# Patient Record
Sex: FEMALE | Race: WHITE | NOT HISPANIC OR LATINO | Employment: OTHER | URBAN - NONMETROPOLITAN AREA
[De-identification: names, ages, dates, MRNs, and addresses within clinical notes are randomized per-mention and may not be internally consistent; named-entity substitution may affect disease eponyms.]

---

## 2017-01-18 ENCOUNTER — HOSPITAL ENCOUNTER (OUTPATIENT)
Dept: GENERAL RADIOLOGY | Facility: HOSPITAL | Age: 57
Discharge: HOME | End: 2017-01-18
Attending: ORTHOPAEDIC SURGERY | Admitting: ORTHOPAEDIC SURGERY

## 2017-01-18 ENCOUNTER — OFFICE VISIT (OUTPATIENT)
Dept: ORTHOPEDIC SURGERY | Facility: CLINIC | Age: 57
End: 2017-01-18

## 2017-01-18 VITALS — HEIGHT: 67 IN | WEIGHT: 230 LBS | BODY MASS INDEX: 36.1 KG/M2

## 2017-01-18 DIAGNOSIS — M25.561 PAIN IN BOTH KNEES, UNSPECIFIED CHRONICITY: Primary | ICD-10-CM

## 2017-01-18 DIAGNOSIS — M25.562 PAIN IN BOTH KNEES, UNSPECIFIED CHRONICITY: Primary | ICD-10-CM

## 2017-01-18 PROCEDURE — 99213 OFFICE O/P EST LOW 20 MIN: CPT | Performed by: ORTHOPAEDIC SURGERY

## 2017-01-18 PROCEDURE — 20610 DRAIN/INJ JOINT/BURSA W/O US: CPT | Performed by: ORTHOPAEDIC SURGERY

## 2017-01-18 PROCEDURE — 73560 X-RAY EXAM OF KNEE 1 OR 2: CPT | Performed by: RADIOLOGY

## 2017-01-18 PROCEDURE — 73560 X-RAY EXAM OF KNEE 1 OR 2: CPT

## 2017-01-18 RX ORDER — LIDOCAINE HYDROCHLORIDE 20 MG/ML
5 INJECTION, SOLUTION INFILTRATION; PERINEURAL ONCE
Status: COMPLETED | OUTPATIENT
Start: 2017-01-18 | End: 2017-01-18

## 2017-01-18 RX ORDER — NORTRIPTYLINE HYDROCHLORIDE 25 MG/1
CAPSULE ORAL
Refills: 2 | COMMUNITY
Start: 2017-01-13 | End: 2017-02-01 | Stop reason: ALTCHOICE

## 2017-01-18 RX ORDER — BUTORPHANOL TARTRATE 10 MG/ML
1 SPRAY, METERED NASAL AS NEEDED
Refills: 2 | COMMUNITY
Start: 2017-01-13

## 2017-01-18 RX ORDER — BENZONATATE 200 MG/1
CAPSULE ORAL
Refills: 0 | COMMUNITY
Start: 2017-01-04 | End: 2017-02-09

## 2017-01-18 RX ORDER — METHYLPREDNISOLONE ACETATE 40 MG/ML
40 INJECTION, SUSPENSION INTRA-ARTICULAR; INTRALESIONAL; INTRAMUSCULAR; SOFT TISSUE ONCE
Status: COMPLETED | OUTPATIENT
Start: 2017-01-18 | End: 2017-01-18

## 2017-01-18 RX ORDER — VERAPAMIL HYDROCHLORIDE 180 MG/1
180 CAPSULE, EXTENDED RELEASE ORAL DAILY
Refills: 2 | COMMUNITY
Start: 2017-01-13

## 2017-01-18 RX ORDER — PROMETHAZINE HYDROCHLORIDE 25 MG/1
TABLET ORAL
Refills: 0 | COMMUNITY
Start: 2016-12-16 | End: 2017-02-09

## 2017-01-18 RX ORDER — DIHYDROERGOTAMINE MESYLATE 4 MG/ML
1 SPRAY NASAL AS NEEDED
Refills: 2 | COMMUNITY
Start: 2016-12-14 | End: 2022-08-31

## 2017-01-18 RX ADMIN — LIDOCAINE HYDROCHLORIDE 5 ML: 20 INJECTION, SOLUTION INFILTRATION; PERINEURAL at 10:47

## 2017-01-18 RX ADMIN — METHYLPREDNISOLONE ACETATE 40 MG: 40 INJECTION, SUSPENSION INTRA-ARTICULAR; INTRALESIONAL; INTRAMUSCULAR; SOFT TISSUE at 10:48

## 2017-01-18 NOTE — MR AVS SNAPSHOT
Roseline Steiner   1/18/2017 9:20 AM   Office Visit    Dept Phone:  [phone]   Encounter #:  [id number]    Provider:  Wilfred Randle MD   Department:  [facility]                Your Full Care Plan              Your Updated Medication List          This list is accurate as of: 1/18/17 10:32 AM.  Always use your most recent med list.                benzonatate 200 MG capsule   Commonly known as:  TESSALON       butorphanol 10 MG/ML nasal spray   Commonly known as:  STADOL       dihydroergotamine 4 MG/ML nasal spray   Commonly known as:  MIGRANAL       FLUoxetine 20 MG capsule   Commonly known as:  PROzac       gabapentin 300 MG capsule   Commonly known as:  NEURONTIN       levothyroxine 100 MCG tablet   Commonly known as:  SYNTHROID, LEVOTHROID       * nortriptyline 10 MG capsule   Commonly known as:  PAMELOR       * nortriptyline 25 MG capsule   Commonly known as:  PAMELOR       promethazine 25 MG tablet   Commonly known as:  PHENERGAN       SUMAtriptan 100 MG tablet   Commonly known as:  IMITREX       verapamil  MG 24 hr capsule   Commonly known as:  VERELAN PM       * Notice:  This list has 2 medication(s) that are the same as other medications prescribed for you. Read the directions carefully, and ask your doctor or other care provider to review them with you.            We Performed the Following     XR Knee 1 or 2 View Bilateral       You Were Diagnosed With        Codes Comments    Pain in both knees, unspecified chronicity    -  Primary ICD-10-CM: M25.561, M25.562  ICD-9-CM: 719.46       Instructions     None    Patient Instructions History      Upcoming Appointments     Visit Type Date Time Department    OFFICE VISIT 1/18/2017  9:20 AM [facility]    XR COR KNEE 1 OR 2 VW BILAT 1/18/2017  9:45 AM [facility]      MyChart Signup     [facility] allows you to send messages to your doctor, view your test results, renew  "your prescriptions, schedule appointments, and more. To sign up, go to [email or web address] and click on the Sign Up Now link in the New User? box. Enter your [employer] Activation Code exactly as it appears below along with the last four digits of your Social Security Number and your Date of Birth () to complete the sign-up process. If you do not sign up before the expiration date, you must request a new code.    [employer] Activation Code: [email or web address]  Expires: 2017 10:22 AM    If you have questions, you can email [email or web address] or call [phone] to talk to our [employer] staff. Remember, [employer] is NOT to be used for urgent needs. For medical emergencies, dial 911.               Other Info from Your Visit           Allergies     Codeine        Reason for Visit     Right Knee - Pain     Left Knee - Pain     Knee Pain patient presents today for bilateral knee pain, right worse than left      Vital Signs     Height Weight Body Mass Index Smoking Status          67\" (170.2 cm) 230 lb (104 kg) 36.02 kg/m2 Never Smoker        Problems and Diagnoses Noted     Bilateral knee pain    -  Primary      Results         "

## 2017-01-18 NOTE — PROGRESS NOTES
Roseline Steiner   :1960    Date of encounter:2017        HPI:  Roseline Steiner is a 56 y.o. female who presents here today with complaints of bilateral knee pain.  She is status post total knee arthroplasty of the left knee.  She states the left knee is doing reasonably well however she's had increased pain in the right knee.  She states that in October she fell and landed directly on the knee.  Since that fall her right knee has been giving out on her and causing her to fall.  She is complaining of significant right knee pain with instability.  She states it's worse with prolonged walking.  She denies paresthesias.    Exam:  Examination of the right knee reveals mild effusion.  She has mild medial joint line tenderness.  She is full range of motion.  No instability with varus or valgus stressing.  Neurovascular status is intact.    Radiographs:  AP and lateral views of the bilateral knees were reviewed.  The right knee does reveal osteoarthritic changes with slight worsening compared to x-rays obtained for years ago.  The left knee reveals total knee arthroplasty with prosthesis in good alignment and position, there is no evidence of loosening of the prosthesis.    Knee Aspiration/Injection Procedure Note    Right knee aspiration/injection was discussed with the patient in detail, including indication, risks, benefits, and alternatives. Verbal consent was given for the procedure.    Aspiration/injection site at the superior lateral capsule recess was identified by physical examination then cleaned with Betadine and alcohol swabs.  Sterile technique was used. Local anesthesia was obtained with approximately 5 mL of 2% lidocaine without epinephrine.      Approximately5 mL of clear yellow was aspirated.   The needle was left in place and syringe was changed for injection. Injectate was passed into the joint space without difficulty. The needle was removed and a sterile dressing was applied. The procedure was  tolerated well without difficulty.    Injection mixture:  2% lidocaine without epinephrine: 2 mL  40 mg/mL methylprednisolone acetate: 1 mL      Assessment    ICD-10-CM ICD-9-CM   1. Pain in both knees, unspecified chronicity M25.561 719.46    M25.562        Plan:  A 56-year-old female with complaints of right knee pain following a fall that occurred in October.  Radiographs do reveal moderate osteoarthritic changes with slight worsening compared to x-rays taken 3 years ago.  With her most recent fall she has likely aggravated her arthritis.  For diagnostic maneuver today we proceeded with a Depo-Medrol injection intra-articular into the knee.  She'll return back if no improvement or if there is any worsening of symptoms.    Written by, Camila CABELLO, acting as a scribe for Dr. Jer MCKEON, Wilfred Randle MD, personally performed the services described in this documentation as scribed by the above named individual in my presence, and it is both accurate and complete.  1/18/2017  11:38 AM    Cc Dr. Beavers

## 2017-02-01 ENCOUNTER — OFFICE VISIT (OUTPATIENT)
Dept: ORTHOPEDIC SURGERY | Facility: CLINIC | Age: 57
End: 2017-02-01

## 2017-02-01 VITALS
BODY MASS INDEX: 36.1 KG/M2 | SYSTOLIC BLOOD PRESSURE: 118 MMHG | HEART RATE: 98 BPM | HEIGHT: 67 IN | WEIGHT: 230 LBS | DIASTOLIC BLOOD PRESSURE: 73 MMHG

## 2017-02-01 DIAGNOSIS — M17.11 PRIMARY OSTEOARTHRITIS OF RIGHT KNEE: Primary | ICD-10-CM

## 2017-02-01 DIAGNOSIS — Z01.818 PREOP TESTING: ICD-10-CM

## 2017-02-01 PROCEDURE — 99214 OFFICE O/P EST MOD 30 MIN: CPT | Performed by: ORTHOPAEDIC SURGERY

## 2017-02-01 RX ORDER — SODIUM CHLORIDE 0.9 % (FLUSH) 0.9 %
1-10 SYRINGE (ML) INJECTION AS NEEDED
Status: CANCELLED | OUTPATIENT
Start: 2017-02-01

## 2017-02-01 RX ORDER — SODIUM CHLORIDE, SODIUM LACTATE, POTASSIUM CHLORIDE, CALCIUM CHLORIDE 600; 310; 30; 20 MG/100ML; MG/100ML; MG/100ML; MG/100ML
75 INJECTION, SOLUTION INTRAVENOUS CONTINUOUS
Status: CANCELLED | OUTPATIENT
Start: 2017-02-01

## 2017-02-01 RX ORDER — DICLOFENAC SODIUM 75 MG/1
75 TABLET, DELAYED RELEASE ORAL 2 TIMES DAILY
Refills: 1 | COMMUNITY
Start: 2017-01-20 | End: 2017-02-18 | Stop reason: HOSPADM

## 2017-02-01 RX ORDER — NORTRIPTYLINE HYDROCHLORIDE 50 MG/1
50 CAPSULE ORAL NIGHTLY
COMMUNITY
End: 2018-06-15 | Stop reason: ALTCHOICE

## 2017-02-01 RX ORDER — ACETAMINOPHEN, ASPIRIN AND CAFFEINE 250; 250; 65 MG/1; MG/1; MG/1
1 TABLET, FILM COATED ORAL EVERY 6 HOURS PRN
COMMUNITY
End: 2017-02-09

## 2017-02-01 NOTE — PROGRESS NOTES
History and Physical    Patient: Roseline Steiner  YOB: 1960  Date of encounter: 02/01/2017      History of Present Illness:   Roseline Steiner is a 56 y.o. female who presents here today with complaints of bilateral knee pain. She is status post total knee arthroplasty of the left knee. She states the left knee is doing reasonably well however she's had increased pain in the right knee. She states that in October she fell and landed directly on the knee. Since that fall her right knee has been giving out on her and causing her to fall. She is complaining of significant right knee pain with instability. She states it's worse with prolonged walking.  She's previously had Synvisc injections with no improvement.  She is most recently had a cortisone injection without any significant improvement in her knee pain.  She denies paresthesias.    PMH:   Patient Active Problem List   Diagnosis   • Migraine with status migrainosus   • Arthralgia of left hip   • Greater trochanteric bursitis   • Primary osteoarthritis of right knee     Past Medical History   Diagnosis Date   • Disease of thyroid gland    • Fibromyalgia    • Fibromyalgia    • GERD (gastroesophageal reflux disease)    • Insomnia    • Migraine    • Nephrolithiasis          PSH:  Past Surgical History   Procedure Laterality Date   • Hysterectomy     • Cholecystectomy     • Gastric banding     • Knee surgery     • Hand surgery     • Carpal tunnel release Bilateral    • Cranial neurostimulator insertion/replacement  01/09/2017     for migraines       Allergies:     Allergies   Allergen Reactions   • Codeine        Medications:     Current Outpatient Prescriptions:   •  aspirin-acetaminophen-caffeine (EXCEDRIN MIGRAINE) 250-250-65 MG per tablet, Take 1 tablet by mouth Every 6 (Six) Hours As Needed for headaches., Disp: , Rfl:   •  nortriptyline (PAMELOR) 50 MG capsule, Take 50 mg by mouth Every Night., Disp: , Rfl:   •  benzonatate (TESSALON) 200 MG capsule,  , Disp: , Rfl: 0  •  butorphanol (STADOL) 10 MG/ML nasal spray, , Disp: , Rfl: 2  •  diclofenac (VOLTAREN) 75 MG EC tablet, , Disp: , Rfl: 1  •  dihydroergotamine (MIGRANAL) 4 MG/ML nasal spray, , Disp: , Rfl: 2  •  FLUoxetine (PROzac) 20 MG capsule, Take 20 mg by mouth 3 (three) times a day., Disp: , Rfl:   •  gabapentin (NEURONTIN) 300 MG capsule, Take 300 mg by mouth 2 (two) times a day., Disp: , Rfl:   •  levothyroxine (SYNTHROID, LEVOTHROID) 100 MCG tablet, Take 100 mcg by mouth daily., Disp: , Rfl:   •  promethazine (PHENERGAN) 25 MG tablet, , Disp: , Rfl: 0  •  SUMAtriptan (IMITREX) 100 MG tablet, Take 100 mg by mouth every 2 (two) hours as needed for migraine., Disp: , Rfl:   •  verapamil PM (VERELAN PM) 180 MG 24 hr capsule, , Disp: , Rfl: 2    Social History:     Social History     Occupational History   • Not on file.     Social History Main Topics   • Smoking status: Never Smoker   • Smokeless tobacco: Not on file   • Alcohol use No   • Drug use: No   • Sexual activity: Not on file      Social History     Social History Narrative       Family History:     Family History   Problem Relation Age of Onset   • Cancer Father    • Hypertension Father    • Hyperlipidemia Mother        Review of Systems:   Pertinent positives are mentioned in the HPI. All other systems were reviewed and found to be negative.  Review of Systems   Constitutional: Negative.    HENT: Negative.    Eyes: Negative.    Respiratory: Negative.    Cardiovascular: Negative.    Gastrointestinal: Negative.    Endocrine: Negative.    Genitourinary: Negative.    Musculoskeletal:        Pertinent positives listed in HPI.     Skin: Negative.    Allergic/Immunologic: Negative.    Neurological: Negative.    Hematological: Negative.    Psychiatric/Behavioral: Negative.          Physical Exam:   General Appearance:    56 y.o. female  cooperative, in no acute distress.  Alert and oriented x 3,                   Vitals:    02/01/17 0943   BP: 118/73  "  BP Location: Right arm   Patient Position: Sitting   Pulse: 98   Weight: 230 lb (104 kg)   Height: 67\" (170.2 cm)          HEENT: Unremarkable      Neck: Supple without lymphadenopathy.      Chest: Clear to ascultation bilaterally. Normal respiratory effort.      Heart: Regular rate and rhythm. No murmurs noted.      Skin:  Warm and dry without any rash.      Musculoskeletal:  Upper Extremities: Unremarkable.   Lower Extremities: Examination of the right knee reveals mild effusion. She has mild medial joint line tenderness. She is full range of motion. No instability with varus or valgus stressing. Neurovascular status is intact.      Radiology:     AP and lateral views of the bilateral knees were reviewed. The right knee does reveal osteoarthritic changes with slight worsening compared to x-rays obtained for years ago. The left knee reveals total knee arthroplasty with prosthesis in good alignment and position, there is no evidence of loosening of the prosthesis    Assessment    ICD-10-CM ICD-9-CM   1. Primary osteoarthritis of right knee M17.11 715.16       Plan:  A 56-year-old female with primary osteoarthritis of the right knee.  She is no longer responding to conservative treatment including Synvisc injections, cortisone injections, and anti-inflammatories.  Her pain is getting progressively worse and now causing frequent falls.  Given this we have discussed proceeding with a total knee arthroplasty.  We discussed the risks, benefits, and future outcomes of surgery.  She accepts these risks and is agreeable to surgery.  She is a 28 been cleared through her primary care practitioner.  We will tentatively place her on the schedule for February 21, 2017 [facility].    Written by, Camila CABELLO, acting as a scribe for Dr. Jer Beavers I, Wilfred Randle MD, personally performed the services described in this documentation as scribed by the above named individual in my " presence, and it is both accurate and complete.  2/1/2017  7:28 PM    This document was signed by Wilfred Randle M.D.  February 1, 2017 7:28 PM

## 2017-02-02 NOTE — H&P
History and Physical     Patient: Roseline Steiner  YOB: 1960  Date of encounter: 02/01/2017        History of Present Illness:   Roseline Steiner is a 56 y.o. female who presents here today with complaints of bilateral knee pain. She is status post total knee arthroplasty of the left knee. She states the left knee is doing reasonably well however she's had increased pain in the right knee. She states that in October she fell and landed directly on the knee. Since that fall her right knee has been giving out on her and causing her to fall. She is complaining of significant right knee pain with instability. She states it's worse with prolonged walking.  She's previously had Synvisc injections with no improvement. She is most recently had a cortisone injection without any significant improvement in her knee pain. She denies paresthesias.     PMH:       Patient Active Problem List   Diagnosis   • Migraine with status migrainosus   • Arthralgia of left hip   • Greater trochanteric bursitis   • Primary osteoarthritis of right knee       Medical History         Past Medical History   Diagnosis Date   • Disease of thyroid gland     • Fibromyalgia     • Fibromyalgia     • GERD (gastroesophageal reflux disease)     • Insomnia     • Migraine     • Nephrolithiasis                 PSH:   Surgical History    Past Surgical History   Procedure Laterality Date   • Hysterectomy       • Cholecystectomy       • Gastric banding       • Knee surgery       • Hand surgery       • Carpal tunnel release Bilateral     • Cranial neurostimulator insertion/replacement   01/09/2017       for migraines            Allergies:           Allergies   Allergen Reactions   • Codeine           Medications:      Current Outpatient Prescriptions:   • aspirin-acetaminophen-caffeine (EXCEDRIN MIGRAINE) 250-250-65 MG per tablet, Take 1 tablet by mouth Every 6 (Six) Hours As Needed for headaches., Disp: , Rfl:   • nortriptyline (PAMELOR) 50 MG  capsule, Take 50 mg by mouth Every Night., Disp: , Rfl:   • benzonatate (TESSALON) 200 MG capsule, , Disp: , Rfl: 0  • butorphanol (STADOL) 10 MG/ML nasal spray, , Disp: , Rfl: 2  • diclofenac (VOLTAREN) 75 MG EC tablet, , Disp: , Rfl: 1  • dihydroergotamine (MIGRANAL) 4 MG/ML nasal spray, , Disp: , Rfl: 2  • FLUoxetine (PROzac) 20 MG capsule, Take 20 mg by mouth 3 (three) times a day., Disp: , Rfl:   • gabapentin (NEURONTIN) 300 MG capsule, Take 300 mg by mouth 2 (two) times a day., Disp: , Rfl:   • levothyroxine (SYNTHROID, LEVOTHROID) 100 MCG tablet, Take 100 mcg by mouth daily., Disp: , Rfl:   • promethazine (PHENERGAN) 25 MG tablet, , Disp: , Rfl: 0  • SUMAtriptan (IMITREX) 100 MG tablet, Take 100 mg by mouth every 2 (two) hours as needed for migraine., Disp: , Rfl:   • verapamil PM (VERELAN PM) 180 MG 24 hr capsule, , Disp: , Rfl: 2     Social History:      Social History          Occupational History   • Not on file.           Social History Main Topics   • Smoking status: Never Smoker   • Smokeless tobacco: Not on file   • Alcohol use No   • Drug use: No   • Sexual activity: Not on file      Social History      Social History Narrative         Family History:            Family History   Problem Relation Age of Onset   • Cancer Father     • Hypertension Father     • Hyperlipidemia Mother           Review of Systems:   Pertinent positives are mentioned in the HPI. All other systems were reviewed and found to be negative.  Review of Systems   Constitutional: Negative.   HENT: Negative.   Eyes: Negative.   Respiratory: Negative.   Cardiovascular: Negative.   Gastrointestinal: Negative.   Endocrine: Negative.   Genitourinary: Negative.   Musculoskeletal:   Pertinent positives listed in HPI.  Skin: Negative.   Allergic/Immunologic: Negative.   Neurological: Negative.   Hematological: Negative.   Psychiatric/Behavioral: Negative.            Physical Exam:   General Appearance:  56 y.o. female cooperative, in no  "acute distress. Alert and oriented x 3,        Vitals        Vitals:     02/01/17 0943   BP: 118/73   BP Location: Right arm   Patient Position: Sitting   Pulse: 98   Weight: 230 lb (104 kg)   Height: 67\" (170.2 cm)          HEENT: Unremarkable  Neck: Supple without lymphadenopathy.  Chest: Clear to ascultation bilaterally. Normal respiratory effort.  Heart: Regular rate and rhythm. No murmurs noted.  Skin:  Warm and dry without any rash.  Musculoskeletal:  Upper Extremities: Unremarkable.   Lower Extremities: Examination of the right knee reveals mild effusion. She has mild medial joint line tenderness. She is full range of motion. No instability with varus or valgus stressing. Neurovascular status is intact.        Radiology:    AP and lateral views of the bilateral knees were reviewed. The right knee does reveal osteoarthritic changes with slight worsening compared to x-rays obtained for years ago. The left knee reveals total knee arthroplasty with prosthesis in good alignment and position, there is no evidence of loosening of the prosthesis     Assessment      ICD-10-CM ICD-9-CM   1. Primary osteoarthritis of right knee M17.11 715.16         Plan:  A 56-year-old female with primary osteoarthritis of the right knee. She is no longer responding to conservative treatment including Synvisc injections, cortisone injections, and anti-inflammatories. Her pain is getting progressively worse and now causing frequent falls. Given this we have discussed proceeding with a total knee arthroplasty. We discussed the risks, benefits, and future outcomes of surgery. She accepts these risks and is agreeable to surgery. She is a 28 been cleared through her primary care practitioner. We will tentatively place her on the schedule for February 21, 2017 at [facility].     Written by, Camila CABELLO, acting as a scribe for Dr. Jer Beavers I, Wilfred Randle MD, personally performed the services " described in this documentation as scribed by the above named individual in my presence, and it is both accurate and complete. 2/1/2017 7:28 PM     This document was signed by Wilfred Randle M.D.  February 1, 2017 7:28 PM

## 2017-02-07 ENCOUNTER — TELEPHONE (OUTPATIENT)
Dept: ORTHOPEDIC SURGERY | Facility: CLINIC | Age: 57
End: 2017-02-07

## 2017-02-09 ENCOUNTER — APPOINTMENT (OUTPATIENT)
Dept: PREADMISSION TESTING | Facility: HOSPITAL | Age: 57
End: 2017-02-09

## 2017-02-09 LAB
ANION GAP SERPL CALCULATED.3IONS-SCNC: 3.9 MMOL/L (ref 3.6–11.2)
BASOPHILS # BLD AUTO: 0.04 10*3/MM3 (ref 0–0.3)
BASOPHILS NFR BLD AUTO: 0.7 % (ref 0–2)
BUN BLD-MCNC: 14 MG/DL (ref 7–21)
BUN/CREAT SERPL: 16.1 (ref 7–25)
CALCIUM SPEC-SCNC: 9.4 MG/DL (ref 7.7–10)
CHLORIDE SERPL-SCNC: 106 MMOL/L (ref 99–112)
CO2 SERPL-SCNC: 32.1 MMOL/L (ref 24.3–31.9)
CREAT BLD-MCNC: 0.87 MG/DL (ref 0.43–1.29)
DEPRECATED RDW RBC AUTO: 49 FL (ref 37–54)
EOSINOPHIL # BLD AUTO: 0.17 10*3/MM3 (ref 0–0.7)
EOSINOPHIL NFR BLD AUTO: 3 % (ref 0–5)
ERYTHROCYTE [DISTWIDTH] IN BLOOD BY AUTOMATED COUNT: 14.4 % (ref 11.5–14.5)
GFR SERPL CREATININE-BSD FRML MDRD: 67 ML/MIN/1.73
GLUCOSE BLD-MCNC: 81 MG/DL (ref 70–110)
HCT VFR BLD AUTO: 39.8 % (ref 37–47)
HGB BLD-MCNC: 12.2 G/DL (ref 12–16)
IMM GRANULOCYTES # BLD: 0.02 10*3/MM3 (ref 0–0.03)
IMM GRANULOCYTES NFR BLD: 0.4 % (ref 0–0.5)
LYMPHOCYTES # BLD AUTO: 1.98 10*3/MM3 (ref 1–3)
LYMPHOCYTES NFR BLD AUTO: 35 % (ref 21–51)
MCH RBC QN AUTO: 29.7 PG (ref 27–33)
MCHC RBC AUTO-ENTMCNC: 30.7 G/DL (ref 33–37)
MCV RBC AUTO: 96.8 FL (ref 80–94)
MONOCYTES # BLD AUTO: 0.47 10*3/MM3 (ref 0.1–0.9)
MONOCYTES NFR BLD AUTO: 8.3 % (ref 0–10)
MRSA DNA SPEC QL NAA+PROBE: NEGATIVE
NEUTROPHILS # BLD AUTO: 2.98 10*3/MM3 (ref 1.4–6.5)
NEUTROPHILS NFR BLD AUTO: 52.6 % (ref 30–70)
OSMOLALITY SERPL CALC.SUM OF ELEC: 282.6 MOSM/KG (ref 273–305)
PLATELET # BLD AUTO: 239 10*3/MM3 (ref 130–400)
PMV BLD AUTO: 9.9 FL (ref 6–10)
POTASSIUM BLD-SCNC: 3.8 MMOL/L (ref 3.5–5.3)
RBC # BLD AUTO: 4.11 10*6/MM3 (ref 4.2–5.4)
S AUREUS DNA SPEC QL NAA+PROBE: POSITIVE
SODIUM BLD-SCNC: 142 MMOL/L (ref 135–153)
WBC NRBC COR # BLD: 5.66 10*3/MM3 (ref 4.5–12.5)

## 2017-02-09 PROCEDURE — 36415 COLL VENOUS BLD VENIPUNCTURE: CPT

## 2017-02-09 PROCEDURE — 80048 BASIC METABOLIC PNL TOTAL CA: CPT | Performed by: ORTHOPAEDIC SURGERY

## 2017-02-09 PROCEDURE — 85025 COMPLETE CBC W/AUTO DIFF WBC: CPT | Performed by: ORTHOPAEDIC SURGERY

## 2017-02-09 PROCEDURE — 87640 STAPH A DNA AMP PROBE: CPT | Performed by: ORTHOPAEDIC SURGERY

## 2017-02-09 PROCEDURE — 87641 MR-STAPH DNA AMP PROBE: CPT | Performed by: ORTHOPAEDIC SURGERY

## 2017-02-09 NOTE — DISCHARGE INSTRUCTIONS
TAKE the following medications the morning of surgery:  All heart or blood pressure medications    HOLD all diabetic medications the morning of surgery as order by physician.    Arrival time for surgery on 2/14/2017 is 0600 am, please follow instructions on use of prep cloths given by nurse.  General Instructions:  • Do NOT eat or drink after midnight 2/13/2017 which includes water, mints, or gum.  • You may brush your teeth.  • Do NOT smoke, chew tobacco, or drink alcohol within 24 hours prior to surgery.  • Bring medications in original bottles, any inhalers and if applicable your C-PAP/BI-PAP machine  • Bring any papers given to you in the doctor’s office  • Wear clean, comfortable clothes and socks  • Do NOT wear contact lenses or make-up or dark nail polish.  Bring a case for your glasses if applicable.  • Bring crutches or walker if applicable  • Leave all other valuables and jewelry at home  • If you were given a blood bank armband, continue to wear it until discharged.    Preventing a Surgical Site Infection:  • Shower on the morning of surgery using a fresh bar of anti-bacterial soap (such as Dial) and clean washcloth.  Dry with a clean towel and dress in clean clothing.  • For 2 to 3 days before surgery, avoid shaving with a razor near where you will have surgery because the razor can irritate skin and make it easier to develop an infection.  Ask your surgeon if you will be receiving antibiotics prior to surgery.  • Make sure you, your family, and all healthcare providers clean their hands with soap and water or an alcohol-based hand  before caring for you or your wound.  • If at all possible, quit smoking as many days before surgery as you can.    Day of Surgery:  Upon arrival, a pre-op nurse and anesthesiologist will review your health history, obtain vital signs, and answer questions you may have.  The only belongings needed at this time will be your home medications and if applicable you  C-PAP/BI-PAP machine.  If you are staying overnight, your family can leave the rest of your belongings in the car and bring them to your room later.  A pre-op nurse will start an IV and you may receive medication in preparation for surgery.  Due to patient privacy and limited space, only one member of your family will be able to accompany you in the pre-op area.  While you are in surgery your family should notify the waiting room  if they leave the waiting room area and provide a contact number.  Please be aware that surgery does come with discomfort.  We want to make every effort to control your discomfort so please discuss any uncontrolled symptoms with your nurse.  Your doctor will most likely have prescribed pain medications.  If you are going home after surgery you will receive individualized written care instructions before being discharged.  A responsible adult must drive you to and from the hospital on the day of surgery and stay with you for 24 hours.  If you are staying overnight following surgery, you will be transported to your hospital room following the recovery period.

## 2017-02-09 NOTE — PAT
Unable to do 14 day type and screen due to protocol. Patient instructed to return on 2/13/2017 to out patient lab for required blood work. Spoke with Emely in blood bank for these instructions

## 2017-02-13 ENCOUNTER — APPOINTMENT (OUTPATIENT)
Dept: LAB | Facility: HOSPITAL | Age: 57
End: 2017-02-13
Attending: ORTHOPAEDIC SURGERY

## 2017-02-13 ENCOUNTER — TELEPHONE (OUTPATIENT)
Dept: ORTHOPEDIC SURGERY | Facility: CLINIC | Age: 57
End: 2017-02-13

## 2017-02-13 LAB
ABO GROUP BLD: NORMAL
BLD GP AB SCN SERPL QL: NEGATIVE
RH BLD: POSITIVE

## 2017-02-13 PROCEDURE — 86850 RBC ANTIBODY SCREEN: CPT

## 2017-02-13 PROCEDURE — 36415 COLL VENOUS BLD VENIPUNCTURE: CPT

## 2017-02-13 PROCEDURE — 86901 BLOOD TYPING SEROLOGIC RH(D): CPT

## 2017-02-13 PROCEDURE — 86900 BLOOD TYPING SEROLOGIC ABO: CPT

## 2017-02-14 ENCOUNTER — APPOINTMENT (OUTPATIENT)
Dept: GENERAL RADIOLOGY | Facility: HOSPITAL | Age: 57
End: 2017-02-14
Attending: ORTHOPAEDIC SURGERY

## 2017-02-14 ENCOUNTER — ANESTHESIA EVENT (OUTPATIENT)
Dept: PERIOP | Facility: HOSPITAL | Age: 57
End: 2017-02-14

## 2017-02-14 ENCOUNTER — HOSPITAL ENCOUNTER (INPATIENT)
Facility: HOSPITAL | Age: 57
LOS: 4 days | Discharge: HOME HEALTH | End: 2017-02-18
Attending: ORTHOPAEDIC SURGERY | Admitting: ORTHOPAEDIC SURGERY

## 2017-02-14 ENCOUNTER — ANESTHESIA (OUTPATIENT)
Dept: PERIOP | Facility: HOSPITAL | Age: 57
End: 2017-02-14

## 2017-02-14 DIAGNOSIS — Z96.651 STATUS POST TOTAL RIGHT KNEE REPLACEMENT: Primary | ICD-10-CM

## 2017-02-14 DIAGNOSIS — M17.11 PRIMARY OSTEOARTHRITIS OF RIGHT KNEE: ICD-10-CM

## 2017-02-14 DIAGNOSIS — Z01.818 PREOP TESTING: ICD-10-CM

## 2017-02-14 PROCEDURE — 97530 THERAPEUTIC ACTIVITIES: CPT

## 2017-02-14 PROCEDURE — 25010000003 CEFAZOLIN PER 500 MG: Performed by: ORTHOPAEDIC SURGERY

## 2017-02-14 PROCEDURE — C1776 JOINT DEVICE (IMPLANTABLE): HCPCS | Performed by: ORTHOPAEDIC SURGERY

## 2017-02-14 PROCEDURE — 25010000002 FENTANYL CITRATE (PF) 100 MCG/2ML SOLUTION: Performed by: ANESTHESIOLOGY

## 2017-02-14 PROCEDURE — L1830 KO IMMOB CANVAS LONG PRE OTS: HCPCS | Performed by: ORTHOPAEDIC SURGERY

## 2017-02-14 PROCEDURE — 94799 UNLISTED PULMONARY SVC/PX: CPT

## 2017-02-14 PROCEDURE — G8978 MOBILITY CURRENT STATUS: HCPCS

## 2017-02-14 PROCEDURE — 0SRC0J9 REPLACEMENT OF RIGHT KNEE JOINT WITH SYNTHETIC SUBSTITUTE, CEMENTED, OPEN APPROACH: ICD-10-PCS | Performed by: ORTHOPAEDIC SURGERY

## 2017-02-14 PROCEDURE — 25010000002 FENTANYL CITRATE (PF) 100 MCG/2ML SOLUTION: Performed by: NURSE ANESTHETIST, CERTIFIED REGISTERED

## 2017-02-14 PROCEDURE — 25010000002 MIDAZOLAM PER 1 MG: Performed by: ANESTHESIOLOGY

## 2017-02-14 PROCEDURE — 27447 TOTAL KNEE ARTHROPLASTY: CPT | Performed by: ORTHOPAEDIC SURGERY

## 2017-02-14 PROCEDURE — 25010000002 HYDROMORPHONE PER 4 MG: Performed by: ORTHOPAEDIC SURGERY

## 2017-02-14 PROCEDURE — 25010000002 PHENYLEPHRINE PER 1 ML: Performed by: NURSE ANESTHETIST, CERTIFIED REGISTERED

## 2017-02-14 PROCEDURE — 25010000002 HYDROMORPHONE PER 4 MG: Performed by: NURSE ANESTHETIST, CERTIFIED REGISTERED

## 2017-02-14 PROCEDURE — 25010000002 PROPOFOL 10 MG/ML EMULSION: Performed by: NURSE ANESTHETIST, CERTIFIED REGISTERED

## 2017-02-14 PROCEDURE — G8979 MOBILITY GOAL STATUS: HCPCS

## 2017-02-14 PROCEDURE — C1713 ANCHOR/SCREW BN/BN,TIS/BN: HCPCS | Performed by: ORTHOPAEDIC SURGERY

## 2017-02-14 PROCEDURE — 73560 X-RAY EXAM OF KNEE 1 OR 2: CPT

## 2017-02-14 PROCEDURE — 97116 GAIT TRAINING THERAPY: CPT

## 2017-02-14 PROCEDURE — 97162 PT EVAL MOD COMPLEX 30 MIN: CPT

## 2017-02-14 PROCEDURE — 25010000002 ONDANSETRON PER 1 MG: Performed by: NURSE ANESTHETIST, CERTIFIED REGISTERED

## 2017-02-14 PROCEDURE — 25010000002 DEXAMETHASONE PER 1 MG: Performed by: NURSE ANESTHETIST, CERTIFIED REGISTERED

## 2017-02-14 PROCEDURE — 73560 X-RAY EXAM OF KNEE 1 OR 2: CPT | Performed by: RADIOLOGY

## 2017-02-14 DEVICE — IMPLANTABLE DEVICE: Type: IMPLANTABLE DEVICE | Status: FUNCTIONAL

## 2017-02-14 DEVICE — ATTUNE KNEE SYSTEM TIBIAL INSERT ROTATING PLATFORM POSTERIOR STABILIZED SIZE 3 8MM AOX
Type: IMPLANTABLE DEVICE | Site: KNEE | Status: FUNCTIONAL
Brand: ATTUNE

## 2017-02-14 DEVICE — SMARTSET HIGH PERFORMANCE MV MEDIUM VISCOSITY BONE CEMENT 40G
Type: IMPLANTABLE DEVICE | Site: KNEE | Status: FUNCTIONAL
Brand: SMARTSET

## 2017-02-14 DEVICE — ATTUNE KNEE SYSTEM FEMORAL POSTERIOR STABILIZED SIZE 3 RIGHT CEMENTED
Type: IMPLANTABLE DEVICE | Site: KNEE | Status: FUNCTIONAL
Brand: ATTUNE

## 2017-02-14 DEVICE — ATTUNE KNEE SYSTEM TIBIAL BASE ROTATING PLATFORM SIZE 4 CEMENTED
Type: IMPLANTABLE DEVICE | Site: KNEE | Status: FUNCTIONAL
Brand: ATTUNE

## 2017-02-14 DEVICE — ATTUNE PATELLA MEDIALIZED DOME 35MM CEMENTED AOX
Type: IMPLANTABLE DEVICE | Site: PATELLA | Status: FUNCTIONAL
Brand: ATTUNE

## 2017-02-14 RX ORDER — FENTANYL CITRATE 50 UG/ML
50 INJECTION, SOLUTION INTRAMUSCULAR; INTRAVENOUS
Status: DISCONTINUED | OUTPATIENT
Start: 2017-02-14 | End: 2017-02-14 | Stop reason: HOSPADM

## 2017-02-14 RX ORDER — HYDROMORPHONE HYDROCHLORIDE 1 MG/ML
0.5 INJECTION, SOLUTION INTRAMUSCULAR; INTRAVENOUS; SUBCUTANEOUS
Status: DISCONTINUED | OUTPATIENT
Start: 2017-02-14 | End: 2017-02-14

## 2017-02-14 RX ORDER — MEPERIDINE HYDROCHLORIDE 25 MG/ML
12.5 INJECTION INTRAMUSCULAR; INTRAVENOUS; SUBCUTANEOUS
Status: DISCONTINUED | OUTPATIENT
Start: 2017-02-14 | End: 2017-02-14 | Stop reason: HOSPADM

## 2017-02-14 RX ORDER — SODIUM CHLORIDE 0.9 % (FLUSH) 0.9 %
1-10 SYRINGE (ML) INJECTION AS NEEDED
Status: DISCONTINUED | OUTPATIENT
Start: 2017-02-14 | End: 2017-02-14 | Stop reason: HOSPADM

## 2017-02-14 RX ORDER — NAPROXEN 250 MG/1
500 TABLET ORAL 2 TIMES DAILY WITH MEALS
Status: DISCONTINUED | OUTPATIENT
Start: 2017-02-14 | End: 2017-02-18 | Stop reason: HOSPADM

## 2017-02-14 RX ORDER — GABAPENTIN 300 MG/1
300 CAPSULE ORAL EVERY 12 HOURS SCHEDULED
Status: CANCELLED | OUTPATIENT
Start: 2017-02-14

## 2017-02-14 RX ORDER — NAPROXEN 250 MG/1
500 TABLET ORAL 2 TIMES DAILY WITH MEALS
Status: CANCELLED | OUTPATIENT
Start: 2017-02-14

## 2017-02-14 RX ORDER — ONDANSETRON 2 MG/ML
4 INJECTION INTRAMUSCULAR; INTRAVENOUS ONCE AS NEEDED
Status: DISCONTINUED | OUTPATIENT
Start: 2017-02-14 | End: 2017-02-14 | Stop reason: HOSPADM

## 2017-02-14 RX ORDER — FLUOXETINE HYDROCHLORIDE 20 MG/1
20 CAPSULE ORAL EVERY 8 HOURS SCHEDULED
Status: CANCELLED | OUTPATIENT
Start: 2017-02-14

## 2017-02-14 RX ORDER — BUTORPHANOL TARTRATE 10 MG/ML
1 SPRAY, METERED NASAL AS NEEDED
Status: CANCELLED | OUTPATIENT
Start: 2017-02-14

## 2017-02-14 RX ORDER — DOCUSATE SODIUM 100 MG/1
100 CAPSULE, LIQUID FILLED ORAL 2 TIMES DAILY PRN
Status: DISCONTINUED | OUTPATIENT
Start: 2017-02-14 | End: 2017-02-18 | Stop reason: HOSPADM

## 2017-02-14 RX ORDER — PROPOFOL 10 MG/ML
VIAL (ML) INTRAVENOUS AS NEEDED
Status: DISCONTINUED | OUTPATIENT
Start: 2017-02-14 | End: 2017-02-14 | Stop reason: SURG

## 2017-02-14 RX ORDER — OXYCODONE HYDROCHLORIDE AND ACETAMINOPHEN 5; 325 MG/1; MG/1
1 TABLET ORAL EVERY 4 HOURS PRN
Status: DISCONTINUED | OUTPATIENT
Start: 2017-02-14 | End: 2017-02-15

## 2017-02-14 RX ORDER — SODIUM CHLORIDE, SODIUM LACTATE, POTASSIUM CHLORIDE, CALCIUM CHLORIDE 600; 310; 30; 20 MG/100ML; MG/100ML; MG/100ML; MG/100ML
75 INJECTION, SOLUTION INTRAVENOUS CONTINUOUS
Status: DISCONTINUED | OUTPATIENT
Start: 2017-02-14 | End: 2017-02-16

## 2017-02-14 RX ORDER — NORTRIPTYLINE HYDROCHLORIDE 25 MG/1
50 CAPSULE ORAL NIGHTLY
Status: CANCELLED | OUTPATIENT
Start: 2017-02-14

## 2017-02-14 RX ORDER — MIDAZOLAM HYDROCHLORIDE 1 MG/ML
INJECTION INTRAMUSCULAR; INTRAVENOUS AS NEEDED
Status: DISCONTINUED | OUTPATIENT
Start: 2017-02-14 | End: 2017-02-14 | Stop reason: SURG

## 2017-02-14 RX ORDER — FLUOXETINE HYDROCHLORIDE 20 MG/1
20 CAPSULE ORAL EVERY 8 HOURS SCHEDULED
Status: DISCONTINUED | OUTPATIENT
Start: 2017-02-14 | End: 2017-02-18 | Stop reason: HOSPADM

## 2017-02-14 RX ORDER — NORTRIPTYLINE HYDROCHLORIDE 25 MG/1
50 CAPSULE ORAL NIGHTLY
Status: DISCONTINUED | OUTPATIENT
Start: 2017-02-14 | End: 2017-02-18 | Stop reason: HOSPADM

## 2017-02-14 RX ORDER — DIHYDROERGOTAMINE MESYLATE 4 MG/ML
1 SPRAY NASAL AS NEEDED
Status: DISCONTINUED | OUTPATIENT
Start: 2017-02-14 | End: 2017-02-18 | Stop reason: HOSPADM

## 2017-02-14 RX ORDER — DEXAMETHASONE SODIUM PHOSPHATE 4 MG/ML
INJECTION, SOLUTION INTRA-ARTICULAR; INTRALESIONAL; INTRAMUSCULAR; INTRAVENOUS; SOFT TISSUE AS NEEDED
Status: DISCONTINUED | OUTPATIENT
Start: 2017-02-14 | End: 2017-02-14 | Stop reason: SURG

## 2017-02-14 RX ORDER — NALOXONE HCL 0.4 MG/ML
0.1 VIAL (ML) INJECTION
Status: DISCONTINUED | OUTPATIENT
Start: 2017-02-14 | End: 2017-02-15

## 2017-02-14 RX ORDER — DIHYDROERGOTAMINE MESYLATE 4 MG/ML
1 SPRAY NASAL AS NEEDED
Status: CANCELLED | OUTPATIENT
Start: 2017-02-14

## 2017-02-14 RX ORDER — NALOXONE HCL 0.4 MG/ML
0.1 VIAL (ML) INJECTION
Status: DISCONTINUED | OUTPATIENT
Start: 2017-02-14 | End: 2017-02-14

## 2017-02-14 RX ORDER — SODIUM CHLORIDE, SODIUM LACTATE, POTASSIUM CHLORIDE, CALCIUM CHLORIDE 600; 310; 30; 20 MG/100ML; MG/100ML; MG/100ML; MG/100ML
100 INJECTION, SOLUTION INTRAVENOUS CONTINUOUS
Status: DISCONTINUED | OUTPATIENT
Start: 2017-02-14 | End: 2017-02-16

## 2017-02-14 RX ORDER — LEVOTHYROXINE SODIUM 0.1 MG/1
100 TABLET ORAL DAILY
Status: CANCELLED | OUTPATIENT
Start: 2017-02-15

## 2017-02-14 RX ORDER — LIDOCAINE HYDROCHLORIDE 20 MG/ML
INJECTION, SOLUTION INFILTRATION; PERINEURAL AS NEEDED
Status: DISCONTINUED | OUTPATIENT
Start: 2017-02-14 | End: 2017-02-14 | Stop reason: SURG

## 2017-02-14 RX ORDER — IPRATROPIUM BROMIDE AND ALBUTEROL SULFATE 2.5; .5 MG/3ML; MG/3ML
3 SOLUTION RESPIRATORY (INHALATION) ONCE AS NEEDED
Status: DISCONTINUED | OUTPATIENT
Start: 2017-02-14 | End: 2017-02-14 | Stop reason: HOSPADM

## 2017-02-14 RX ORDER — GABAPENTIN 300 MG/1
300 CAPSULE ORAL EVERY 12 HOURS SCHEDULED
Status: DISCONTINUED | OUTPATIENT
Start: 2017-02-14 | End: 2017-02-18 | Stop reason: HOSPADM

## 2017-02-14 RX ORDER — MAGNESIUM HYDROXIDE 1200 MG/15ML
LIQUID ORAL AS NEEDED
Status: DISCONTINUED | OUTPATIENT
Start: 2017-02-14 | End: 2017-02-14 | Stop reason: HOSPADM

## 2017-02-14 RX ORDER — HYDROMORPHONE HCL 110MG/55ML
PATIENT CONTROLLED ANALGESIA SYRINGE INTRAVENOUS AS NEEDED
Status: DISCONTINUED | OUTPATIENT
Start: 2017-02-14 | End: 2017-02-14 | Stop reason: SURG

## 2017-02-14 RX ORDER — LEVOTHYROXINE SODIUM 0.1 MG/1
100 TABLET ORAL
Status: DISCONTINUED | OUTPATIENT
Start: 2017-02-15 | End: 2017-02-18 | Stop reason: HOSPADM

## 2017-02-14 RX ORDER — ONDANSETRON 2 MG/ML
INJECTION INTRAMUSCULAR; INTRAVENOUS AS NEEDED
Status: DISCONTINUED | OUTPATIENT
Start: 2017-02-14 | End: 2017-02-14 | Stop reason: SURG

## 2017-02-14 RX ORDER — FENTANYL CITRATE 50 UG/ML
INJECTION, SOLUTION INTRAMUSCULAR; INTRAVENOUS AS NEEDED
Status: DISCONTINUED | OUTPATIENT
Start: 2017-02-14 | End: 2017-02-14 | Stop reason: SURG

## 2017-02-14 RX ORDER — ONDANSETRON 4 MG/1
4 TABLET, FILM COATED ORAL EVERY 6 HOURS PRN
Status: DISCONTINUED | OUTPATIENT
Start: 2017-02-14 | End: 2017-02-18 | Stop reason: HOSPADM

## 2017-02-14 RX ORDER — BUTORPHANOL TARTRATE 10 MG/ML
1 SPRAY, METERED NASAL AS NEEDED
Status: DISCONTINUED | OUTPATIENT
Start: 2017-02-14 | End: 2017-02-18 | Stop reason: HOSPADM

## 2017-02-14 RX ORDER — SODIUM CHLORIDE, SODIUM LACTATE, POTASSIUM CHLORIDE, CALCIUM CHLORIDE 600; 310; 30; 20 MG/100ML; MG/100ML; MG/100ML; MG/100ML
125 INJECTION, SOLUTION INTRAVENOUS CONTINUOUS
Status: DISCONTINUED | OUTPATIENT
Start: 2017-02-14 | End: 2017-02-16

## 2017-02-14 RX ADMIN — ONDANSETRON HYDROCHLORIDE 4 MG: 4 TABLET, FILM COATED ORAL at 20:38

## 2017-02-14 RX ADMIN — ONDANSETRON HYDROCHLORIDE 4 MG: 4 TABLET, FILM COATED ORAL at 14:09

## 2017-02-14 RX ADMIN — FENTANYL CITRATE 25 MCG: 50 INJECTION INTRAMUSCULAR; INTRAVENOUS at 09:10

## 2017-02-14 RX ADMIN — FENTANYL CITRATE 25 MCG: 50 INJECTION INTRAMUSCULAR; INTRAVENOUS at 08:20

## 2017-02-14 RX ADMIN — HYDROMORPHONE HYDROCHLORIDE 1 MG: 1 INJECTION, SOLUTION INTRAMUSCULAR; INTRAVENOUS; SUBCUTANEOUS at 23:01

## 2017-02-14 RX ADMIN — GABAPENTIN 300 MG: 300 CAPSULE ORAL at 20:38

## 2017-02-14 RX ADMIN — DEXAMETHASONE SODIUM PHOSPHATE 4 MG: 4 INJECTION, SOLUTION INTRAMUSCULAR; INTRAVENOUS at 09:50

## 2017-02-14 RX ADMIN — FLUOXETINE 20 MG: 20 CAPSULE ORAL at 14:09

## 2017-02-14 RX ADMIN — HYDROMORPHONE HYDROCHLORIDE 0.5 MG: 2 INJECTION, SOLUTION INTRAMUSCULAR; INTRAVENOUS; SUBCUTANEOUS at 09:40

## 2017-02-14 RX ADMIN — HYDROMORPHONE HYDROCHLORIDE 0.5 MG: 2 INJECTION, SOLUTION INTRAMUSCULAR; INTRAVENOUS; SUBCUTANEOUS at 09:30

## 2017-02-14 RX ADMIN — FENTANYL CITRATE 25 MCG: 50 INJECTION INTRAMUSCULAR; INTRAVENOUS at 08:30

## 2017-02-14 RX ADMIN — FENTANYL CITRATE 25 MCG: 50 INJECTION INTRAMUSCULAR; INTRAVENOUS at 08:55

## 2017-02-14 RX ADMIN — PHENYLEPHRINE HYDROCHLORIDE 100 MCG: 10 INJECTION, SOLUTION INTRAMUSCULAR; INTRAVENOUS; SUBCUTANEOUS at 09:40

## 2017-02-14 RX ADMIN — FENTANYL CITRATE 25 MCG: 50 INJECTION INTRAMUSCULAR; INTRAVENOUS at 08:00

## 2017-02-14 RX ADMIN — EPHEDRINE SULFATE 10 MG: 50 INJECTION INTRAMUSCULAR; INTRAVENOUS; SUBCUTANEOUS at 08:10

## 2017-02-14 RX ADMIN — LIDOCAINE HYDROCHLORIDE 100 MG: 20 INJECTION, SOLUTION INFILTRATION; PERINEURAL at 07:38

## 2017-02-14 RX ADMIN — OXYCODONE HYDROCHLORIDE AND ACETAMINOPHEN 1 TABLET: 5; 325 TABLET ORAL at 22:08

## 2017-02-14 RX ADMIN — HYDROMORPHONE HYDROCHLORIDE 0.5 MG: 2 INJECTION, SOLUTION INTRAMUSCULAR; INTRAVENOUS; SUBCUTANEOUS at 09:10

## 2017-02-14 RX ADMIN — SODIUM CHLORIDE, POTASSIUM CHLORIDE, SODIUM LACTATE AND CALCIUM CHLORIDE 125 ML/HR: 600; 310; 30; 20 INJECTION, SOLUTION INTRAVENOUS at 07:04

## 2017-02-14 RX ADMIN — SODIUM CHLORIDE, POTASSIUM CHLORIDE, SODIUM LACTATE AND CALCIUM CHLORIDE 100 ML/HR: 600; 310; 30; 20 INJECTION, SOLUTION INTRAVENOUS at 20:39

## 2017-02-14 RX ADMIN — FENTANYL CITRATE 25 MCG: 50 INJECTION INTRAMUSCULAR; INTRAVENOUS at 07:21

## 2017-02-14 RX ADMIN — PHENYLEPHRINE HYDROCHLORIDE 100 MCG: 10 INJECTION, SOLUTION INTRAMUSCULAR; INTRAVENOUS; SUBCUTANEOUS at 09:30

## 2017-02-14 RX ADMIN — MIDAZOLAM HYDROCHLORIDE 2 MG: 1 INJECTION, SOLUTION INTRAMUSCULAR; INTRAVENOUS at 07:38

## 2017-02-14 RX ADMIN — ONDANSETRON 4 MG: 2 INJECTION, SOLUTION INTRAMUSCULAR; INTRAVENOUS at 09:50

## 2017-02-14 RX ADMIN — SODIUM CHLORIDE, POTASSIUM CHLORIDE, SODIUM LACTATE AND CALCIUM CHLORIDE: 600; 310; 30; 20 INJECTION, SOLUTION INTRAVENOUS at 07:35

## 2017-02-14 RX ADMIN — CEFAZOLIN SODIUM 2 G: 2 SOLUTION INTRAVENOUS at 07:40

## 2017-02-14 RX ADMIN — SODIUM CHLORIDE, POTASSIUM CHLORIDE, SODIUM LACTATE AND CALCIUM CHLORIDE 125 ML/HR: 600; 310; 30; 20 INJECTION, SOLUTION INTRAVENOUS at 11:34

## 2017-02-14 RX ADMIN — FENTANYL CITRATE 50 MCG: 50 INJECTION, SOLUTION INTRAMUSCULAR; INTRAVENOUS at 10:35

## 2017-02-14 RX ADMIN — MIDAZOLAM HYDROCHLORIDE 2 MG: 1 INJECTION, SOLUTION INTRAMUSCULAR; INTRAVENOUS at 07:21

## 2017-02-14 RX ADMIN — HYDROMORPHONE HYDROCHLORIDE 1 MG: 1 INJECTION, SOLUTION INTRAMUSCULAR; INTRAVENOUS; SUBCUTANEOUS at 16:38

## 2017-02-14 RX ADMIN — PROPOFOL 200 MG: 10 INJECTION, EMULSION INTRAVENOUS at 07:38

## 2017-02-14 RX ADMIN — HYDROMORPHONE HYDROCHLORIDE 1 MG: 1 INJECTION, SOLUTION INTRAMUSCULAR; INTRAVENOUS; SUBCUTANEOUS at 14:09

## 2017-02-14 RX ADMIN — OXYCODONE HYDROCHLORIDE AND ACETAMINOPHEN 1 TABLET: 5; 325 TABLET ORAL at 12:47

## 2017-02-14 RX ADMIN — HYDROMORPHONE HYDROCHLORIDE 1 MG: 1 INJECTION, SOLUTION INTRAMUSCULAR; INTRAVENOUS; SUBCUTANEOUS at 20:38

## 2017-02-14 RX ADMIN — NORTRIPTYLINE HYDROCHLORIDE 50 MG: 25 CAPSULE ORAL at 20:38

## 2017-02-14 RX ADMIN — FLUOXETINE 20 MG: 20 CAPSULE ORAL at 22:08

## 2017-02-14 RX ADMIN — NAPROXEN 500 MG: 250 TABLET ORAL at 17:48

## 2017-02-14 RX ADMIN — OXYCODONE HYDROCHLORIDE AND ACETAMINOPHEN 1 TABLET: 5; 325 TABLET ORAL at 17:48

## 2017-02-14 RX ADMIN — FENTANYL CITRATE 25 MCG: 50 INJECTION INTRAMUSCULAR; INTRAVENOUS at 08:35

## 2017-02-14 RX ADMIN — FENTANYL CITRATE 50 MCG: 50 INJECTION, SOLUTION INTRAMUSCULAR; INTRAVENOUS at 10:24

## 2017-02-14 RX ADMIN — PHENYLEPHRINE HYDROCHLORIDE 100 MCG: 10 INJECTION, SOLUTION INTRAMUSCULAR; INTRAVENOUS; SUBCUTANEOUS at 08:14

## 2017-02-14 RX ADMIN — HYDROMORPHONE HYDROCHLORIDE 0.5 MG: 1 INJECTION, SOLUTION INTRAMUSCULAR; INTRAVENOUS; SUBCUTANEOUS at 11:28

## 2017-02-14 RX ADMIN — EPHEDRINE SULFATE 10 MG: 50 INJECTION INTRAMUSCULAR; INTRAVENOUS; SUBCUTANEOUS at 08:05

## 2017-02-14 RX ADMIN — SODIUM CHLORIDE, POTASSIUM CHLORIDE, SODIUM LACTATE AND CALCIUM CHLORIDE: 600; 310; 30; 20 INJECTION, SOLUTION INTRAVENOUS at 08:20

## 2017-02-14 RX ADMIN — CEFAZOLIN 2 G: 1 INJECTION, POWDER, FOR SOLUTION INTRAMUSCULAR; INTRAVENOUS; PARENTERAL at 21:31

## 2017-02-14 RX ADMIN — PHENYLEPHRINE HYDROCHLORIDE 100 MCG: 10 INJECTION, SOLUTION INTRAMUSCULAR; INTRAVENOUS; SUBCUTANEOUS at 09:50

## 2017-02-14 RX ADMIN — PHENYLEPHRINE HYDROCHLORIDE 100 MCG: 10 INJECTION, SOLUTION INTRAMUSCULAR; INTRAVENOUS; SUBCUTANEOUS at 08:28

## 2017-02-14 RX ADMIN — CEFAZOLIN 2 G: 1 INJECTION, POWDER, FOR SOLUTION INTRAMUSCULAR; INTRAVENOUS; PARENTERAL at 14:08

## 2017-02-14 RX ADMIN — FENTANYL CITRATE 25 MCG: 50 INJECTION INTRAMUSCULAR; INTRAVENOUS at 08:50

## 2017-02-14 RX ADMIN — HYDROMORPHONE HYDROCHLORIDE 0.5 MG: 2 INJECTION, SOLUTION INTRAMUSCULAR; INTRAVENOUS; SUBCUTANEOUS at 10:09

## 2017-02-14 NOTE — ANESTHESIA PREPROCEDURE EVALUATION
Anesthesia Evaluation     Patient summary reviewed and Nursing notes reviewed   no history of anesthetic complications:     Airway   Mallampati: I  TM distance: <3 FB  Neck ROM: full  no difficulty expected  Dental - normal exam     Pulmonary - negative pulmonary ROS and normal exam   Cardiovascular - negative cardio ROS and normal exam  Exercise tolerance: good (4-7 METS)    NYHA Classification: II        Neuro/Psych  (+) headaches,    GI/Hepatic/Renal/Endo    (+)  GERD, chronic renal disease,     Musculoskeletal     (+) myalgias,   Abdominal  - normal exam    Bowel sounds: normal.   Substance History - negative use     OB/GYN negative ob/gyn ROS         Other   (+) arthritis                                 Anesthesia Plan    ASA 3     general     intravenous induction   Anesthetic plan and risks discussed with patient.  Use of blood products discussed with patient .   Plan discussed with CRNA.

## 2017-02-14 NOTE — H&P (VIEW-ONLY)
History and Physical    Patient: Roseline Steiner  YOB: 1960  Date of encounter: 02/01/2017      History of Present Illness:   Roseline Steiner is a 56 y.o. female who presents here today with complaints of bilateral knee pain. She is status post total knee arthroplasty of the left knee. She states the left knee is doing reasonably well however she's had increased pain in the right knee. She states that in October she fell and landed directly on the knee. Since that fall her right knee has been giving out on her and causing her to fall. She is complaining of significant right knee pain with instability. She states it's worse with prolonged walking.  She's previously had Synvisc injections with no improvement.  She is most recently had a cortisone injection without any significant improvement in her knee pain.  She denies paresthesias.    PMH:   Patient Active Problem List   Diagnosis   • Migraine with status migrainosus   • Arthralgia of left hip   • Greater trochanteric bursitis   • Primary osteoarthritis of right knee     Past Medical History   Diagnosis Date   • Disease of thyroid gland    • Fibromyalgia    • Fibromyalgia    • GERD (gastroesophageal reflux disease)    • Insomnia    • Migraine    • Nephrolithiasis          PSH:  Past Surgical History   Procedure Laterality Date   • Hysterectomy     • Cholecystectomy     • Gastric banding     • Knee surgery     • Hand surgery     • Carpal tunnel release Bilateral    • Cranial neurostimulator insertion/replacement  01/09/2017     for migraines       Allergies:     Allergies   Allergen Reactions   • Codeine        Medications:     Current Outpatient Prescriptions:   •  aspirin-acetaminophen-caffeine (EXCEDRIN MIGRAINE) 250-250-65 MG per tablet, Take 1 tablet by mouth Every 6 (Six) Hours As Needed for headaches., Disp: , Rfl:   •  nortriptyline (PAMELOR) 50 MG capsule, Take 50 mg by mouth Every Night., Disp: , Rfl:   •  benzonatate (TESSALON) 200 MG capsule,  , Disp: , Rfl: 0  •  butorphanol (STADOL) 10 MG/ML nasal spray, , Disp: , Rfl: 2  •  diclofenac (VOLTAREN) 75 MG EC tablet, , Disp: , Rfl: 1  •  dihydroergotamine (MIGRANAL) 4 MG/ML nasal spray, , Disp: , Rfl: 2  •  FLUoxetine (PROzac) 20 MG capsule, Take 20 mg by mouth 3 (three) times a day., Disp: , Rfl:   •  gabapentin (NEURONTIN) 300 MG capsule, Take 300 mg by mouth 2 (two) times a day., Disp: , Rfl:   •  levothyroxine (SYNTHROID, LEVOTHROID) 100 MCG tablet, Take 100 mcg by mouth daily., Disp: , Rfl:   •  promethazine (PHENERGAN) 25 MG tablet, , Disp: , Rfl: 0  •  SUMAtriptan (IMITREX) 100 MG tablet, Take 100 mg by mouth every 2 (two) hours as needed for migraine., Disp: , Rfl:   •  verapamil PM (VERELAN PM) 180 MG 24 hr capsule, , Disp: , Rfl: 2    Social History:     Social History     Occupational History   • Not on file.     Social History Main Topics   • Smoking status: Never Smoker   • Smokeless tobacco: Not on file   • Alcohol use No   • Drug use: No   • Sexual activity: Not on file      Social History     Social History Narrative       Family History:     Family History   Problem Relation Age of Onset   • Cancer Father    • Hypertension Father    • Hyperlipidemia Mother        Review of Systems:   Pertinent positives are mentioned in the HPI. All other systems were reviewed and found to be negative.  Review of Systems   Constitutional: Negative.    HENT: Negative.    Eyes: Negative.    Respiratory: Negative.    Cardiovascular: Negative.    Gastrointestinal: Negative.    Endocrine: Negative.    Genitourinary: Negative.    Musculoskeletal:        Pertinent positives listed in HPI.     Skin: Negative.    Allergic/Immunologic: Negative.    Neurological: Negative.    Hematological: Negative.    Psychiatric/Behavioral: Negative.          Physical Exam:   General Appearance:    56 y.o. female  cooperative, in no acute distress.  Alert and oriented x 3,                   Vitals:    02/01/17 0943   BP: 118/73  "  BP Location: Right arm   Patient Position: Sitting   Pulse: 98   Weight: 230 lb (104 kg)   Height: 67\" (170.2 cm)          HEENT: Unremarkable      Neck: Supple without lymphadenopathy.      Chest: Clear to ascultation bilaterally. Normal respiratory effort.      Heart: Regular rate and rhythm. No murmurs noted.      Skin:  Warm and dry without any rash.      Musculoskeletal:  Upper Extremities: Unremarkable.   Lower Extremities: Examination of the right knee reveals mild effusion. She has mild medial joint line tenderness. She is full range of motion. No instability with varus or valgus stressing. Neurovascular status is intact.      Radiology:     AP and lateral views of the bilateral knees were reviewed. The right knee does reveal osteoarthritic changes with slight worsening compared to x-rays obtained for years ago. The left knee reveals total knee arthroplasty with prosthesis in good alignment and position, there is no evidence of loosening of the prosthesis    Assessment    ICD-10-CM ICD-9-CM   1. Primary osteoarthritis of right knee M17.11 715.16       Plan:  A 56-year-old female with primary osteoarthritis of the right knee.  She is no longer responding to conservative treatment including Synvisc injections, cortisone injections, and anti-inflammatories.  Her pain is getting progressively worse and now causing frequent falls.  Given this we have discussed proceeding with a total knee arthroplasty.  We discussed the risks, benefits, and future outcomes of surgery.  She accepts these risks and is agreeable to surgery.  She is a 28 been cleared through her primary care practitioner.  We will tentatively place her on the schedule for February 21, 2017 at [facility].    Written by, Camila CABELLO, acting as a scribe for Dr. Jer Beavers I, Wilfred Randle MD, personally performed the services described in this documentation as scribed by the above named individual in my " presence, and it is both accurate and complete.  2/1/2017  7:28 PM    This document was signed by Wilfred Randle M.D.  February 1, 2017 7:28 PM

## 2017-02-14 NOTE — OP NOTE
DATE OF SURGERY: 02/14/2017    PREOPERATIVE DIAGNOSIS: Osteoarthritis, right knee.     POSTOPERATIVE DIAGNOSIS: Osteoarthritis, right knee.     PROCEDURE: Right total knee arthroplasty, cemented.     ANESTHESIA: General plus femoral nerve block.     ASSISTANT:  Anibal Matthews CSA    SURGEON:  Wilfred Randle MD    ANTIBIOTICS: Then 2 g of Ancef.      BLOOD LOSS: 200 mL.      IMPLANT COMPONENTS: Attune, attune posterior stabilized size 3, rotating platform tibial insert posterior stabilized size 3 x 8.  Attune patella medialized 35 mm, and attune tibial base rotating platform size 4.     DESCRIPTION OF PROCEDURE:  With the patient in the operating theater, general anesthetic administered, and with a femoral nerve block administered in the preoperative area, the right leg was placed in a tourniquet. The extremity was sterilely prepped and draped in the usual manner. Tourniquet left deflated. A midline incision was made. A medial parapatellar arthrotomy was created.  Patella everted and the knee flexed, osteophytes were removed from the femur tibia and patella. The distal femur was cannulated and with the intramedullary guide positioned, the distal femoral cut was made at 9 mm.  Anterior, posterior, and chamfer cuts created. Intercondylar notch osteotomized and a femoral component trial positioned and found to be appropriate. The lugs drilled it was removed. External tibial alignment device then positioned for the tibia with both menisci excised and the anterior cruciate ligament excised. The proximal tibial osteotomy was created 6 mm off the low side. With the trial tibial tray positioned, keel and broach were created. A variety of inserts were positioned after repositioning of the femoral component until the appropriate thickness was determined. A slight medial release with Reagn elevator was completed. Posterior aspect of the patella was osteomized drilled for a 3 peg patellar button. The extremity was  exsanguinated. The joint was copiously irrigated. Cement was prepared. All 3 components were precoated and then cemented into place. Once cement had hardened, excess cement was removed. Again variety of tibial inserts positioned until appropriate thickness was determined. The tourniquet was deflated. Hemostasis was obtained. The tibial insert positioned and the knee again reduced taken through motion and confirmed to have good medial lateral stability, full extension and good posterior rollback. The wound was copiously irrigated, closed in layers over a Hemovac drain, staples for final closure. A sterile dressing applied with a knee immobilizer. She was lightened from anesthetic and taken to the recovery room in stable condition.       D: MILDRED 02/14/2017 10:43:41 ET  T: johnson / 02/14/2017 11:05:41 ET  Revision Count: 0  Job ID: [id number]  [id number]  cc:        Dictator Signature:___________________________     Wilfred Randle M.D.

## 2017-02-14 NOTE — PROGRESS NOTES
Acute Care - Physical Therapy Initial Evaluation  [facility]     Patient Name: Roseline Steiner  : 1960  MRN: [id number]  Today's Date: 2017   Onset of Illness/Injury or Date of Surgery Date: 17  Date of Referral to PT: 17  Referring Physician: Dr. Farah      Admit Date: 2017     Visit Dx:    ICD-10-CM ICD-9-CM   1. Primary osteoarthritis of right knee M17.11 715.16   2. Preop testing Z01.818 V72.84     Patient Active Problem List   Diagnosis   • Migraine with status migrainosus   • Arthralgia of left hip   • Greater trochanteric bursitis   • Primary osteoarthritis of right knee     Past Medical History   Diagnosis Date   • Arthritis    • Disease of thyroid gland    • Fibromyalgia    • Fibromyalgia    • GERD (gastroesophageal reflux disease)    • Insomnia    • Migraine    • Nephrolithiasis    • PONV (postoperative nausea and vomiting)    • Spinal headache      Past Surgical History   Procedure Laterality Date   • Hysterectomy     • Cholecystectomy     • Gastric banding     • Knee surgery     • Hand surgery     • Carpal tunnel release Bilateral    • Cranial neurostimulator insertion/replacement  2017     for migraines   • Abdominal surgery     • Eye surgery Bilateral      Cataract   • Joint replacement  2012   • Joint replacement Right 2017     right total knee  [facility]  dr farah          PT ASSESSMENT (last 72 hours)      PT Evaluation       17 1807 17 1355    Rehab Evaluation    Document Type evaluation  -BC     Subjective Information agree to therapy;complains of;weakness   pressure  -BC     General Information    Patient Profile Review yes  -BC     Onset of Illness/Injury or Date of Surgery Date 17  -BC     Referring Physician Dr. Farah  -BC     General Observations Pt. supine in bed, awake and alert. Has rincon,O2 per nc, KI in place  -BC     Equipment Currently Used at Home walker, rolling;commode  -BC     Plans/Goals Discussed With patient and  family;agreed upon  -BC     Risks Reviewed patient and family:;LOB;nausea/vomiting;dizziness;increased discomfort;change in vital signs;increased drainage;lines disloged  -BC     Benefits Reviewed patient and family:;improve function;increase independence;increase strength;increase balance;decrease pain  -BC     Living Environment    Lives With spouse  -BC     Living Arrangements house  -BC     Clinical Impression    Date of Referral to PT 02/14/17  -BC     PT Diagnosis right TKR  -BC     Functional Level At Time Of Evaluation mod a x 2  -BC     Patient/Family Goals Statement walk  -BC     Criteria for Skilled Therapeutic Interventions Met yes;treatment indicated  -BC     Rehab Potential good, to achieve stated therapy goals  -BC     Predicted Duration of Therapy Intervention (days/wks) 2-3 days  -BC     Pain Assessment    Pain Assessment Hernandez-Baker FACES  -BC     Hernandez-Baker FACES Pain Rating 6  -BC     Pain Location Knee  -BC     Pain Orientation Right  -BC     Cognitive Assessment/Intervention    Current Cognitive/Communication Assessment impaired  -BC     Orientation Status oriented to;person;place;situation  -BC     Follows Commands/Answers Questions able to follow single-step instructions;75% of the time  -BC     Personal Safety mild impairment;decreased awareness, need for assist;decreased awareness, need for safety;decreased insight to deficits  -BC     Personal Safety Interventions gait belt;nonskid shoes/slippers when out of bed;fall prevention program maintained;muscle strengthening facilitated  -BC     ROM (Range of Motion)    General ROM lower extremity range of motion deficits identified  -BC     General ROM Detail R LE limited by 50 %  -BC     MMT (Manual Muscle Testing)    General MMT Assessment lower extremity strength deficits identified  -BC     General MMT Assessment Detail RLE not tested  -BC     Muscle Tone Assessment    Muscle Tone Assessment  Bilateral Upper Extremities;Bilateral Lower  Extremities  -EA    Bilateral Upper Extremities Muscle Tone Assessment  mildly decreased tone  -EA    Bilateral Lower Extremities Muscle Tone Assessment  mildly decreased tone  -EA    Mobility Assessment/Training    Extremity Weight-Bearing Status right lower extremity  -BC     Right Lower Extremity Weight-Bearing weight-bearing as tolerated  -BC     Bed Mobility, Assessment/Treatment    Bed Mobility, Assistive Device bed rails  -BC     Bed Mob, Supine to Sit, [place] moderate assist (50% patient effort);2 person assist required  -BC     Bed Mob, Sit to Supine, [place] moderate assist (50% patient effort);2 person assist required  -BC     Transfer Assessment/Treatment    Transfers, Sit-Stand [place] moderate assist (50% patient effort);2 person assist required  -BC     Transfers, Stand-Sit [place] moderate assist (50% patient effort);2 person assist required  -BC     Transfers, Sit-Stand-Sit, Assist Device rolling walker  -BC     Gait Assessment/Treatment    Gait, [place] Level moderate assist (50% patient effort);2 person assist required  -BC     Gait, Assistive Device rolling walker  -BC     Gait, Distance (Feet) 3  -BC     Gait, Safety Issues weight-shifting ability decreased  -BC     Positioning and Restraints    Pre-Treatment Position in bed  -BC     Post Treatment Position bed  -BC     In Bed notified nsg;supine;sitting;encouraged to call for assist;call light within reach;with family/caregiver;side rails up x3  -BC       02/14/17 1239 02/14/17 1113    General Information    Equipment Currently Used at Home commode;walker, rolling  -RD none  -CB    Living Environment    Lives With spouse   Pt lives with her spouse, Gregg.  -RD     Transportation Available car;family or friend will provide  -RD car  -CB      02/14/17 1108 02/14/17 1106    General Information    Equipment Currently Used at Home  none  -CB    Living Environment    Lives With spouse  -CB     Living Arrangements house  -      Home Accessibility stairs within home  -CB     Number of Stairs Within Home 15  -CB     Stair Railings at Home inside, present on right side  -CB     Type of Financial/Environmental Concern none  -CB     Transportation Available car  -CB       02/14/17 0700       General Information    Equipment Currently Used at Home none  -LM     Living Environment    Transportation Available car  -LM       User Key  (r) = Recorded By, (t) = Taken By, (c) = Cosigned By    Initials Name Provider Type    CB Tammie Delgado, RN Registered Nurse    MARLEN Gamez, RN Registered Nurse    BC Mackenzie Foster, PT Physical Therapist    LM Adam Coyle RN Registered Nurse    LUIS FELIPE Davenport           Physical Therapy Education     Title: PT OT SLP Therapies (Active)     Topic: Physical Therapy (Active)     Point: Mobility training (Active)    Learning Progress Summary    Learner Readiness Method Response Comment Documented by Status   Patient Acceptance E NR  BC 02/14/17 1816 Active               Point: Home exercise program (Active)    Learning Progress Summary    Learner Readiness Method Response Comment Documented by Status   Patient Acceptance E NR  BC 02/14/17 1816 Active               Point: Body mechanics (Active)    Learning Progress Summary    Learner Readiness Method Response Comment Documented by Status   Patient Acceptance E NR  BC 02/14/17 1816 Active               Point: Precautions (Active)    Learning Progress Summary    Learner Readiness Method Response Comment Documented by Status   Patient Acceptance E NR  BC 02/14/17 1816 Active                      User Key     Initials Effective Dates Name Provider Type Discipline    BC 03/14/16 -  Mackenzie Foster, PT Physical Therapist PT                PT Recommendation and Plan  Planned Therapy Interventions: balance training, bed mobility training, gait training, home exercise program, patient/family education, strengthening, transfer  training  PT Frequency: 2 times/day, 5 times/wk, per priority policy             IP PT Goals       02/14/17 1816          Bed Mobility PT LTG    Bed Mobility PT LTG, Date Established 02/14/17  -BC      Bed Mobility PT LTG, Time to Achieve 2 - 3 days  -BC      Bed Mobility PT LTG, Activity Type all bed mobility  -BC      Bed Mobility PT LTG, [place] Level contact guard assist  -BC      Bed Mobility PT Goal  LTG, Assist Device bed rails  -BC      Transfer Training PT LTG    Transfer Training PT LTG, Time to Achieve 2 - 3 days  -BC      Transfer Training PT LTG, Activity Type all transfers  -BC      Transfer Training PT LTG, [place] Level contact guard assist  -BC      Transfer Training PT LTG, Assist Device walker, rolling  -BC      Gait Training PT LTG    Gait Training Goal PT LTG, Date Established 02/14/17  -BC      Gait Training Goal PT LTG, Time to Achieve 2 - 3 days  -BC      Gait Training Goal PT LTG, [place] Level contact guard assist  -BC      Gait Training Goal PT LTG, Assist Device walker, rolling  -BC      Gait Training Goal PT LTG, Distance to Achieve 80  -BC        User Key  (r) = Recorded By, (t) = Taken By, (c) = Cosigned By    Initials Name Provider Type    BC Mackenzie Foster, PT Physical Therapist                Outcome Measures       02/14/17 1800          How much help from another person do you currently need...    Turning from your back to your side while in flat bed without using bedrails? 2  -BC      Moving from lying on back to sitting on the side of a flat bed without bedrails? 2  -BC      Moving to and from a bed to a chair (including a wheelchair)? 2  -BC      Standing up from a chair using your arms (e.g., wheelchair, bedside chair)? 2  -BC      Climbing 3-5 steps with a railing? 1  -BC      To walk in hospital room? 2  -BC      AM-PAC 6 Clicks Score 11  -BC      Functional Assessment    Outcome Measure Options AM-PAC 6 Clicks Basic Mobility (PT)  -BC        User Key  (r) =  Recorded By, (t) = Taken By, (c) = Cosigned By    Initials Name Provider Type    BC Mackenzie Foster, PT Physical Therapist           Time Calculation:         PT Charges       02/14/17 1821          Time Calculation    Start Time --   60  -BC      PT Received On 02/14/17  -BC      PT Goal Re-Cert Due Date 02/28/17  -BC        User Key  (r) = Recorded By, (t) = Taken By, (c) = Cosigned By    Initials Name Provider Type    BC Mackenzie Foster PT Physical Therapist          Therapy Charges for Today     Code Description Service Date Service Provider Modifiers Qty    21328584941 HC PT MOBILITY CURRENT 2/14/2017 Mackenzie Foster, PT GP, CL 1    01681501167 HC PT MOBILITY PROJECTED 2/14/2017 Mackenzie Foster, PT GP, CK 1    54842421933 HC GAIT TRAINING EA 15 MIN 2/14/2017 Mackenzie Foster, PT GP 1    19794926198 HC PT EVAL MOD COMPLEXITY 2 2/14/2017 Mackenzie Foster, PT GP 1    20519523192 HC PT THERAPEUTIC ACT EA 15 MIN 2/14/2017 Mackenzie Foster, PT GP 1    18967257064 HC PT THER SUPP EA 15 MIN 2/14/2017 Mackenzie Foster, PT GP 4          PT G-Codes  Outcome Measure Options: AM-PAC 6 Clicks Basic Mobility (PT)  Score: 11  Functional Limitation: Mobility: Walking and moving around  Mobility: Walking and Moving Around Current Status (): At least 60 percent but less than 80 percent impaired, limited or restricted  Mobility: Walking and Moving Around Goal Status (): At least 40 percent but less than 60 percent impaired, limited or restricted      Mackenzie Foster PT  2/14/2017

## 2017-02-14 NOTE — ANESTHESIA PROCEDURE NOTES
Airway  Urgency: elective    Airway not difficult    General Information and Staff    Patient location during procedure: OR  CRNA: JERI DUVAL    Indications and Patient Condition    Preoxygenated: yes  MILS maintained throughout  Mask difficulty assessment: 1 - vent by mask    Final Airway Details  Final airway type: supraglottic airway      Successful airway: unique  Size 4    Number of attempts at approach: 1    Additional Comments  LMA inserted without difficulty.  Head and neck maintained midline.  Teeth and lips as per preop.

## 2017-02-14 NOTE — PLAN OF CARE
Problem: Patient Care Overview (Adult)  Goal: Plan of Care Review  Outcome: Ongoing (interventions implemented as appropriate)  Goal: Adult Individualization and Mutuality  Outcome: Ongoing (interventions implemented as appropriate)  Goal: Discharge Needs Assessment  Outcome: Ongoing (interventions implemented as appropriate)    Problem: Knee Replacement, Total (Adult)  Goal: Signs and Symptoms of Listed Potential Problems Will be Absent or Manageable (Knee Replacement, Total)  Outcome: Ongoing (interventions implemented as appropriate)    Problem: Fall Risk (Adult)  Goal: Identify Related Risk Factors and Signs and Symptoms  Outcome: Ongoing (interventions implemented as appropriate)  Goal: Absence of Falls  Outcome: Ongoing (interventions implemented as appropriate)    Problem: Pain, Acute (Adult)  Goal: Identify Related Risk Factors and Signs and Symptoms  Outcome: Ongoing (interventions implemented as appropriate)  Goal: Acceptable Pain Control/Comfort Level  Outcome: Ongoing (interventions implemented as appropriate)

## 2017-02-14 NOTE — PLAN OF CARE
Problem: Perioperative Period (Adult)  Goal: Signs and Symptoms of Listed Potential Problems Will be Absent or Manageable (Perioperative Period)  Outcome: Ongoing (interventions implemented as appropriate)    02/14/17 0659   Perioperative Period   Problems Assessed (Perioperative Period) pain   Problems Present (Perioperative Period) pain

## 2017-02-14 NOTE — H&P (VIEW-ONLY)
History and Physical     Patient: Roseline Steiner  YOB: 1960  Date of encounter: 02/01/2017        History of Present Illness:   Roseline Steiner is a 56 y.o. female who presents here today with complaints of bilateral knee pain. She is status post total knee arthroplasty of the left knee. She states the left knee is doing reasonably well however she's had increased pain in the right knee. She states that in October she fell and landed directly on the knee. Since that fall her right knee has been giving out on her and causing her to fall. She is complaining of significant right knee pain with instability. She states it's worse with prolonged walking.  She's previously had Synvisc injections with no improvement. She is most recently had a cortisone injection without any significant improvement in her knee pain. She denies paresthesias.     PMH:       Patient Active Problem List   Diagnosis   • Migraine with status migrainosus   • Arthralgia of left hip   • Greater trochanteric bursitis   • Primary osteoarthritis of right knee       Medical History         Past Medical History   Diagnosis Date   • Disease of thyroid gland     • Fibromyalgia     • Fibromyalgia     • GERD (gastroesophageal reflux disease)     • Insomnia     • Migraine     • Nephrolithiasis                 PSH:   Surgical History    Past Surgical History   Procedure Laterality Date   • Hysterectomy       • Cholecystectomy       • Gastric banding       • Knee surgery       • Hand surgery       • Carpal tunnel release Bilateral     • Cranial neurostimulator insertion/replacement   01/09/2017       for migraines            Allergies:           Allergies   Allergen Reactions   • Codeine           Medications:      Current Outpatient Prescriptions:   • aspirin-acetaminophen-caffeine (EXCEDRIN MIGRAINE) 250-250-65 MG per tablet, Take 1 tablet by mouth Every 6 (Six) Hours As Needed for headaches., Disp: , Rfl:   • nortriptyline (PAMELOR) 50 MG  capsule, Take 50 mg by mouth Every Night., Disp: , Rfl:   • benzonatate (TESSALON) 200 MG capsule, , Disp: , Rfl: 0  • butorphanol (STADOL) 10 MG/ML nasal spray, , Disp: , Rfl: 2  • diclofenac (VOLTAREN) 75 MG EC tablet, , Disp: , Rfl: 1  • dihydroergotamine (MIGRANAL) 4 MG/ML nasal spray, , Disp: , Rfl: 2  • FLUoxetine (PROzac) 20 MG capsule, Take 20 mg by mouth 3 (three) times a day., Disp: , Rfl:   • gabapentin (NEURONTIN) 300 MG capsule, Take 300 mg by mouth 2 (two) times a day., Disp: , Rfl:   • levothyroxine (SYNTHROID, LEVOTHROID) 100 MCG tablet, Take 100 mcg by mouth daily., Disp: , Rfl:   • promethazine (PHENERGAN) 25 MG tablet, , Disp: , Rfl: 0  • SUMAtriptan (IMITREX) 100 MG tablet, Take 100 mg by mouth every 2 (two) hours as needed for migraine., Disp: , Rfl:   • verapamil PM (VERELAN PM) 180 MG 24 hr capsule, , Disp: , Rfl: 2     Social History:      Social History          Occupational History   • Not on file.           Social History Main Topics   • Smoking status: Never Smoker   • Smokeless tobacco: Not on file   • Alcohol use No   • Drug use: No   • Sexual activity: Not on file      Social History      Social History Narrative         Family History:            Family History   Problem Relation Age of Onset   • Cancer Father     • Hypertension Father     • Hyperlipidemia Mother           Review of Systems:   Pertinent positives are mentioned in the HPI. All other systems were reviewed and found to be negative.  Review of Systems   Constitutional: Negative.   HENT: Negative.   Eyes: Negative.   Respiratory: Negative.   Cardiovascular: Negative.   Gastrointestinal: Negative.   Endocrine: Negative.   Genitourinary: Negative.   Musculoskeletal:   Pertinent positives listed in HPI.  Skin: Negative.   Allergic/Immunologic: Negative.   Neurological: Negative.   Hematological: Negative.   Psychiatric/Behavioral: Negative.            Physical Exam:   General Appearance:  56 y.o. female cooperative, in no  "acute distress. Alert and oriented x 3,        Vitals        Vitals:     02/01/17 0943   BP: 118/73   BP Location: Right arm   Patient Position: Sitting   Pulse: 98   Weight: 230 lb (104 kg)   Height: 67\" (170.2 cm)          HEENT: Unremarkable  Neck: Supple without lymphadenopathy.  Chest: Clear to ascultation bilaterally. Normal respiratory effort.  Heart: Regular rate and rhythm. No murmurs noted.  Skin:  Warm and dry without any rash.  Musculoskeletal:  Upper Extremities: Unremarkable.   Lower Extremities: Examination of the right knee reveals mild effusion. She has mild medial joint line tenderness. She is full range of motion. No instability with varus or valgus stressing. Neurovascular status is intact.        Radiology:    AP and lateral views of the bilateral knees were reviewed. The right knee does reveal osteoarthritic changes with slight worsening compared to x-rays obtained for years ago. The left knee reveals total knee arthroplasty with prosthesis in good alignment and position, there is no evidence of loosening of the prosthesis     Assessment      ICD-10-CM ICD-9-CM   1. Primary osteoarthritis of right knee M17.11 715.16         Plan:  A 56-year-old female with primary osteoarthritis of the right knee. She is no longer responding to conservative treatment including Synvisc injections, cortisone injections, and anti-inflammatories. Her pain is getting progressively worse and now causing frequent falls. Given this we have discussed proceeding with a total knee arthroplasty. We discussed the risks, benefits, and future outcomes of surgery. She accepts these risks and is agreeable to surgery. She is a 28 been cleared through her primary care practitioner. We will tentatively place her on the schedule for February 21, 2017 at [facility].     Written by, Camila CABELLO, acting as a scribe for Dr. Jer Beavers I, Wilfred Randle MD, personally performed the services " described in this documentation as scribed by the above named individual in my presence, and it is both accurate and complete. 2/1/2017 7:28 PM     This document was signed by Wilfred Randle M.D.  February 1, 2017 7:28 PM

## 2017-02-14 NOTE — BRIEF OP NOTE
TOTAL KNEE ARTHROPLASTY  Procedure Note    Roseline Steiner  2/14/2017    Pre-op Diagnosis:   Preop testing [Z01.818]  Primary osteoarthritis of right knee [M17.11]    Post-op Diagnosis:     Post-Op Diagnosis Codes:     * Preop testing [Z01.818]     * Primary osteoarthritis of right knee [M17.11]    Procedure/CPT® Codes:      Procedure(s):  TOTAL KNEE ARTHROPLASTY right    Surgeon(s):  Wilfred Randle MD    Anesthesia: general/fnb    Staff:   Circulator: Marcelino Waller RN  Scrub Person: Rebecca Fuller  Vendor Representative: Casper Causey  Assistant: Anibal Matthews CSA  Other: Dominick Sexton    Estimated Blood Loss: 200 mL    Specimens:                * No specimens in log *      Drains:   Y Collapsible Closed Device 1 and 2 02/14/17 0935 (Active)       Urethral Catheter 02/14/17 0743 100% silicone 16 10 10 (Active)           Findings:     Complications:       Wilfred Randle MD     Date: 2/14/2017  Time: 9:56 AM

## 2017-02-14 NOTE — PLAN OF CARE
Problem: Patient Care Overview (Adult)  Goal: Discharge Needs Assessment  Outcome: Ongoing (interventions implemented as appropriate)    02/14/17 0700   Discharge Needs Assessment   Concerns To Be Addressed no discharge needs identified   Readmission Within The Last 30 Days no previous admission in last 30 days   Equipment Needed After Discharge walker, standard   Discharge Disposition home or self-care   Current Health   Anticipated Changes Related to Illness none   Self-Care   Equipment Currently Used at Home none   Living Environment   Transportation Available car

## 2017-02-14 NOTE — PLAN OF CARE
Problem: Patient Care Overview (Adult)  Goal: Plan of Care Review  Outcome: Ongoing (interventions implemented as appropriate)    02/14/17 0701   Coping/Psychosocial Response Interventions   Plan Of Care Reviewed With patient   Patient Care Overview   Progress progress toward functional goals as expected

## 2017-02-14 NOTE — ANESTHESIA PROCEDURE NOTES
Peripheral Block    Patient location during procedure: pre-op  Start time: 2/14/2017 7:08 AM  Stop time: 2/14/2017 7:22 AM  Reason for block: at surgeon's request and post-op pain management  Performed by  Anesthesiologist: MARTHA KU  Preanesthetic Checklist  Completed: patient identified, site marked, surgical consent, pre-op evaluation, timeout performed, IV checked, risks and benefits discussed and monitors and equipment checked  Peripheral Block Prep:  Sterile barriers:gloves and mask  Prep: ChloraPrep and alcohol swabs  Patient monitoring: blood pressure monitoring, continuous pulse oximetry and EKG  Peripheral Procedure  Sedation:yes  Guidance:nerve stimulator and landmark technique  Images:still images not obtained  Block Type:femoral  Injection Technique:single-shotNeedle Type:short-bevel  Needle Gauge:21 G    Medications  Local Injected:ropivacaine 0.5% without epinephrine Local Amount Injected:40mL  Post Assessment  Patient Tolerance:comfortable throughout block  Complications:no

## 2017-02-14 NOTE — PROGRESS NOTES
Discharge Planning Assessment   Deep     Patient Name: Roseline Steiner  MRN: [id number]  Today's Date: 2/14/2017    Admit Date: 2/14/2017    Discharge Needs Assessment       02/14/17 1239    Living Environment    Lives With spouse   Pt lives with her spouse, Gregg.    Quality Of Family Relationships involved;helpful;supportive    Able to Return to Prior Living Arrangements yes    Discharge Needs Assessment    Outpatient/Agency/Support Group Needs --   PCP is Dr. Beavers. Pt utilizes [facility] for prescriptions.     Community Agency Name(S) --   Pt does not currently utilize home health services.     Equipment Currently Used at Home commode;walker, rolling    Equipment Needed After Discharge none    Transportation Available car;family or friend will provide    Discharge Disposition home or self-care         Discharge Plan       02/14/17 1240    Case Management/Social Work Plan    Plan Pt lives at home with her spouse, Gregg and plans to return home at discharge. Pt does not currently utilize home health services. Pt states he has a rolling walker and a bed side commode at home and has no needs at discharge. SS will follow and assist as needed with discharge planning.    Patient/Family In Agreement With Plan yes     Demographic Summary       02/14/17 1239    Referral Information    Admission Type inpatient    Referral Source nursing    Reason For Consult discharge planning      Legal       02/14/17 1239    Legal    Legal Comments Pt does not have a POA or advance directive.     Marisela Davenport

## 2017-02-14 NOTE — CONSULTS
Chief complaint arthritis of the right knee    Subjective     Patient is a 56 y.o. female presents with as an admit to [facility] secondary to right knee end-stage osteoarthritis requiring total knee arthroplasty.  The patient had a left total knee arthroplasty about 5 years ago.  She's done very well with that.  She has progressively worsening right knee pain and weakness with multiple falls with her knee giving out beneath her with impaired mobility.  She had tried corticosteroid-induced injection without any success.  She states that the pain continued to get progressively worse so ultimately she decided have a totally arthroplasty.  She was having a little bit of swelling in the knee.  She is now postop day #0 status post total knee arthroplasty.  It's been rather painful but states overall she thinks is going to do well.  She denies any shortness of breath, cough, chest pains or palpitations.  She has been using oral medications.  She states that overall her pain is been pretty well managed postoperatively.  She has a long history of migraine headaches and had a neurostimulator placed last year.  It is significantly decreased the frequency and intensity of her migraine headaches.  She uses Maxalt when necessary for migraines.  She continues on verapamil and on when necessary Maxalt and gabapentin for migraine headache prevention.  No longer taking topiramate secondary to memory loss caused by that medication.  She does occasionally use Stadol.    Review of Systems   On review of systems the patient denies the following unless noted above:     Constitutional:  Fevers, chills, weight change, fatigue     Eyes: Vision changes, headache, double vision, loss of vision     ENT: Runny nose, nose bleeds, ringing in ears, pain with swallowing, sore throat     Cardiovascular: Chest pains, palpitations, PND, orthopnea     Respiratory: Cough, wheezing, SOA, hemoptysis     GI:  Abdominal pain, diarrhea,  constipation, change in stool caliber,    Rectal bleeding, vomiting or nausea     : Difficulty voiding, dysuria, hematuria     Musculoskeletal: Changes of any chronic joint pain, swelling     Skin: Rash, itching, easy bruisability     Neurological: Unilateral weakness, new onset numbness, speech difficulties     Psychiatric: Sadness, tearfulness, feelings of hopelessness, racing thoughts     Endocrine:  Heat or cold intolerance, mood swings, polydipsia, polyphagia,    recent hypoglycemia      History  Past Medical History   Diagnosis Date   • Arthritis    • Disease of thyroid gland    • Fibromyalgia    • Fibromyalgia    • GERD (gastroesophageal reflux disease)    • Insomnia    • Migraine    • Nephrolithiasis    • PONV (postoperative nausea and vomiting)    • Spinal headache      Past Surgical History   Procedure Laterality Date   • Hysterectomy     • Cholecystectomy     • Gastric banding     • Knee surgery     • Hand surgery     • Carpal tunnel release Bilateral    • Cranial neurostimulator insertion/replacement  01/09/2017     for migraines   • Abdominal surgery     • Eye surgery Bilateral      Cataract   • Joint replacement  2012   • Joint replacement Right 02/14/2017     right total knee  [facility]  dr farah     Family History   Problem Relation Age of Onset   • Cancer Father    • Hypertension Father    • Hyperlipidemia Mother      Social History   Substance Use Topics   • Smoking status: Never Smoker   • Smokeless tobacco: Never Used   • Alcohol use No     Prescriptions Prior to Admission   Medication Sig Dispense Refill Last Dose   • butorphanol (STADOL) 10 MG/ML nasal spray 1 spray into each nostril As Needed for mild pain (1-3) or moderate pain (4-6) (migraine, take if migranol doesn't work). May repeat 1 spray in 60 min if first spray doesn't achieve good migraine control.  2 2/13/2017 at 2200   • dihydroergotamine (MIGRANAL) 4 MG/ML nasal spray 1 spray into each nostril As Needed for migraine.  2 2/13/2017  "at 2200   • FLUoxetine (PROzac) 20 MG capsule Take 20 mg by mouth 3 (three) times a day.   2/13/2017 at 2200   • gabapentin (NEURONTIN) 300 MG capsule Take 300 mg by mouth 2 (two) times a day.   2/14/2017 at 0500   • levothyroxine (SYNTHROID, LEVOTHROID) 100 MCG tablet Take 100 mcg by mouth daily.   2/14/2017 at 0500   • nortriptyline (PAMELOR) 50 MG capsule Take 50 mg by mouth Every Night.   2/13/2017 at 2200   • verapamil PM (VERELAN PM) 180 MG 24 hr capsule Take 180 mg by mouth Daily.  2 2/14/2017 at 0500   • diclofenac (VOLTAREN) 75 MG EC tablet Take 75 mg by mouth 2 (Two) Times a Day. Was told to hold for upcoming knee surgery  1 01/31/2017 at unknown     Allergies:  Codeine    Scheduled Meds:  [START ON 2/15/2017] apixaban 2.5 mg Oral Q12H   ceFAZolin 2 g Intravenous Q8H   FLUoxetine 20 mg Oral Q8H   gabapentin 300 mg Oral Q12H   [START ON 2/15/2017] levothyroxine 100 mcg Oral Q AM   naproxen 500 mg Oral BID With Meals   nortriptyline 50 mg Oral Nightly   [START ON 2/15/2017] verapamil  mg Oral Q24H     Continuous Infusions:  lactated ringers 75 mL/hr Last Rate: Stopped (02/14/17 0950)   lactated ringers 125 mL/hr Last Rate: 125 mL/hr (02/14/17 1134)   lactated ringers 100 mL/hr      PRN Meds:.butorphanol  •  dihydroergotamine  •  docusate sodium  •  HYDROmorphone **AND** naloxone  •  ondansetron  •  oxyCODONE-acetaminophen          Objective     Vital Signs    Visit Vitals   • /69 (BP Location: Right arm, Patient Position: Lying)   • Pulse 106   • Temp 97.8 °F (36.6 °C) (Oral)   • Resp 18   • Ht 67\" (170.2 cm)   • SpO2 96%   • Breastfeeding No           Physical Exam:   General Appearance: alert, pleasant, appears stated age, interactive and   cooperative   Head: normocephalic, without obvious abnormality and atraumatic   Eyes: lids and lashes normal, conjunctivae and sclerae normal, no icterus, no   pallor, corneas clear and PERRLA   Ears: ears appear intact with no abnormalities noted   Nose: " nares normal, septum midline, mucosa normal and no drainage   Throat: no oral lesions, no thrush, oral mucosa moist and oopharynx normal   Neck: no adenopathy, supple, trachea midline, no thyromegaly, no carotid bruit   and no JVD   Back: no kyphosis present, no scoliosis present, no skin lesions, erythema, or   scars, no tenderness to percussion or palpation and range of motion normal   Lungs: clear to auscultation, respirations regular, respirations even and    respirations unlabored. No accessory muscle use.    Heart:: regular rhythm & normal rate, normal S1, S2, no murmur, no gallop, no   rub and no click.  Chest wall with no abnormalities observed. PMI nondisplaced   Abdomen: normal bowel sounds in all quadrants, no masses, no hepatomegaly,   no splenomegaly, soft non-tender, no guarding and no rebound tenderness   Extremities: no edema, no cyanosis, no redness, no tenderness, no clubbing   Musculoskeletal: Left knee with normal range of motion.  No instability.  Right knee is immobilized.  There is a drain in place.     Neurologic: Mental Status orientated to person, place, time and situation.    Speech is intelligible, Nonlabored.  Alertness alert and awake and mood/affect   normal, Cranial Nerves 2 - 12 grossly intact as examined   Sensory intact in BLE and BUE.   Motor strength  LUE is 5/5 proximally, 5/5 distally      RUE is 5/5 proximally, 5/5 distally      LLE is 5/5 @ hip flexors, quads as well as       dorsiflexion / plantar flexion      RLE is hip flexors and quadriceps cannot be assessed      dosriflexion / plantar flexion 5/5   Reflexes: Right:  2+ biceps, 2+ brachioradialis      2+ patella, 2+ achilles     Left: 2+ biceps, 2+ brachioradialis      2+ patella, 2+ achilles    Results Review:   Lab Results (last 24 hours)     ** No results found for the last 24 hours. **        Imaging Results (last 24 hours)     Procedure Component Value Units Date/Time    XR Knee 1 or 2 View Right [[id number]]  Collected:  02/14/17 1050     Updated:  02/14/17 1052    Narrative:       EXAMINATION: XR KNEE 1 OR 2 VW RIGHT-      XR KNEE 1 OR 2 VW RIGHT-      INDICATION: POST SURG subsequent     COMPARISON: 1/18/2017     FINDINGS: Total right knee arthroplasty. Alignment maintained. Soft  tissue postsurgical change.          Impression:       As above.              This report was finalized on 2/14/2017 10:50 AM by Dr. Chris Ring MD.             Assessment/Plan     Active Problems:    Primary osteoarthritis of right knee    Migraine headaches without aura without status migrainosus       Continue postoperative care per orthopedic surgery.  Physical therapy pending.  Hydromorphone when necessary for pain.      Eliquis for DVT prophylaxis     Continue gabapentin, nortriptyline and gabapentin for migraine prophylaxis.  When necessary Rasheldol        Samuel Duane Kreis, MD  02/14/17  4:45 PM

## 2017-02-14 NOTE — PLAN OF CARE
Problem: Inpatient Physical Therapy  Goal: Bed Mobility Goal LTG- PT  Outcome: Ongoing (interventions implemented as appropriate)    02/14/17 1816   Bed Mobility PT LTG   Bed Mobility PT LTG, Date Established 02/14/17   Bed Mobility PT LTG, Time to Achieve 2 - 3 days   Bed Mobility PT LTG, Activity Type all bed mobility   Bed Mobility PT LTG, Parker Level contact guard assist   Bed Mobility PT Goal LTG, Assist Device bed rails       Goal: Transfer Training Goal 1 LTG- PT    02/14/17 1816   Transfer Training PT LTG   Transfer Training PT LTG, Time to Achieve 2 - 3 days   Transfer Training PT LTG, Activity Type all transfers   Transfer Training PT LTG, Parker Level contact guard assist   Transfer Training PT LTG, Assist Device walker, rolling       Goal: Gait Training Goal LTG- PT  Outcome: Ongoing (interventions implemented as appropriate)    02/14/17 1816   Gait Training PT LTG   Gait Training Goal PT LTG, Date Established 02/14/17   Gait Training Goal PT LTG, Time to Achieve 2 - 3 days   Gait Training Goal PT LTG, Parker Level contact guard assist   Gait Training Goal PT LTG, Assist Device walker, rolling   Gait Training Goal PT LTG, Distance to Achieve 80

## 2017-02-14 NOTE — ANESTHESIA POSTPROCEDURE EVALUATION
Patient: Roseline Steiner    Procedure Summary     Date Anesthesia Start Anesthesia Stop Room / Location    02/14/17 0735 1009  [facility] / [facility]       Procedure Diagnosis Surgeon Provider    TOTAL KNEE ARTHROPLASTY (Right Knee) Preop testing; Primary osteoarthritis of right knee  (Preop testing [Z01.818]; Primary osteoarthritis of right knee [M17.11]) MD Brant Mcknight MD          Anesthesia Type: general  Last vitals  /65 (02/14/17 1049)    Temp 97.4 °F (36.3 °C) (02/14/17 1049)    Pulse 82 (02/14/17 1049)   Resp 16 (02/14/17 1049)    SpO2 96 % (02/14/17 1049)      Post Anesthesia Care and Evaluation    Patient location during evaluation: PHASE II  Patient participation: complete - patient participated  Level of consciousness: awake and alert  Pain score: 1  Pain management: adequate  Airway patency: patent  Anesthetic complications: No anesthetic complications  PONV Status: controlled  Cardiovascular status: acceptable  Respiratory status: acceptable  Hydration status: acceptable

## 2017-02-15 LAB
DEPRECATED RDW RBC AUTO: 48.7 FL (ref 37–54)
ERYTHROCYTE [DISTWIDTH] IN BLOOD BY AUTOMATED COUNT: 14.1 % (ref 11.5–14.5)
HCT VFR BLD AUTO: 25.9 % (ref 37–47)
HCT VFR BLD AUTO: 26.3 % (ref 37–47)
HGB BLD-MCNC: 7.9 G/DL (ref 12–16)
HGB BLD-MCNC: 8.3 G/DL (ref 12–16)
MCH RBC QN AUTO: 30 PG (ref 27–33)
MCHC RBC AUTO-ENTMCNC: 30 G/DL (ref 33–37)
MCV RBC AUTO: 100 FL (ref 80–94)
PLATELET # BLD AUTO: 205 10*3/MM3 (ref 130–400)
PMV BLD AUTO: 10.1 FL (ref 6–10)
RBC # BLD AUTO: 2.63 10*6/MM3 (ref 4.2–5.4)
RETICS #: 0.07 10*6/MM3 (ref 0.02–0.13)
RETICS/RBC NFR AUTO: 2.76 % (ref 0.5–2)
WBC NRBC COR # BLD: 8.31 10*3/MM3 (ref 4.5–12.5)

## 2017-02-15 PROCEDURE — 94799 UNLISTED PULMONARY SVC/PX: CPT

## 2017-02-15 PROCEDURE — 25010000002 HYDROMORPHONE PER 4 MG: Performed by: ORTHOPAEDIC SURGERY

## 2017-02-15 PROCEDURE — 99024 POSTOP FOLLOW-UP VISIT: CPT | Performed by: PHYSICIAN ASSISTANT

## 2017-02-15 PROCEDURE — 25010000002 KETOROLAC TROMETHAMINE PER 15 MG: Performed by: ORTHOPAEDIC SURGERY

## 2017-02-15 PROCEDURE — 97116 GAIT TRAINING THERAPY: CPT

## 2017-02-15 PROCEDURE — 97530 THERAPEUTIC ACTIVITIES: CPT

## 2017-02-15 PROCEDURE — 25010000002 KETOROLAC TROMETHAMINE PER 15 MG: Performed by: FAMILY MEDICINE

## 2017-02-15 PROCEDURE — 85027 COMPLETE CBC AUTOMATED: CPT | Performed by: ORTHOPAEDIC SURGERY

## 2017-02-15 PROCEDURE — 85045 AUTOMATED RETICULOCYTE COUNT: CPT | Performed by: PHYSICIAN ASSISTANT

## 2017-02-15 PROCEDURE — 85014 HEMATOCRIT: CPT | Performed by: PHYSICIAN ASSISTANT

## 2017-02-15 PROCEDURE — 25010000002 MORPHINE PER 10 MG: Performed by: ORTHOPAEDIC SURGERY

## 2017-02-15 PROCEDURE — 85018 HEMOGLOBIN: CPT | Performed by: PHYSICIAN ASSISTANT

## 2017-02-15 RX ORDER — OXYCODONE AND ACETAMINOPHEN 10; 325 MG/1; MG/1
1 TABLET ORAL EVERY 4 HOURS PRN
Status: DISCONTINUED | OUTPATIENT
Start: 2017-02-15 | End: 2017-02-18 | Stop reason: HOSPADM

## 2017-02-15 RX ORDER — MORPHINE SULFATE 10 MG/ML
8 INJECTION INTRAMUSCULAR; INTRAVENOUS; SUBCUTANEOUS
Status: DISCONTINUED | OUTPATIENT
Start: 2017-02-15 | End: 2017-02-18 | Stop reason: HOSPADM

## 2017-02-15 RX ORDER — KETOROLAC TROMETHAMINE 30 MG/ML
30 INJECTION, SOLUTION INTRAMUSCULAR; INTRAVENOUS EVERY 6 HOURS PRN
Status: DISCONTINUED | OUTPATIENT
Start: 2017-02-15 | End: 2017-02-18 | Stop reason: HOSPADM

## 2017-02-15 RX ORDER — KETOROLAC TROMETHAMINE 30 MG/ML
15 INJECTION, SOLUTION INTRAMUSCULAR; INTRAVENOUS ONCE
Status: COMPLETED | OUTPATIENT
Start: 2017-02-15 | End: 2017-02-15

## 2017-02-15 RX ADMIN — APIXABAN 2.5 MG: 2.5 TABLET, FILM COATED ORAL at 22:01

## 2017-02-15 RX ADMIN — KETOROLAC TROMETHAMINE 15 MG: 30 INJECTION, SOLUTION INTRAMUSCULAR; INTRAVENOUS at 08:29

## 2017-02-15 RX ADMIN — LEVOTHYROXINE SODIUM 100 MCG: 100 TABLET ORAL at 05:23

## 2017-02-15 RX ADMIN — HYDROMORPHONE HYDROCHLORIDE 1 MG: 1 INJECTION, SOLUTION INTRAMUSCULAR; INTRAVENOUS; SUBCUTANEOUS at 01:15

## 2017-02-15 RX ADMIN — GABAPENTIN 300 MG: 300 CAPSULE ORAL at 22:00

## 2017-02-15 RX ADMIN — NAPROXEN 500 MG: 250 TABLET ORAL at 08:30

## 2017-02-15 RX ADMIN — GABAPENTIN 300 MG: 300 CAPSULE ORAL at 08:30

## 2017-02-15 RX ADMIN — MORPHINE SULFATE 4 MG: 4 INJECTION, SOLUTION INTRAMUSCULAR; INTRAVENOUS at 13:02

## 2017-02-15 RX ADMIN — SODIUM CHLORIDE, POTASSIUM CHLORIDE, SODIUM LACTATE AND CALCIUM CHLORIDE 75 ML/HR: 600; 310; 30; 20 INJECTION, SOLUTION INTRAVENOUS at 13:35

## 2017-02-15 RX ADMIN — FLUOXETINE 20 MG: 20 CAPSULE ORAL at 05:23

## 2017-02-15 RX ADMIN — HYDROMORPHONE HYDROCHLORIDE 1 MG: 1 INJECTION, SOLUTION INTRAMUSCULAR; INTRAVENOUS; SUBCUTANEOUS at 11:40

## 2017-02-15 RX ADMIN — VERAPAMIL HYDROCHLORIDE 180 MG: 180 TABLET, FILM COATED, EXTENDED RELEASE ORAL at 08:30

## 2017-02-15 RX ADMIN — FLUOXETINE 20 MG: 20 CAPSULE ORAL at 13:02

## 2017-02-15 RX ADMIN — MORPHINE SULFATE 4 MG: 4 INJECTION, SOLUTION INTRAMUSCULAR; INTRAVENOUS at 19:34

## 2017-02-15 RX ADMIN — HYDROMORPHONE HYDROCHLORIDE 1 MG: 1 INJECTION, SOLUTION INTRAMUSCULAR; INTRAVENOUS; SUBCUTANEOUS at 03:23

## 2017-02-15 RX ADMIN — NAPROXEN 500 MG: 250 TABLET ORAL at 18:30

## 2017-02-15 RX ADMIN — SODIUM CHLORIDE, POTASSIUM CHLORIDE, SODIUM LACTATE AND CALCIUM CHLORIDE 100 ML/HR: 600; 310; 30; 20 INJECTION, SOLUTION INTRAVENOUS at 05:20

## 2017-02-15 RX ADMIN — OXYCODONE HYDROCHLORIDE AND ACETAMINOPHEN 1 TABLET: 5; 325 TABLET ORAL at 04:32

## 2017-02-15 RX ADMIN — ONDANSETRON HYDROCHLORIDE 4 MG: 4 TABLET, FILM COATED ORAL at 03:23

## 2017-02-15 RX ADMIN — OXYCODONE HYDROCHLORIDE AND ACETAMINOPHEN 1 TABLET: 10; 325 TABLET ORAL at 14:31

## 2017-02-15 RX ADMIN — KETOROLAC TROMETHAMINE 30 MG: 30 INJECTION, SOLUTION INTRAMUSCULAR; INTRAVENOUS at 23:23

## 2017-02-15 RX ADMIN — OXYCODONE HYDROCHLORIDE AND ACETAMINOPHEN 1 TABLET: 10; 325 TABLET ORAL at 18:31

## 2017-02-15 RX ADMIN — NORTRIPTYLINE HYDROCHLORIDE 50 MG: 25 CAPSULE ORAL at 22:01

## 2017-02-15 RX ADMIN — SODIUM CHLORIDE, POTASSIUM CHLORIDE, SODIUM LACTATE AND CALCIUM CHLORIDE 100 ML/HR: 600; 310; 30; 20 INJECTION, SOLUTION INTRAVENOUS at 21:47

## 2017-02-15 RX ADMIN — MORPHINE SULFATE 4 MG: 4 INJECTION, SOLUTION INTRAMUSCULAR; INTRAVENOUS at 16:15

## 2017-02-15 RX ADMIN — APIXABAN 2.5 MG: 2.5 TABLET, FILM COATED ORAL at 13:02

## 2017-02-15 RX ADMIN — OXYCODONE HYDROCHLORIDE AND ACETAMINOPHEN 1 TABLET: 10; 325 TABLET ORAL at 09:24

## 2017-02-15 RX ADMIN — FLUOXETINE 20 MG: 20 CAPSULE ORAL at 22:01

## 2017-02-15 NOTE — PAYOR COMM NOTE
"[facility]  npi [id number]    Utilization Review Nurse  Contact: Anjali Santana RN, BSN  Phone: [phone]  Fax: [phone]    anthem/Attn: nurse review  Inpatient Auth Req  REF:   Roseline Guerrero (56 y.o. Female)     Date of Birth Social Security Number Address Home Phone MRN    1960  [address]   [facility] [address] [phone] [id number]    Protestant Marital Status          Congregational        Admission Date Admission Type Admitting Provider Attending Provider Department, Room/Bed    2/14/17 Elective Wilfred Randle MD Belhasen, Ronald Keith, MD [facility] 2 SOUTH, 210/1S    Discharge Date Discharge Disposition Discharge Destination                      Attending Provider: Wilfred Randle MD     Allergies:  Codeine    Isolation:  None   Infection:  None   Code Status:  FULL    Ht:  67\" (170.2 cm)   Wt:  253 lb (115 kg)    Admission Cmt:  None   Principal Problem:  None                Active Insurance as of 2/14/2017     Primary Coverage     Payor Plan Insurance Group Employer/Plan Group    [employer] EMPLOYEE [id number]     Payor Plan Address Payor Plan Phone Number Effective From Effective To    [address] [phone] 1/1/2015     [place] [address]       Subscriber Name Subscriber Birth Date Member ID       ROSELINE GUERRERO 1960 [id number]                 Emergency Contacts      (Rel.) Home Phone Work Phone Mobile Phone    KoGely lewis (Sister) [phone] -- --    Wilfred Guerrero (Spouse) [phone] -- --               History & Physical      Wilfred Randle MD at 2/1/2017  9:20 AM          History and Physical     Patient: Roseline Guerrero  YOB: 1960  Date of encounter: 02/01/2017        History of Present Illness:   Roseline Guerrero is a 56 y.o. female who presents here today with complaints of bilateral knee pain. She is status post total knee arthroplasty of the left knee. She " states the left knee is doing reasonably well however she's had increased pain in the right knee. She states that in October she fell and landed directly on the knee. Since that fall her right knee has been giving out on her and causing her to fall. She is complaining of significant right knee pain with instability. She states it's worse with prolonged walking.  She's previously had Synvisc injections with no improvement. She is most recently had a cortisone injection without any significant improvement in her knee pain. She denies paresthesias.     PMH:       Patient Active Problem List   Diagnosis   • Migraine with status migrainosus   • Arthralgia of left hip   • Greater trochanteric bursitis   • Primary osteoarthritis of right knee       Medical History         Past Medical History   Diagnosis Date   • Disease of thyroid gland     • Fibromyalgia     • Fibromyalgia     • GERD (gastroesophageal reflux disease)     • Insomnia     • Migraine     • Nephrolithiasis                 PSH:   Surgical History           Past Surgical History   Procedure Laterality Date   • Hysterectomy       • Cholecystectomy       • Gastric banding       • Knee surgery       • Hand surgery       • Carpal tunnel release Bilateral     • Cranial neurostimulator insertion/replacement   01/09/2017       for migraines            Allergies:           Allergies   Allergen Reactions   • Codeine           Medications:      Current Outpatient Prescriptions:   • aspirin-acetaminophen-caffeine (EXCEDRIN MIGRAINE) 250-250-65 MG per tablet, Take 1 tablet by mouth Every 6 (Six) Hours As Needed for headaches., Disp: , Rfl:   • nortriptyline (PAMELOR) 50 MG capsule, Take 50 mg by mouth Every Night., Disp: , Rfl:   • benzonatate (TESSALON) 200 MG capsule, , Disp: , Rfl: 0  • butorphanol (STADOL) 10 MG/ML nasal spray, , Disp: , Rfl: 2  • diclofenac (VOLTAREN) 75 MG EC tablet, , Disp: , Rfl: 1  • dihydroergotamine (MIGRANAL) 4 MG/ML nasal spray, , Disp: , Rfl:  "2  • FLUoxetine (PROzac) 20 MG capsule, Take 20 mg by mouth 3 (three) times a day., Disp: , Rfl:   • gabapentin (NEURONTIN) 300 MG capsule, Take 300 mg by mouth 2 (two) times a day., Disp: , Rfl:   • levothyroxine (SYNTHROID, LEVOTHROID) 100 MCG tablet, Take 100 mcg by mouth daily., Disp: , Rfl:   • promethazine (PHENERGAN) 25 MG tablet, , Disp: , Rfl: 0  • SUMAtriptan (IMITREX) 100 MG tablet, Take 100 mg by mouth every 2 (two) hours as needed for migraine., Disp: , Rfl:   • verapamil PM (VERELAN PM) 180 MG 24 hr capsule, , Disp: , Rfl: 2     Social History:      Social History          Occupational History   • Not on file.           Social History Main Topics   • Smoking status: Never Smoker   • Smokeless tobacco: Not on file   • Alcohol use No   • Drug use: No   • Sexual activity: Not on file      Social History      Social History Narrative         Family History:            Family History   Problem Relation Age of Onset   • Cancer Father     • Hypertension Father     • Hyperlipidemia Mother           Review of Systems:   Pertinent positives are mentioned in the HPI. All other systems were reviewed and found to be negative.  Review of Systems   Constitutional: Negative.   HENT: Negative.   Eyes: Negative.   Respiratory: Negative.   Cardiovascular: Negative.   Gastrointestinal: Negative.   Endocrine: Negative.   Genitourinary: Negative.   Musculoskeletal:   Pertinent positives listed in HPI.  Skin: Negative.   Allergic/Immunologic: Negative.   Neurological: Negative.   Hematological: Negative.   Psychiatric/Behavioral: Negative.            Physical Exam:   General Appearance:  56 y.o. female cooperative, in no acute distress. Alert and oriented x 3,        Vitals        Vitals:     02/01/17 0943   BP: 118/73   BP Location: Right arm   Patient Position: Sitting   Pulse: 98   Weight: 230 lb (104 kg)   Height: 67\" (170.2 cm)          HEENT: Unremarkable  Neck: Supple without lymphadenopathy.  Chest: Clear to " ascultation bilaterally. Normal respiratory effort.  Heart: Regular rate and rhythm. No murmurs noted.  Skin:  Warm and dry without any rash.  Musculoskeletal:  Upper Extremities: Unremarkable.   Lower Extremities: Examination of the right knee reveals mild effusion. She has mild medial joint line tenderness. She is full range of motion. No instability with varus or valgus stressing. Neurovascular status is intact.        Radiology:    AP and lateral views of the bilateral knees were reviewed. The right knee does reveal osteoarthritic changes with slight worsening compared to x-rays obtained for years ago. The left knee reveals total knee arthroplasty with prosthesis in good alignment and position, there is no evidence of loosening of the prosthesis     Assessment      ICD-10-CM ICD-9-CM   1. Primary osteoarthritis of right knee M17.11 715.16         Plan:  A 56-year-old female with primary osteoarthritis of the right knee. She is no longer responding to conservative treatment including Synvisc injections, cortisone injections, and anti-inflammatories. Her pain is getting progressively worse and now causing frequent falls. Given this we have discussed proceeding with a total knee arthroplasty. We discussed the risks, benefits, and future outcomes of surgery. She accepts these risks and is agreeable to surgery. She is a 28 been cleared through her primary care practitioner. We will tentatively place her on the schedule for February 21, 2017 at [facility].     Written by, Camila CABELLO, acting as a scribe for Dr. Jer Beavers I, Wilfred Randle MD, personally performed the services described in this documentation as scribed by the above named individual in my presence, and it is both accurate and complete. 2/1/2017 7:28 PM     This document was signed by Wilfred Randle M.D.  February 1, 2017 7:28 PM          Electronically signed by Wilfred Randle MD at 2/1/2017  7:29  PM      Wilfred Randle MD at 2/1/2017  9:20 AM          History and Physical    Patient: Roseline Steiner  YOB: 1960  Date of encounter: 02/01/2017      History of Present Illness:   Roseline Steiner is a 56 y.o. female who presents here today with complaints of bilateral knee pain. She is status post total knee arthroplasty of the left knee. She states the left knee is doing reasonably well however she's had increased pain in the right knee. She states that in October she fell and landed directly on the knee. Since that fall her right knee has been giving out on her and causing her to fall. She is complaining of significant right knee pain with instability. She states it's worse with prolonged walking.  She's previously had Synvisc injections with no improvement.  She is most recently had a cortisone injection without any significant improvement in her knee pain.  She denies paresthesias.    PMH:   Patient Active Problem List   Diagnosis   • Migraine with status migrainosus   • Arthralgia of left hip   • Greater trochanteric bursitis   • Primary osteoarthritis of right knee     Past Medical History   Diagnosis Date   • Disease of thyroid gland    • Fibromyalgia    • Fibromyalgia    • GERD (gastroesophageal reflux disease)    • Insomnia    • Migraine    • Nephrolithiasis          PSH:  Past Surgical History   Procedure Laterality Date   • Hysterectomy     • Cholecystectomy     • Gastric banding     • Knee surgery     • Hand surgery     • Carpal tunnel release Bilateral    • Cranial neurostimulator insertion/replacement  01/09/2017     for migraines       Allergies:     Allergies   Allergen Reactions   • Codeine        Medications:     Current Outpatient Prescriptions:   •  aspirin-acetaminophen-caffeine (EXCEDRIN MIGRAINE) 250-250-65 MG per tablet, Take 1 tablet by mouth Every 6 (Six) Hours As Needed for headaches., Disp: , Rfl:   •  nortriptyline (PAMELOR) 50 MG capsule, Take 50 mg by mouth Every  Night., Disp: , Rfl:   •  benzonatate (TESSALON) 200 MG capsule, , Disp: , Rfl: 0  •  butorphanol (STADOL) 10 MG/ML nasal spray, , Disp: , Rfl: 2  •  diclofenac (VOLTAREN) 75 MG EC tablet, , Disp: , Rfl: 1  •  dihydroergotamine (MIGRANAL) 4 MG/ML nasal spray, , Disp: , Rfl: 2  •  FLUoxetine (PROzac) 20 MG capsule, Take 20 mg by mouth 3 (three) times a day., Disp: , Rfl:   •  gabapentin (NEURONTIN) 300 MG capsule, Take 300 mg by mouth 2 (two) times a day., Disp: , Rfl:   •  levothyroxine (SYNTHROID, LEVOTHROID) 100 MCG tablet, Take 100 mcg by mouth daily., Disp: , Rfl:   •  promethazine (PHENERGAN) 25 MG tablet, , Disp: , Rfl: 0  •  SUMAtriptan (IMITREX) 100 MG tablet, Take 100 mg by mouth every 2 (two) hours as needed for migraine., Disp: , Rfl:   •  verapamil PM (VERELAN PM) 180 MG 24 hr capsule, , Disp: , Rfl: 2    Social History:     Social History     Occupational History   • Not on file.     Social History Main Topics   • Smoking status: Never Smoker   • Smokeless tobacco: Not on file   • Alcohol use No   • Drug use: No   • Sexual activity: Not on file      Social History     Social History Narrative       Family History:     Family History   Problem Relation Age of Onset   • Cancer Father    • Hypertension Father    • Hyperlipidemia Mother        Review of Systems:   Pertinent positives are mentioned in the HPI. All other systems were reviewed and found to be negative.  Review of Systems   Constitutional: Negative.    HENT: Negative.    Eyes: Negative.    Respiratory: Negative.    Cardiovascular: Negative.    Gastrointestinal: Negative.    Endocrine: Negative.    Genitourinary: Negative.    Musculoskeletal:        Pertinent positives listed in HPI.     Skin: Negative.    Allergic/Immunologic: Negative.    Neurological: Negative.    Hematological: Negative.    Psychiatric/Behavioral: Negative.          Physical Exam:   General Appearance:    56 y.o. female  cooperative, in no acute distress.  Alert and oriented  "x 3,                   Vitals:    02/01/17 0943   BP: 118/73   BP Location: Right arm   Patient Position: Sitting   Pulse: 98   Weight: 230 lb (104 kg)   Height: 67\" (170.2 cm)          HEENT: Unremarkable      Neck: Supple without lymphadenopathy.      Chest: Clear to ascultation bilaterally. Normal respiratory effort.      Heart: Regular rate and rhythm. No murmurs noted.      Skin:  Warm and dry without any rash.      Musculoskeletal:  Upper Extremities: Unremarkable.   Lower Extremities: Examination of the right knee reveals mild effusion. She has mild medial joint line tenderness. She is full range of motion. No instability with varus or valgus stressing. Neurovascular status is intact.      Radiology:     AP and lateral views of the bilateral knees were reviewed. The right knee does reveal osteoarthritic changes with slight worsening compared to x-rays obtained for years ago. The left knee reveals total knee arthroplasty with prosthesis in good alignment and position, there is no evidence of loosening of the prosthesis    Assessment    ICD-10-CM ICD-9-CM   1. Primary osteoarthritis of right knee M17.11 715.16       Plan:  A 56-year-old female with primary osteoarthritis of the right knee.  She is no longer responding to conservative treatment including Synvisc injections, cortisone injections, and anti-inflammatories.  Her pain is getting progressively worse and now causing frequent falls.  Given this we have discussed proceeding with a total knee arthroplasty.  We discussed the risks, benefits, and future outcomes of surgery.  She accepts these risks and is agreeable to surgery.  She is a 28 been cleared through her primary care practitioner.  We will tentatively place her on the schedule for February 21, 2017 at [facility].    Written by, Camila CABELLO, acting as a scribe for Dr. Jer Beavers I, Wilfred Randle MD, personally performed the services described in this " documentation as scribed by the above named individual in my presence, and it is both accurate and complete.  2/1/2017  7:28 PM    This document was signed by Wilfred Randle M.D.  February 1, 2017 7:28 PM         Electronically signed by Wilfred Randle MD at 2/1/2017  7:37 PM      Wilfred Randle MD at 2/14/2017  7:23 AM          H&P reviewed. The patient was examined and there are no changes to the H&P.     Electronically signed by Wilfred Randle MD at 2/14/2017  7:23 AM      Wilfrde Randle MD at 2/14/2017  7:25 AM          H&P reviewed. The patient was examined and there are no changes to the H&P.     Electronically signed by Wilfred Randle MD at 2/14/2017  7:25 AM           Operative/Procedure Notes (last 72 hours) (Notes from 2/12/2017 10:37 AM through 2/15/2017 10:37 AM)      Op Note signed by Wilfred Randle MD at 2/14/2017  9:22 PM   Version 1 of 1          DATE OF SURGERY: 02/14/2017    PREOPERATIVE DIAGNOSIS: Osteoarthritis, right knee.     POSTOPERATIVE DIAGNOSIS: Osteoarthritis, right knee.     PROCEDURE: Right total knee arthroplasty, cemented.     ANESTHESIA: General plus femoral nerve block.     ASSISTANT:  Anibal Matthews CSA    SURGEON:  Wilfred Randle MD    ANTIBIOTICS: Then 2 g of Ancef.      BLOOD LOSS: 200 mL.      IMPLANT COMPONENTS: Attune, attune posterior stabilized size 3, rotating platform tibial insert posterior stabilized size 3 x 8.  Attune patella medialized 35 mm, and attune tibial base rotating platform size 4.     DESCRIPTION OF PROCEDURE:  With the patient in the operating theater, general anesthetic administered, and with a femoral nerve block administered in the preoperative area, the right leg was placed in a tourniquet. The extremity was sterilely prepped and draped in the usual manner. Tourniquet left deflated. A midline incision was made. A medial parapatellar arthrotomy was created.  Patella everted and the knee flexed,  osteophytes were removed from the femur tibia and patella. The distal femur was cannulated and with the intramedullary guide positioned, the distal femoral cut was made at 9 mm.  Anterior, posterior, and chamfer cuts created. Intercondylar notch osteotomized and a femoral component trial positioned and found to be appropriate. The lugs drilled it was removed. External tibial alignment device then positioned for the tibia with both menisci excised and the anterior cruciate ligament excised. The proximal tibial osteotomy was created 6 mm off the low side. With the trial tibial tray positioned, keel and broach were created. A variety of inserts were positioned after repositioning of the femoral component until the appropriate thickness was determined. A slight medial release with Regan elevator was completed. Posterior aspect of the patella was osteomized drilled for a 3 peg patellar button. The extremity was exsanguinated. The joint was copiously irrigated. Cement was prepared. All 3 components were precoated and then cemented into place. Once cement had hardened, excess cement was removed. Again variety of tibial inserts positioned until appropriate thickness was determined. The tourniquet was deflated. Hemostasis was obtained. The tibial insert positioned and the knee again reduced taken through motion and confirmed to have good medial lateral stability, full extension and good posterior rollback. The wound was copiously irrigated, closed in layers over a Hemovac drain, staples for final closure. A sterile dressing applied with a knee immobilizer. She was lightened from anesthetic and taken to the recovery room in stable condition.       D: MILDRED 02/14/2017 10:43:41 ET  T: tls / 02/14/2017 11:05:41 ET  Revision Count: 0  Job ID: [id number]  [id number]  cc:        Dictator Signature:___________________________     Wilfred Randle M.D.     Electronically signed by Wilfred Randle MD at 2/14/2017  9:22 PM       Wilfred Randle MD at 2/14/2017  9:56 AM  Version 1 of 1         TOTAL KNEE ARTHROPLASTY  Procedure Note    Roseline Steiner  2/14/2017    Pre-op Diagnosis:   Preop testing [Z01.818]  Primary osteoarthritis of right knee [M17.11]    Post-op Diagnosis:     Post-Op Diagnosis Codes:     * Preop testing [Z01.818]     * Primary osteoarthritis of right knee [M17.11]    Procedure/CPT® Codes:      Procedure(s):  TOTAL KNEE ARTHROPLASTY right    Surgeon(s):  Wilfred Randle MD    Anesthesia: general/fnb    Staff:   Circulator: Marcelino Waller RN  Scrub Person: Rebecca Fuller  Vendor Representative: Casper Causey  Assistant: Anibal Matthews CSA  Other: Dominick Sexton    Estimated Blood Loss: 200 mL    Specimens:                * No specimens in log *      Drains:   Y Collapsible Closed Device 1 and 2 02/14/17 0935 (Active)       Urethral Catheter 02/14/17 0743 100% silicone 16 10 10 (Active)           Findings:     Complications:       Wilfred Randle MD     Date: 2/14/2017  Time: 9:56 AM         Electronically signed by Wilfred Randle MD at 2/14/2017  9:57 AM           Physician Progress Notes (last 72 hours) (Notes from 2/12/2017 10:37 AM through 2/15/2017 10:37 AM)      Samuel Duane Kreis, MD at 2/15/2017  6:40 AM  Version 2 of 2              LOS: 1 day     Chief Complaint:  Right Knee OA S/P TKA    Subjective     Interval History:   is postop day #1 from total knee arthroplasty.  Patient states that she did not sleep much overnight secondary to ongoing throbbing/aching pain in the right knee despite Dilaudid plus Percocet. She has also been having severe dry mouth. She denies any other complaints at this point.  Blood counts have dropped overnight from 12.2-7.9. She denies any black or tarry stools  She denies any chest pain shortness of breath or nausea or vomiting.  Headaches are well controlled.  Blood pressure stable.  Patient has been evaluated by physical therapy.      Objective     Vital  "Signs  Visit Vitals   • /56 (BP Location: Right arm, Patient Position: Lying)   • Pulse 83   • Temp 98.5 °F (36.9 °C) (Oral)   • Resp 18   • Ht 67\" (170.2 cm)   • Wt 253 lb (115 kg)  Comment: bed   • SpO2 97%   • Breastfeeding No   • BMI 39.63 kg/m2     Intake & Output (last day)       02/14 0701 - 02/15 0700    P.O. 1050    I.V. (mL/kg) 3590 (31.3)    Total Intake(mL/kg) 4640 (40.4)    Urine (mL/kg/hr) 4075 (1.5)    Other 725 (0.3)    Stool 0 (0)    Blood 200 (0.1)    Total Output 5000    Net -360         Unmeasured Stool Occurrence 0 x            Physical Exam:     General Appearance:    Alert, cooperative, in no acute distress   Head:    Normocephalic, without obvious abnormality, atraumatic   Eyes:            Lids and lashes normal, conjunctivae and sclerae normal, no   icterus, no pallor, corneas clear, PERRLA   Ears:    Ears appear intact with no abnormalities noted   Throat:   No oral lesions, no thrush, oral mucosa moist   Neck:   No adenopathy, supple, trachea midline, no thyromegaly, no   carotid bruit, no JVD   Lungs:     Clear to auscultation,respirations regular, even and                  unlabored    Heart:    Regular rhythm and normal rate, normal S1 and S2, no            murmur, no gallop, no rub, no click   Chest Wall:    No abnormalities observed   Abdomen:     Normal bowel sounds, no masses, no organomegaly, soft        non-tender, non-distended, no guarding, no rebound                tenderness   Extremities:   RLE in immobilizer, noted RLE edema, no cyanosis, no             redness   Pulses:   Pulses palpable and equal bilaterally   Skin:   No bleeding, bruising or rash   Lymph nodes:   No palpable adenopathy   Neurologic:   Cranial nerves 2 - 12 grossly intact, sensation intact, DTR       present and equal bilaterally        Results Review:    Lab Results   Component Value Date    WBC 8.31 02/15/2017    HGB 7.9 (L) 02/15/2017    HCT 26.3 (L) 02/15/2017    .0 (H) 02/15/2017    PLT " 205 02/15/2017       Lab Results   Component Value Date    GLUCOSE 81 02/09/2017    BUN 14 02/09/2017    CREATININE 0.87 02/09/2017    EGFRIFNONA 67 02/09/2017    BCR 16.1 02/09/2017     02/09/2017    K 3.8 02/09/2017     02/09/2017    CO2 32.1 (H) 02/09/2017    CALCIUM 9.4 02/09/2017    ALBUMIN 4.10 06/28/2016    LABIL2 1.5 06/28/2016    AST 15 06/28/2016    ALT 17 06/28/2016     Lab Results   Component Value Date    INR 1.89 10/10/2014    INR 1.54 10/09/2014    INR 1.10 10/08/2014       No results found for: POCGLU       Medication Review:     Current Facility-Administered Medications:   •  apixaban (ELIQUIS) tablet 2.5 mg, 2.5 mg, Oral, Q12H, Wilfred Randle MD  •  butorphanol (STADOL) nasal spray 1 spray, 1 spray, Nasal, PRN, Wilfred Randle MD  •  dihydroergotamine (MIGRANAL) nasal spray 1 spray, 1 spray, Nasal, PRN, Wilfred Randle MD  •  docusate sodium (COLACE) capsule 100 mg, 100 mg, Oral, BID PRN, Wilfred Randle MD  •  FLUoxetine (PROzac) capsule 20 mg, 20 mg, Oral, Q8H, Wilfred Randle MD, 20 mg at 02/15/17 0523  •  gabapentin (NEURONTIN) capsule 300 mg, 300 mg, Oral, Q12H, Wilfred Randle MD, 300 mg at 02/14/17 2038  •  HYDROmorphone (DILAUDID) injection 1 mg, 1 mg, Intravenous, Q2H PRN, 1 mg at 02/15/17 0323 **AND** naloxone (NARCAN) injection 0.1 mg, 0.1 mg, Intravenous, Q5 Min PRN, Wilfred Randle MD  •  lactated ringers infusion, 75 mL/hr, Intravenous, Continuous, Wilfred Randle MD, Stopped at 02/14/17 0950  •  lactated ringers infusion, 125 mL/hr, Intravenous, Continuous, Brant Flores MD, Last Rate: 125 mL/hr at 02/14/17 1134, 125 mL/hr at 02/14/17 1134  •  lactated ringers infusion, 100 mL/hr, Intravenous, Continuous, Wilfred Randle MD, Last Rate: 100 mL/hr at 02/15/17 0520, 100 mL/hr at 02/15/17 0520  •  levothyroxine (SYNTHROID, LEVOTHROID) tablet 100 mcg, 100 mcg, Oral, Q AM, Wilfred Randle MD, 100 mcg  at 02/15/17 0523  •  naproxen (NAPROSYN) tablet 500 mg, 500 mg, Oral, BID With Meals, Wilfred Randle MD, 500 mg at 02/14/17 1748  •  nortriptyline (PAMELOR) capsule 50 mg, 50 mg, Oral, Nightly, Wilfred Randle MD, 50 mg at 02/14/17 2038  •  ondansetron (ZOFRAN) tablet 4 mg, 4 mg, Oral, Q6H PRN, Wilfred Randle MD, 4 mg at 02/15/17 0323  •  oxyCODONE-acetaminophen (PERCOCET) 5-325 MG per tablet 1 tablet, 1 tablet, Oral, Q4H PRN, Wilfred Randle MD, 1 tablet at 02/15/17 0432  •  verapamil SR (CALAN-SR) CR tablet 180 mg, 180 mg, Oral, Q24H, Wilfred Randle MD      Assessment&#47;Plan osteoarthritis of right knee S/P TKA  Migraine Headaches  Insomnia  Acute Blood Loss Anemia    Dilaudid + percocet prn pain    IVF @125 ml/hr    Add Oral care swabs prn  gabapentin, nortriptyline and gabapentin for migraine prophylaxis.    Eliquis for DVT prophylaxis   with PT    Monitor blood counts, transfuse <7.0    Repeat H&H this AM with Serum iron, ferritin, TIBC, B12, and folate, Retic count, stools x 3    DESTINEY Pablo  02/15/17  6:40 AM   patient was seen this morning by me.  I agree with the above note.  She has a drop in her hemoglobin is 7.9.  She was not anemic preoperatively.  She's having significant pain in the right knee.  Hydromorphone and Percocet are not helping much.  Lungs are clear to auscultation bilaterally.  Cardiac exam reveals regular rate and rhythm.  Increase Percocet to Percocet 10 every 4 hours when necessary for pain.  Toradol 15 mg IV x 1 dose.    Samuel Duane Kreis, MD  02/15/17  7:16 AM         Electronically signed by Samuel Duane Kreis, MD at 2/15/2017  7:17 AM      DESTINEY Pablo at 2/15/2017  6:40 AM  Version 1 of 2              LOS: 1 day     Chief Complaint:  Right Knee OA S/P TKA    Subjective     Interval History:   is postop day #1 from total knee arthroplasty.  Patient states that she did not sleep much overnight secondary to ongoing throbbing/aching  "pain in the right knee despite Dilaudid plus Percocet. She has also been having severe dry mouth. She denies any other complaints at this point.  Blood counts have dropped overnight from 12.2-7.9. She denies any black or tarry stools  She denies any chest pain shortness of breath or nausea or vomiting.  Headaches are well controlled.  Blood pressure stable.  Patient has been evaluated by physical therapy.      Objective     Vital Signs  Visit Vitals   • /56 (BP Location: Right arm, Patient Position: Lying)   • Pulse 83   • Temp 98.5 °F (36.9 °C) (Oral)   • Resp 18   • Ht 67\" (170.2 cm)   • Wt 253 lb (115 kg)  Comment: bed   • SpO2 97%   • Breastfeeding No   • BMI 39.63 kg/m2     Intake & Output (last day)       02/14 0701 - 02/15 0700    P.O. 1050    I.V. (mL/kg) 3590 (31.3)    Total Intake(mL/kg) 4640 (40.4)    Urine (mL/kg/hr) 4075 (1.5)    Other 725 (0.3)    Stool 0 (0)    Blood 200 (0.1)    Total Output 5000    Net -360         Unmeasured Stool Occurrence 0 x            Physical Exam:     General Appearance:    Alert, cooperative, in no acute distress   Head:    Normocephalic, without obvious abnormality, atraumatic   Eyes:            Lids and lashes normal, conjunctivae and sclerae normal, no   icterus, no pallor, corneas clear, PERRLA   Ears:    Ears appear intact with no abnormalities noted   Throat:   No oral lesions, no thrush, oral mucosa moist   Neck:   No adenopathy, supple, trachea midline, no thyromegaly, no   carotid bruit, no JVD   Lungs:     Clear to auscultation,respirations regular, even and                  unlabored    Heart:    Regular rhythm and normal rate, normal S1 and S2, no            murmur, no gallop, no rub, no click   Chest Wall:    No abnormalities observed   Abdomen:     Normal bowel sounds, no masses, no organomegaly, soft        non-tender, non-distended, no guarding, no rebound                tenderness   Extremities:   RLE in immobilizer, noted RLE edema, no cyanosis, no   "           redness   Pulses:   Pulses palpable and equal bilaterally   Skin:   No bleeding, bruising or rash   Lymph nodes:   No palpable adenopathy   Neurologic:   Cranial nerves 2 - 12 grossly intact, sensation intact, DTR       present and equal bilaterally        Results Review:    Lab Results   Component Value Date    WBC 8.31 02/15/2017    HGB 7.9 (L) 02/15/2017    HCT 26.3 (L) 02/15/2017    .0 (H) 02/15/2017     02/15/2017       Lab Results   Component Value Date    GLUCOSE 81 02/09/2017    BUN 14 02/09/2017    CREATININE 0.87 02/09/2017    EGFRIFNONA 67 02/09/2017    BCR 16.1 02/09/2017     02/09/2017    K 3.8 02/09/2017     02/09/2017    CO2 32.1 (H) 02/09/2017    CALCIUM 9.4 02/09/2017    ALBUMIN 4.10 06/28/2016    LABIL2 1.5 06/28/2016    AST 15 06/28/2016    ALT 17 06/28/2016     Lab Results   Component Value Date    INR 1.89 10/10/2014    INR 1.54 10/09/2014    INR 1.10 10/08/2014       No results found for: POCGLU       Medication Review:     Current Facility-Administered Medications:   •  apixaban (ELIQUIS) tablet 2.5 mg, 2.5 mg, Oral, Q12H, Wilfred Randle MD  •  butorphanol (STADOL) nasal spray 1 spray, 1 spray, Nasal, PRN, Wilfred Randle MD  •  dihydroergotamine (MIGRANAL) nasal spray 1 spray, 1 spray, Nasal, PRN, Wilfred Randle MD  •  docusate sodium (COLACE) capsule 100 mg, 100 mg, Oral, BID PRN, Wilfred Randle MD  •  FLUoxetine (PROzac) capsule 20 mg, 20 mg, Oral, Q8H, Wilfred Randle MD, 20 mg at 02/15/17 0523  •  gabapentin (NEURONTIN) capsule 300 mg, 300 mg, Oral, Q12H, Wilfred Randle MD, 300 mg at 02/14/17 2038  •  HYDROmorphone (DILAUDID) injection 1 mg, 1 mg, Intravenous, Q2H PRN, 1 mg at 02/15/17 0323 **AND** naloxone (NARCAN) injection 0.1 mg, 0.1 mg, Intravenous, Q5 Min PRN, Wilfred Randle MD  •  lactated ringers infusion, 75 mL/hr, Intravenous, Continuous, Wilfred Randle MD, Stopped at 02/14/17  0950  •  lactated ringers infusion, 125 mL/hr, Intravenous, Continuous, Brant Flores MD, Last Rate: 125 mL/hr at 02/14/17 1134, 125 mL/hr at 02/14/17 1134  •  lactated ringers infusion, 100 mL/hr, Intravenous, Continuous, Wilfred Randle MD, Last Rate: 100 mL/hr at 02/15/17 0520, 100 mL/hr at 02/15/17 0520  •  levothyroxine (SYNTHROID, LEVOTHROID) tablet 100 mcg, 100 mcg, Oral, Q AM, Wilfred Randle MD, 100 mcg at 02/15/17 0523  •  naproxen (NAPROSYN) tablet 500 mg, 500 mg, Oral, BID With Meals, Wilfred Randle MD, 500 mg at 02/14/17 1748  •  nortriptyline (PAMELOR) capsule 50 mg, 50 mg, Oral, Nightly, Wilfred Randle MD, 50 mg at 02/14/17 2038  •  ondansetron (ZOFRAN) tablet 4 mg, 4 mg, Oral, Q6H PRN, Wilfred Randle MD, 4 mg at 02/15/17 0323  •  oxyCODONE-acetaminophen (PERCOCET) 5-325 MG per tablet 1 tablet, 1 tablet, Oral, Q4H PRN, Wilfred Randle MD, 1 tablet at 02/15/17 0432  •  verapamil SR (CALAN-SR) CR tablet 180 mg, 180 mg, Oral, Q24H, Wilfred Randle MD      Assessment&#47;Plan osteoarthritis of right knee S/P TKA  Migraine Headaches  Insomnia  Acute Blood Loss Anemia    Dilaudid + percocet prn pain    IVF @125 ml/hr    Add Oral care swabs prn  gabapentin, nortriptyline and gabapentin for migraine prophylaxis.    Eliquis for DVT prophylaxis   with PT    Monitor blood counts, transfuse <7.0    Repeat H&H this AM with Serum iron, ferritin, TIBC, B12, and folate, Retic count, stools x 3    DESTINEY Pablo  02/15/17  6:40 AM     Electronically signed by DESTINEY Pablo at 2/15/2017  6:51 AM      DESTINEY Uribe at 2/15/2017  8:09 AM  Version 1 of 1         Inpatient Progress Note  Roseline Steiner  Date: 02/15/17  MRN: [id number]      Subjective:  Roseline Steiner is a 56 y.o. POD#1 right TKA. She is complaining of moderate pain especially with movement and when transferring.  She denies paresthesias. She has no new complaints.       Objective:    Vitals:    02/14/17 1837 02/14/17 2330 02/15/17 0411 02/15/17 0630   BP: 110/59 122/55 116/63 100/56   BP Location: Right arm Right arm Right arm Right arm   Patient Position: Lying Lying Lying Lying   Pulse: 101 89 94 83   Resp: 18 18 18 18   Temp: 98.3 °F (36.8 °C) 97.9 °F (36.6 °C) 98.3 °F (36.8 °C) 98.5 °F (36.9 °C)   TempSrc: Oral Oral Oral Oral   SpO2: 97% 98% 97% 97%   Weight:       Height:         Examination of the right knee reveals aquacel dressing intact with mild post op drainage. She has mild swelling right knee. No calf tenderness. Active dorsiflexion and plantarflexion. Neurovascular status is intact.     Labs:      Results from last 7 days  Lab Units 02/15/17  0729 02/15/17  0057 02/09/17  1037   WBC 10*3/mm3  --  8.31 5.66   HEMOGLOBIN g/dL 8.3* 7.9* 12.2   HEMATOCRIT % 25.9* 26.3* 39.8   MCV fL  --  100.0* 96.8*   MCHC g/dL  --  30.0* 30.7*   PLATELETS 10*3/mm3  --  205 239           Results from last 7 days  Lab Units 02/09/17  1037   SODIUM mmol/L 142   POTASSIUM mmol/L 3.8   CHLORIDE mmol/L 106   TOTAL CO2 mmol/L 32.1*   BUN mg/dL 14   CREATININE mg/dL 0.87   EGFR IF NONAFRICN AM mL/min/1.73 67   CALCIUM mg/dL 9.4   GLUCOSE mg/dL 81   Estimated Creatinine Clearance: 92.4 mL/min (by C-G formula based on Cr of 0.87).  No results found for: AMMONIA          No results found for: HGBA1C  No results found for: TSH, FREET4      Pain Management Panel     Pain Management Panel Latest Ref Rng 9/12/2015    AMPHETAMINES SCREEN, URINE Negative Negative    BARBITURATES SCREEN Negative Negative    BENZODIAZEPINE SCREEN, URINE Negative Negative    COCAINE SCREEN, URINE Negative Negative    METHADONE SCREEN, URINE Negative Negative              Radiology:  Imaging Results (last 72 hours)     Procedure Component Value Units Date/Time    XR Knee 1 or 2 View Right [[id number]] Collected:  02/14/17 1050     Updated:  02/14/17 1052    Narrative:       EXAMINATION: XR KNEE 1 OR 2 VW RIGHT-      XR  KNEE 1 OR 2 VW RIGHT-      INDICATION: POST SURG subsequent     COMPARISON: 1/18/2017     FINDINGS: Total right knee arthroplasty. Alignment maintained. Soft  tissue postsurgical change.          Impression:       As above.              This report was finalized on 2/14/2017 10:50 AM by Dr. Chris Ring MD.               Assessment:      ICD-10-CM ICD-9-CM   1. Primary osteoarthritis of right knee M17.11 715.16   2. Preop testing Z01.818 V72.84       Plan:  POD#1 Right TKA. Patient is stable. She has moderate pain. Continue knee immobilizer as tolerated. Begin PT/OT per protocol today. Will watch H&H. SS to initiate discharge planning. Continue Eliquis for DVT prophylaxis.       DESTINEY Uribe            Electronically signed by DESTINEY Uribe at 2/15/2017  8:27 AM           Consult Notes (last 72 hours) (Notes from 2/12/2017 10:38 AM through 2/15/2017 10:38 AM)      Samuel Duane Kreis, MD at 2/14/2017  4:45 PM  Version 1 of 1               Chief complaint arthritis of the right knee    Subjective     Patient is a 56 y.o. female presents with as an admit to [facility] secondary to right knee end-stage osteoarthritis requiring total knee arthroplasty.  The patient had a left total knee arthroplasty about 5 years ago.  She's done very well with that.  She has progressively worsening right knee pain and weakness with multiple falls with her knee giving out beneath her with impaired mobility.  She had tried corticosteroid-induced injection without any success.  She states that the pain continued to get progressively worse so ultimately she decided have a totally arthroplasty.  She was having a little bit of swelling in the knee.  She is now postop day #0 status post total knee arthroplasty.  It's been rather painful but states overall she thinks is going to do well.  She denies any shortness of breath, cough, chest pains or palpitations.  She has been using oral medications.  She states that  overall her pain is been pretty well managed postoperatively.  She has a long history of migraine headaches and had a neurostimulator placed last year.  It is significantly decreased the frequency and intensity of her migraine headaches.  She uses Maxalt when necessary for migraines.  She continues on verapamil and on when necessary Maxalt and gabapentin for migraine headache prevention.  No longer taking topiramate secondary to memory loss caused by that medication.  She does occasionally use Stadol.    Review of Systems   On review of systems the patient denies the following unless noted above:     Constitutional:  Fevers, chills, weight change, fatigue     Eyes: Vision changes, headache, double vision, loss of vision     ENT: Runny nose, nose bleeds, ringing in ears, pain with swallowing, sore throat     Cardiovascular: Chest pains, palpitations, PND, orthopnea     Respiratory: Cough, wheezing, SOA, hemoptysis     GI:  Abdominal pain, diarrhea, constipation, change in stool caliber,    Rectal bleeding, vomiting or nausea     : Difficulty voiding, dysuria, hematuria     Musculoskeletal: Changes of any chronic joint pain, swelling     Skin: Rash, itching, easy bruisability     Neurological: Unilateral weakness, new onset numbness, speech difficulties     Psychiatric: Sadness, tearfulness, feelings of hopelessness, racing thoughts     Endocrine:  Heat or cold intolerance, mood swings, polydipsia, polyphagia,    recent hypoglycemia      History  Past Medical History   Diagnosis Date   • Arthritis    • Disease of thyroid gland    • Fibromyalgia    • Fibromyalgia    • GERD (gastroesophageal reflux disease)    • Insomnia    • Migraine    • Nephrolithiasis    • PONV (postoperative nausea and vomiting)    • Spinal headache      Past Surgical History   Procedure Laterality Date   • Hysterectomy     • Cholecystectomy     • Gastric banding     • Knee surgery     • Hand surgery     • Carpal tunnel release Bilateral    •  Cranial neurostimulator insertion/replacement  01/09/2017     for migraines   • Abdominal surgery     • Eye surgery Bilateral      Cataract   • Joint replacement  2012   • Joint replacement Right 02/14/2017     right total knee  [facility]  dr farah     Family History   Problem Relation Age of Onset   • Cancer Father    • Hypertension Father    • Hyperlipidemia Mother      Social History   Substance Use Topics   • Smoking status: Never Smoker   • Smokeless tobacco: Never Used   • Alcohol use No     Prescriptions Prior to Admission   Medication Sig Dispense Refill Last Dose   • butorphanol (STADOL) 10 MG/ML nasal spray 1 spray into each nostril As Needed for mild pain (1-3) or moderate pain (4-6) (migraine, take if migranol doesn't work). May repeat 1 spray in 60 min if first spray doesn't achieve good migraine control.  2 2/13/2017 at 2200   • dihydroergotamine (MIGRANAL) 4 MG/ML nasal spray 1 spray into each nostril As Needed for migraine.  2 2/13/2017 at 2200   • FLUoxetine (PROzac) 20 MG capsule Take 20 mg by mouth 3 (three) times a day.   2/13/2017 at 2200   • gabapentin (NEURONTIN) 300 MG capsule Take 300 mg by mouth 2 (two) times a day.   2/14/2017 at 0500   • levothyroxine (SYNTHROID, LEVOTHROID) 100 MCG tablet Take 100 mcg by mouth daily.   2/14/2017 at 0500   • nortriptyline (PAMELOR) 50 MG capsule Take 50 mg by mouth Every Night.   2/13/2017 at 2200   • verapamil PM (VERELAN PM) 180 MG 24 hr capsule Take 180 mg by mouth Daily.  2 2/14/2017 at 0500   • diclofenac (VOLTAREN) 75 MG EC tablet Take 75 mg by mouth 2 (Two) Times a Day. Was told to hold for upcoming knee surgery  1 01/31/2017 at unknown     Allergies:  Codeine    Scheduled Meds:  [START ON 2/15/2017] apixaban 2.5 mg Oral Q12H   ceFAZolin 2 g Intravenous Q8H   FLUoxetine 20 mg Oral Q8H   gabapentin 300 mg Oral Q12H   [START ON 2/15/2017] levothyroxine 100 mcg Oral Q AM   naproxen 500 mg Oral BID With Meals   nortriptyline 50 mg Oral Nightly   [START ON  "2/15/2017] verapamil  mg Oral Q24H     Continuous Infusions:  lactated ringers 75 mL/hr Last Rate: Stopped (02/14/17 0950)   lactated ringers 125 mL/hr Last Rate: 125 mL/hr (02/14/17 1134)   lactated ringers 100 mL/hr      PRN Meds:.butorphanol  •  dihydroergotamine  •  docusate sodium  •  HYDROmorphone **AND** naloxone  •  ondansetron  •  oxyCODONE-acetaminophen          Objective     Vital Signs    Visit Vitals   • /69 (BP Location: Right arm, Patient Position: Lying)   • Pulse 106   • Temp 97.8 °F (36.6 °C) (Oral)   • Resp 18   • Ht 67\" (170.2 cm)   • SpO2 96%   • Breastfeeding No           Physical Exam:   General Appearance: alert, pleasant, appears stated age, interactive and   cooperative   Head: normocephalic, without obvious abnormality and atraumatic   Eyes: lids and lashes normal, conjunctivae and sclerae normal, no icterus, no   pallor, corneas clear and PERRLA   Ears: ears appear intact with no abnormalities noted   Nose: nares normal, septum midline, mucosa normal and no drainage   Throat: no oral lesions, no thrush, oral mucosa moist and oopharynx normal   Neck: no adenopathy, supple, trachea midline, no thyromegaly, no carotid bruit   and no JVD   Back: no kyphosis present, no scoliosis present, no skin lesions, erythema, or   scars, no tenderness to percussion or palpation and range of motion normal   Lungs: clear to auscultation, respirations regular, respirations even and    respirations unlabored. No accessory muscle use.    Heart:: regular rhythm & normal rate, normal S1, S2, no murmur, no gallop, no   rub and no click.  Chest wall with no abnormalities observed. PMI nondisplaced   Abdomen: normal bowel sounds in all quadrants, no masses, no hepatomegaly,   no splenomegaly, soft non-tender, no guarding and no rebound tenderness   Extremities: no edema, no cyanosis, no redness, no tenderness, no clubbing   Musculoskeletal: Left knee with normal range of motion.  No instability.  Right " knee is immobilized.  There is a drain in place.     Neurologic: Mental Status orientated to person, place, time and situation.    Speech is intelligible, Nonlabored.  Alertness alert and awake and mood/affect   normal, Cranial Nerves 2 - 12 grossly intact as examined   Sensory intact in BLE and BUE.   Motor strength  LUE is 5/5 proximally, 5/5 distally      RUE is 5/5 proximally, 5/5 distally      LLE is 5/5 @ hip flexors, quads as well as       dorsiflexion / plantar flexion      RLE is hip flexors and quadriceps cannot be assessed      dosriflexion / plantar flexion 5/5   Reflexes: Right:  2+ biceps, 2+ brachioradialis      2+ patella, 2+ achilles     Left: 2+ biceps, 2+ brachioradialis      2+ patella, 2+ achilles    Results Review:   Lab Results (last 24 hours)     ** No results found for the last 24 hours. **        Imaging Results (last 24 hours)     Procedure Component Value Units Date/Time    XR Knee 1 or 2 View Right [[id number]] Collected:  02/14/17 1050     Updated:  02/14/17 1052    Narrative:       EXAMINATION: XR KNEE 1 OR 2 VW RIGHT-      XR KNEE 1 OR 2 VW RIGHT-      INDICATION: POST SURG subsequent     COMPARISON: 1/18/2017     FINDINGS: Total right knee arthroplasty. Alignment maintained. Soft  tissue postsurgical change.          Impression:       As above.              This report was finalized on 2/14/2017 10:50 AM by Dr. Chris Ring MD.             Assessment&#47;Plan   Problems:    Primary osteoarthritis of right knee    Migraine headaches without aura without status migrainosus       Continue postoperative care per orthopedic surgery.  Physical therapy pending.  Hydromorphone when necessary for pain.      Eliquis for DVT prophylaxis     Continue gabapentin, nortriptyline and gabapentin for migraine prophylaxis.  When necessary Stadol Samuel Duane Kreis, MD  02/14/17  4:45 PM    Scheduled Meds Sorted by Name for Roseline Steiner as of 2/13/17 through 2/15/17       1 Day 3 Days 7 Days 10  Days < Today >    Legend:                           Inactive     Active     Other Encounter    Linked               Medications 02/13/17 02/14/17 02/15/17   apixaban (ELIQUIS) tablet 2.5 mg  Dose: 2.5 mg Freq: Every 12 Hours Scheduled Route: PO  Start: 02/15/17 1300    Admin Instructions:   Tablet may be crushed and suspended in 60 mL of water or D5W and immediately delivered via NG tube. Avoid grapefruit juice.      1300     2100            bacitracin 150,000 Units, polymyxin B 1,500,000 Units in sodium chloride (NS) 3,000 mL irrigation  Freq: Once Route: IR  Start: 02/14/17 0745 End: 02/14/17 0812    Admin Instructions:   ** For Irrigation ONLY **     0745     0812             ceFAZolin (ANCEF) 2 g in sodium chloride 0.9 % 100 mL IVPB  Dose: 2 g Freq: Every 8 Hours Route: IV  Indications of Use: SURGICAL PROPHYLAXIS  Start: 02/14/17 1145 End: 02/14/17 2201    Admin Instructions:   Time first dose from pre-op dose.     1408     2131 [C]             ceFAZolin (ANCEF) IVPB (duplex) 2 g  Dose: 2 g Freq: Once Route: IV  Indications of Use: SURGICAL PROPHYLAXIS  Start: 02/14/17 0645 End: 02/14/17 0740    Admin Instructions:   Administer within 1 hour of surgical incision. Redose 4 hours from pre-op dose if procedure ongoing or >1.5 L blood loss.     0740               FLUoxetine (PROzac) capsule 20 mg  Dose: 20 mg Freq: Every 8 Hours Scheduled Route: PO  Start: 02/14/17 1400     1409     2208          0523     1400     2200          gabapentin (NEURONTIN) capsule 300 mg  Dose: 300 mg Freq: Every 12 Hours Scheduled Route: PO  Start: 02/14/17 2100     2038            0830     2100            ketorolac (TORADOL) injection 15 mg  Dose: 15 mg Freq: Once Route: IV  Start: 02/15/17 0800 End: 02/15/17 0829    Admin Instructions:         0829              levothyroxine (SYNTHROID, LEVOTHROID) tablet 100 mcg  Dose: 100 mcg Freq: Every Early Morning Route: PO  Start: 02/15/17 0600    Admin Instructions:   Take on empty stomach.       0523              naproxen (NAPROSYN) tablet 500 mg  Dose: 500 mg Freq: 2 Times Daily With Meals Route: PO  Start: 02/14/17 1800     1748            0830     1800            nortriptyline (PAMELOR) capsule 50 mg  Dose: 50 mg Freq: Nightly Route: PO  Start: 02/14/17 2100     2038            2100              verapamil SR (CALAN-SR) CR tablet 180 mg  Dose: 180 mg Freq: Every 24 Hours Scheduled Route: PO  Start: 02/15/17 0900    Admin Instructions:   Do not crush, split, or chew. Avoid grapefruit juice.      0830              Medications 02/13/17 02/14/17 02/15/17         Continuous Meds Sorted by Name for Roseline Steiner as of 2/13/17 through 2/15/17      Legend:         Inactive     Active     Other Encounter    Linked               Medications 02/13/17 02/14/17 02/15/17   lactated ringers infusion  Rate: 100 mL/hr Freq: Continuous Route: IV  Last Dose: 100 mL/hr (02/15/17 0520)  Start: 02/14/17 1145     1134     2039          0520              lactated ringers infusion  Rate: 125 mL/hr Freq: Continuous Route: IV  Last Dose: 125 mL/hr (02/14/17 1134)  Start: 02/14/17 0730     0704     1134             lactated ringers infusion  Rate: 75 mL/hr Freq: Continuous Route: IV  Last Dose: Stopped (02/14/17 0950)  Start: 02/14/17 0645     0735     0820     0950                 PRN Meds Sorted by Name for Roseline Steiner as of 2/13/17 through 2/15/17      Legend:         Inactive     Active     Other Encounter    Linked               Medications 02/13/17 02/14/17 02/15/17   butorphanol (STADOL) nasal spray 1 spray  Dose: 1 spray Freq: As Needed Route: NA  PRN Reasons: mild pain (1-3),moderate pain (4-6)  PRN Comment: migraine, take if migranol doesn't work  Start: 02/14/17 1102    Admin Instructions:   May repeat 1 spray in nostril after 60 min if 1st spray is inadequate for migraine control  Home Med Narcotic 1 - will require overrride          dexamethasone (DECADRON) injection  Freq: As Needed  Start: 02/14/17 0950 End:  02/14/17 1016     0950     1016-D/C'd         dihydroergotamine (MIGRANAL) nasal spray 1 spray  Dose: 1 spray Freq: As Needed Route: NA  PRN Reason: migraine  Start: 02/14/17 1109    Admin Instructions:   Use on onset of migraine.         docusate sodium (COLACE) capsule 100 mg  Dose: 100 mg Freq: 2 Times Daily PRN Route: PO  PRN Reason: constipation  Start: 02/14/17 1109    Admin Instructions:   Swallow whole. Do not open, crush, or chew capsule.         ePHEDrine injection  Freq: As Needed  Start: 02/14/17 0805 End: 02/14/17 1016     0805     0810     1016-D/C'd       FentaNYL Citrate (PF) (SUBLIMAZE) injection  Freq: As Needed  PRN Reason: severe pain (7-10)  Start: 02/14/17 0721 End: 02/14/17 1016     0721     0800     0820       0830     0835     0850       0855     0910     1016-D/C'd       FentaNYL Citrate (PF) (SUBLIMAZE) injection 50 mcg  Dose: 50 mcg Freq: Every 5 Minutes PRN Route: IV  PRN Reasons: moderate pain (4-6),severe pain (7-10)  Start: 02/14/17 1016 End: 02/14/17 1055    Admin Instructions:   Maximum total dose of fentanyl is 100 mcg.  Use filter needle to withdraw dose from ampule.     1024     1035     1055-D/C'd       FentaNYL Citrate (PF) (SUBLIMAZE) injection 50 mcg  Dose: 50 mcg Freq: Every 5 Minutes PRN Route: IV  PRN Reasons: moderate pain (4-6),severe pain (7-10)  Start: 02/14/17 1016 End: 02/14/17 1055    Admin Instructions:   Maximum total dose of fentanyl is 100 mcg.  Use filter needle to withdraw dose from ampule.     1055-D/C'd           HYDROmorphone (DILAUDID) injection  Freq: As Needed  PRN Reason: severe pain (7-10)  Start: 02/14/17 0910 End: 02/14/17 1016     0910     0930     0940       1009     1016-D/C'd         HYDROmorphone (DILAUDID) injection 0.5 mg  Dose: 0.5 mg Freq: Every 2 Hours PRN Route: IV  PRN Reason: severe pain (7-10)  Start: 02/14/17 1109 End: 02/14/17 1353     1128     1353-D/C'd         And  HYDROmorphone (DILAUDID) injection 1 mg  Dose: 1 mg Freq: Every 2  Hours PRN Route: IV  PRN Reason: severe pain (7-10)  Start: 02/14/17 1400 End: 02/24/17 1059     1409     1638     2038       2301            0115     0323            And  naloxone (NARCAN) injection 0.1 mg  Dose: 0.1 mg Freq: Every 5 Minutes PRN Route: IV  PRN Reason: respiratory depression  Start: 02/14/17 1109 End: 02/14/17 1353    Admin Instructions:   If respiratory rate is less than 8 breaths/minute or patient is difficult to arouse stop any narcotics and contact physician. Administer slow IV push. Repeat as ordered until patient's respiratory rate is greater than 12 breaths/minute.     1353-D/C'd           And  naloxone (NARCAN) injection 0.1 mg  Dose: 0.1 mg Freq: Every 5 Minutes PRN Route: IV  PRN Reason: respiratory depression  Start: 02/14/17 1400    Admin Instructions:   If respiratory rate is less than 8 breaths/minute or patient is difficult to arouse stop any narcotics and contact physician. Administer slow IV push. Repeat as ordered until patient's respiratory rate is greater than 12 breaths/minute.         ipratropium-albuterol (DUO-NEB) nebulizer solution 3 mL  Dose: 3 mL Freq: Once As Needed Route: NEBULIZATION  PRN Reasons: wheezing,shortness of air  PRN Comment: bronchospasm  Start: 02/14/17 1016 End: 02/14/17 1055     1055-D/C'd           ipratropium-albuterol (DUO-NEB) nebulizer solution 3 mL  Dose: 3 mL Freq: Once As Needed Route: NEBULIZATION  PRN Reasons: wheezing,shortness of air  PRN Comment: bronchospasm  Start: 02/14/17 1016 End: 02/14/17 1055     1055-D/C'd           lidocaine (XYLOCAINE) 2% injection  Freq: As Needed  Start: 02/14/17 0738 End: 02/14/17 1016     0738     1016-D/C'd         meperidine (DEMEROL) injection 12.5 mg  Dose: 12.5 mg Freq: Every 5 Minutes PRN Route: IV  PRN Reason: shivering  PRN Comment: May repeat x 1  Start: 02/14/17 1016 End: 02/14/17 1055     1055-D/C'd           meperidine (DEMEROL) injection 12.5 mg  Dose: 12.5 mg Freq: Every 5 Minutes PRN Route:  IV  PRN Reason: shivering  PRN Comment: May repeat x 1  Start: 02/14/17 1016 End: 02/14/17 1055     1055-D/C'd           midazolam (VERSED) injection  Freq: As Needed Route: IV  Start: 02/14/17 0721 End: 02/14/17 1016     0721     0738     1016-D/C'd       ondansetron (ZOFRAN) injection  Freq: As Needed  PRN Reasons: nausea,vomiting  Start: 02/14/17 0950 End: 02/14/17 1016     0950     1016-D/C'd         ondansetron (ZOFRAN) injection 4 mg  Dose: 4 mg Freq: Once As Needed Route: IV  PRN Reasons: nausea,vomiting  PRN Comment: may repeat times one dose  Start: 02/14/17 1016 End: 02/14/17 1055    Admin Instructions:   If BOTH ondansetron (ZOFRAN) and promethazine (PHENERGAN) are ordered use ondansetron first and THEN promethazine IF ondansetron is ineffective.     1055-D/C'd           ondansetron (ZOFRAN) injection 4 mg  Dose: 4 mg Freq: Once As Needed Route: IV  PRN Reasons: nausea,vomiting  PRN Comment: may repeat times one dose  Start: 02/14/17 1016 End: 02/14/17 1055    Admin Instructions:   If BOTH ondansetron (ZOFRAN) and promethazine (PHENERGAN) are ordered use ondansetron first and THEN promethazine IF ondansetron is ineffective.     1055-D/C'd           ondansetron (ZOFRAN) tablet 4 mg  Dose: 4 mg Freq: Every 6 Hours PRN Route: PO  PRN Reasons: nausea,vomiting  Start: 02/14/17 1109    Admin Instructions:   If BOTH ondansetron (ZOFRAN) and promethazine (PHENERGAN) are ordered use ondansetron first and THEN promethazine IF ondansetron is ineffective.     1409     2038          0323              oxyCODONE-acetaminophen (PERCOCET)  MG per tablet 1 tablet  Dose: 1 tablet Freq: Every 4 Hours PRN Route: PO  PRN Reason: severe pain (7-10)  Start: 02/15/17 0714 End: 02/25/17 0713    Admin Instructions:   Do not exceed more than 4 g acetaminophen in 24 hour period      0924              oxyCODONE-acetaminophen (PERCOCET) 5-325 MG per tablet 1 tablet  Dose: 1 tablet Freq: Every 4 Hours PRN Route: PO  PRN Reason:  severe pain (7-10)  Start: 02/14/17 1109 End: 02/15/17 0716    Admin Instructions:   Do not exceed 4g of acetaminophen in a 24 hour period.     1247     1748     2208        0432     0716-D/C'd        phenylephrine (LAURA-SYNEPHRINE) injection  Freq: As Needed  Start: 02/14/17 0814 End: 02/14/17 1016     0814     0828     0930       0940     0950     1016-D/C'd       Propofol (DIPRIVAN) injection  Freq: As Needed Route: IV  Start: 02/14/17 0738 End: 02/14/17 1016     0738     1016-D/C'd         sodium chloride (NS) irrigation solution  Freq: As Needed  Start: 02/14/17 0812 End: 02/14/17 1008     0812 [C]     1008-D/C'd         sodium chloride 0.9 % flush 1-10 mL  Dose: 1-10 mL Freq: As Needed Route: IV  PRN Reason: line care  Start: 02/14/17 0659 End: 02/14/17 1014     1014-D/C'd           sodium chloride 0.9 % flush 1-10 mL  Dose: 1-10 mL Freq: As Needed Route: IV  PRN Reason: line care  Start: 02/14/17 0605 End: 02/14/17 1014     1014-D/C'd           Medications 02/13/17 02/14/17 02/15/17         Nursing Assessments    Expand  Hide           Start       Ordered       02/15/17 1600  Vital Signs Every 8 Hours      02/14/17 1110       02/15/17 1600  Neurovascular Checks Every 8 Hours      02/14/17 1109       02/14/17 1200  Neurovascular Checks Every 4 Hours   Comments: Every 15 minutes x 4, 30 minut...    02/14/17 1109       02/14/17 1110  Pillows may be used to elevate the operative leg, but DO NOT flex the operative knee over a pillow. Until Discontinued      02/14/17 1109       02/14/17 1110  Intake and Output Every Shift      02/14/17 1109       02/14/17 1110  Advance diet as tolerated Until Discontinued      02/14/17 1109       02/14/17 1110  Maintain Sequential Compression Device (Place & Maintain Sequential Compression Device) Continuous      02/14/17 1109       Unscheduled  Ice to incision for 48 hours, then PRN for edema, after activity or exercise, and to lessen discomfort. As Needed      02/14/17 2163        Unscheduled  Apply Ice to Incision PRN for Edema, After Activity or Exercise, and to Lessen Discomfort As Needed      02/14/17 1109       Unscheduled  Change dressing As Needed   Comments: May d/c dressing changes when ...    02/14/17 1109       Unscheduled  Bladder Scan if Patient Unable to Void 4-6 Hours After Catheter Removal (Discontinue Indwelling Urinary Catheter in AM) As Needed      02/14/17 1109       Unscheduled  Straight Cath Every 4-6 Hours As Needed If Patient is Unable to Void After 4-6 Hours, Bladder Scan Volume is Greater Than 350-500mL & Patient Has Symptoms of Bladder Discomfort / Distention (Discontinue Indwelling Urinary Catheter in AM) As Needed      02/14/17 1110       Unscheduled  Consult Pharmacist For Review of Medications That May Cause Urinary Retention - RN To Place Order for Consult it Needed (Discontinue Indwelling Urinary Catheter in AM) As Needed      02/14/17 1109         Nursing Activities and Treatment    Expand  Hide           Start       Ordered       02/15/17 0800  Up in Chair 3x / Day - Beginning POD 1 3 Times Daily      02/14/17 1110       02/15/17 0800  Ambulate in [place] 4x / Day Beginning POD 1 4 Times Daily      02/14/17 1109       02/14/17 1200  Turn cough deep breathe every 2 hour until ambulatory. Every 2 Hours      02/14/17 1110       02/14/17 1110  Weight Bearing - As Tolerated Continuous      02/14/17 1110       02/14/17 1110  Saline lock IV with adequate po intake. Continuous      02/14/17 1110       02/14/17 1110  Catheter Care (Discontinue Indwelling Urinary Catheter in AM) Every Shift      02/14/17 1109       Unscheduled  Up in Chair x 1 day of surgery, then up in chair x 3 beginning POD #1. As Needed      02/14/17 1110       Unscheduled  Schedule / Prompt Voiding For Patients With Urinary Incontinence (Discontinue Indwelling Urinary Catheter in AM) As Needed      02/14/17 1110         Nursing Orders Awaiting Completion    Expand  Hide           Start        Ordered       02/15/17 0651  Oral Care Once    Complete    02/15/17 0651       02/14/17 1110  Ambulate in [place] x1 Day of Surgery Once    Complete    02/14/17 1109       02/14/17 1110  Instruct Patient to Do At Least 10 Ankle Pumps Per Hour While Awake Once    Complete Comments: Instruct Patient to Do At Leas...    02/14/17 1109       02/14/17 1110  May stand to void or use BSC day of surgery. POD #1 and thereafter use bedside commode or bathroom. Once    Complete    02/14/17 1109       02/14/17 1110  Continue Indwelling Urinary Catheter Already in Place (Discontinue Indwelling Urinary Catheter in AM) Once    Complete    02/14/17 1109       02/14/17 1110  Place Sequential Compression Device (Place & Maintain Sequential Compression Device) Once    Complete    02/14/17 1109         Lab Orders   (Through next 24h)    Hide           Start       Ordered       02/16/17 0600  Vitamin B12 Morning Draw      02/15/17 0651       02/16/17 0600  Ferritin Morning Draw      02/15/17 0651       02/16/17 0600  Folate Morning Draw      02/15/17 0651       02/16/17 0600  Iron Profile Morning Draw      02/15/17 0651         Cancel an individual lab collection by clicking the appropriate Discontinue hyperlink. Use the Discontinue link on the right-hand side of the screen associated with the specific lab that was not collected. Do not use the Discontinue link next to the name and frequency of the original order.     Lab Orders (Through next 8h) Comment              Last edited by  on  at        Start       Ordered       Unscheduled  Occult Blood X 1, Stool (Occult Blood X 3, Stool) As Needed        02/15/17 0651         PRN Lab and POCT Orders   (Through next 24h)    Hide           Start       Ordered       Unscheduled  Occult Blood X 1, Stool (Occult Blood X 3, Stool) As Needed (0 of 3 released)    Release    02/15/17 0651         Dietary Orders    Expand  Hide           Start       Ordered       02/14/17 1110  Diet Regular Diet  Effective Now      02/14/17 1109         Consult Orders    Expand  Hide           Start       Ordered       02/14/17 1110  Inpatient Consult to Hospitalist Once    Complete Provider: Samuel Duane Kreis, MD    02/14/17 1109         Respiratory Orders    Expand  Hide           Start       Ordered       02/14/17 1200  Incentive Spirometry Every 2 Hours While Awake   Comments: Until lungs are clear and no p...    02/14/17 1109       02/14/17 1110  Oxygen Therapy- Continuous      02/14/17 1109         Physical Therapy Orders    Expand  Hide           Start       Ordered       02/14/17 1110  PT Consult: Eval & Treat Once    Complete    02/14/17 1109           Admission, Transfer, Discharge Orders  Comment  Collapse  Hide              Last edited by  on  at        Start       Ordered       02/14/17 0959  Inpatient Admission Once       Transfer Service: Surgery         Question Answer Comment     Level of Care Med/Surg      Diagnosis Primary osteoarthritis of right knee      Estimated length of stay? Past midnight tomorrow      Certification I certify that inpatient services are medically necessary for this patient for a duration of greater than two midnights. See H&P and MD Progress Notes for additional information about the patient's course of treatment.            02/14/17 1009           Nurse Snapshot Configuration  Collapse All  Expand All  Hide All  Show All         Code Status Orders           Start       Ordered       02/14/17 1110  Full Code Continuous        02/14/17 1109

## 2017-02-15 NOTE — PROGRESS NOTES
"     LOS: 1 day     Chief Complaint:  Right Knee OA S/P TKA    Subjective     Interval History:     Patient is postop day #1 from total knee arthroplasty.  Patient states that she did not sleep much overnight secondary to ongoing throbbing/aching pain in the right knee despite Dilaudid plus Percocet. She has also been having severe dry mouth. She denies any other complaints at this point.  Blood counts have dropped overnight from 12.2-7.9. She denies any black or tarry stools  She denies any chest pain shortness of breath or nausea or vomiting.  Headaches are well controlled.  Blood pressure stable.  Patient has been evaluated by physical therapy.      Objective     Vital Signs  Visit Vitals   • /56 (BP Location: Right arm, Patient Position: Lying)   • Pulse 83   • Temp 98.5 °F (36.9 °C) (Oral)   • Resp 18   • Ht 67\" (170.2 cm)   • Wt 253 lb (115 kg)  Comment: bed   • SpO2 97%   • Breastfeeding No   • BMI 39.63 kg/m2     Intake & Output (last day)       02/14 0701 - 02/15 0700    P.O. 1050    I.V. (mL/kg) 3590 (31.3)    Total Intake(mL/kg) 4640 (40.4)    Urine (mL/kg/hr) 4075 (1.5)    Other 725 (0.3)    Stool 0 (0)    Blood 200 (0.1)    Total Output 5000    Net -360         Unmeasured Stool Occurrence 0 x            Physical Exam:     General Appearance:    Alert, cooperative, in no acute distress   Head:    Normocephalic, without obvious abnormality, atraumatic   Eyes:            Lids and lashes normal, conjunctivae and sclerae normal, no   icterus, no pallor, corneas clear, PERRLA   Ears:    Ears appear intact with no abnormalities noted   Throat:   No oral lesions, no thrush, oral mucosa moist   Neck:   No adenopathy, supple, trachea midline, no thyromegaly, no   carotid bruit, no JVD   Lungs:     Clear to auscultation,respirations regular, even and                  unlabored    Heart:    Regular rhythm and normal rate, normal S1 and S2, no            murmur, no gallop, no rub, no click   Chest Wall:    No " abnormalities observed   Abdomen:     Normal bowel sounds, no masses, no organomegaly, soft        non-tender, non-distended, no guarding, no rebound                tenderness   Extremities:   RLE in immobilizer, noted RLE edema, no cyanosis, no             redness   Pulses:   Pulses palpable and equal bilaterally   Skin:   No bleeding, bruising or rash   Lymph nodes:   No palpable adenopathy   Neurologic:   Cranial nerves 2 - 12 grossly intact, sensation intact, DTR       present and equal bilaterally        Results Review:    Lab Results   Component Value Date    WBC 8.31 02/15/2017    HGB 7.9 (L) 02/15/2017    HCT 26.3 (L) 02/15/2017    .0 (H) 02/15/2017     02/15/2017       Lab Results   Component Value Date    GLUCOSE 81 02/09/2017    BUN 14 02/09/2017    CREATININE 0.87 02/09/2017    EGFRIFNONA 67 02/09/2017    BCR 16.1 02/09/2017     02/09/2017    K 3.8 02/09/2017     02/09/2017    CO2 32.1 (H) 02/09/2017    CALCIUM 9.4 02/09/2017    ALBUMIN 4.10 06/28/2016    LABIL2 1.5 06/28/2016    AST 15 06/28/2016    ALT 17 06/28/2016     Lab Results   Component Value Date    INR 1.89 10/10/2014    INR 1.54 10/09/2014    INR 1.10 10/08/2014       No results found for: POCGLU       Medication Review:     Current Facility-Administered Medications:   •  apixaban (ELIQUIS) tablet 2.5 mg, 2.5 mg, Oral, Q12H, Wilfred Randle MD  •  butorphanol (STADOL) nasal spray 1 spray, 1 spray, Nasal, PRN, Wilfred Randle MD  •  dihydroergotamine (MIGRANAL) nasal spray 1 spray, 1 spray, Nasal, PRN, Wilfred Randle MD  •  docusate sodium (COLACE) capsule 100 mg, 100 mg, Oral, BID PRN, Wilfred Randle MD  •  FLUoxetine (PROzac) capsule 20 mg, 20 mg, Oral, Q8H, Wilfred Randle MD, 20 mg at 02/15/17 0523  •  gabapentin (NEURONTIN) capsule 300 mg, 300 mg, Oral, Q12H, Wilfred Randle MD, 300 mg at 02/14/17 2038  •  HYDROmorphone (DILAUDID) injection 1 mg, 1 mg, Intravenous, Q2H  PRN, 1 mg at 02/15/17 0323 **AND** naloxone (NARCAN) injection 0.1 mg, 0.1 mg, Intravenous, Q5 Min PRN, Wilfred Randle MD  •  lactated ringers infusion, 75 mL/hr, Intravenous, Continuous, Wilfred Randle MD, Stopped at 02/14/17 0950  •  lactated ringers infusion, 125 mL/hr, Intravenous, Continuous, Brant Flores MD, Last Rate: 125 mL/hr at 02/14/17 1134, 125 mL/hr at 02/14/17 1134  •  lactated ringers infusion, 100 mL/hr, Intravenous, Continuous, Wilfred Randle MD, Last Rate: 100 mL/hr at 02/15/17 0520, 100 mL/hr at 02/15/17 0520  •  levothyroxine (SYNTHROID, LEVOTHROID) tablet 100 mcg, 100 mcg, Oral, Q AM, Wilfred Randle MD, 100 mcg at 02/15/17 0523  •  naproxen (NAPROSYN) tablet 500 mg, 500 mg, Oral, BID With Meals, Wilfred Randle MD, 500 mg at 02/14/17 1748  •  nortriptyline (PAMELOR) capsule 50 mg, 50 mg, Oral, Nightly, Wilfred Randle MD, 50 mg at 02/14/17 2038  •  ondansetron (ZOFRAN) tablet 4 mg, 4 mg, Oral, Q6H PRN, Wilfred Randle MD, 4 mg at 02/15/17 0323  •  oxyCODONE-acetaminophen (PERCOCET) 5-325 MG per tablet 1 tablet, 1 tablet, Oral, Q4H PRN, Wilfred Randle MD, 1 tablet at 02/15/17 0432  •  verapamil SR (CALAN-SR) CR tablet 180 mg, 180 mg, Oral, Q24H, Wilfred Randle MD      Assessment/Plan   Primary osteoarthritis of right knee S/P TKA  Migraine Headaches  Insomnia  Acute Blood Loss Anemia    Dilaudid + percocet prn pain    IVF @125 ml/hr    Add Oral care swabs prn    Continue gabapentin, nortriptyline and gabapentin for migraine prophylaxis.    Eliquis for DVT prophylaxis     Continue with PT    Monitor blood counts, transfuse <7.0    Repeat H&H this AM with Serum iron, ferritin, TIBC, B12, and folate, Retic count, stools x 3    DESTINEY Pablo  02/15/17  6:40 AM     This patient was seen this morning by me.  I agree with the above note.  She has a drop in her hemoglobin is 7.9.  She was not anemic preoperatively.  She's  having significant pain in the right knee.  Hydromorphone and Percocet are not helping much.  Lungs are clear to auscultation bilaterally.  Cardiac exam reveals regular rate and rhythm.  Increase Percocet to Percocet 10 every 4 hours when necessary for pain.  Toradol 15 mg IV x 1 dose.    Samuel Duane Kreis, MD  02/15/17  7:16 AM

## 2017-02-15 NOTE — PLAN OF CARE
Problem: Patient Care Overview (Adult)  Goal: Plan of Care Review  Outcome: Ongoing (interventions implemented as appropriate)    Problem: Perioperative Period (Adult)  Goal: Signs and Symptoms of Listed Potential Problems Will be Absent or Manageable (Perioperative Period)  Outcome: Ongoing (interventions implemented as appropriate)    Problem: Knee Replacement, Total (Adult)  Goal: Signs and Symptoms of Listed Potential Problems Will be Absent or Manageable (Knee Replacement, Total)  Outcome: Ongoing (interventions implemented as appropriate)    Problem: Fall Risk (Adult)  Goal: Identify Related Risk Factors and Signs and Symptoms  Outcome: Ongoing (interventions implemented as appropriate)  Goal: Absence of Falls  Outcome: Ongoing (interventions implemented as appropriate)    Problem: Pain, Acute (Adult)  Goal: Identify Related Risk Factors and Signs and Symptoms  Outcome: Ongoing (interventions implemented as appropriate)  Goal: Acceptable Pain Control/Comfort Level  Outcome: Ongoing (interventions implemented as appropriate)

## 2017-02-15 NOTE — PROGRESS NOTES
Inpatient Progress Note  Roseline Steiner  Date: 02/15/17  MRN: [id number]      Subjective:  Roseline Steiner is a 56 y.o. POD#1 right TKA. She is complaining of moderate pain especially with movement and when transferring.  She denies paresthesias. She has no new complaints.      Objective:    Vitals:    02/14/17 1837 02/14/17 2330 02/15/17 0411 02/15/17 0630   BP: 110/59 122/55 116/63 100/56   BP Location: Right arm Right arm Right arm Right arm   Patient Position: Lying Lying Lying Lying   Pulse: 101 89 94 83   Resp: 18 18 18 18   Temp: 98.3 °F (36.8 °C) 97.9 °F (36.6 °C) 98.3 °F (36.8 °C) 98.5 °F (36.9 °C)   TempSrc: Oral Oral Oral Oral   SpO2: 97% 98% 97% 97%   Weight:       Height:         Examination of the right knee reveals aquacel dressing intact with mild post op drainage. She has mild swelling right knee. No calf tenderness. Active dorsiflexion and plantarflexion. Neurovascular status is intact.     Labs:      Results from last 7 days  Lab Units 02/15/17  0729 02/15/17  0057 02/09/17  1037   WBC 10*3/mm3  --  8.31 5.66   HEMOGLOBIN g/dL 8.3* 7.9* 12.2   HEMATOCRIT % 25.9* 26.3* 39.8   MCV fL  --  100.0* 96.8*   MCHC g/dL  --  30.0* 30.7*   PLATELETS 10*3/mm3  --  205 239           Results from last 7 days  Lab Units 02/09/17  1037   SODIUM mmol/L 142   POTASSIUM mmol/L 3.8   CHLORIDE mmol/L 106   TOTAL CO2 mmol/L 32.1*   BUN mg/dL 14   CREATININE mg/dL 0.87   EGFR IF NONAFRICN AM mL/min/1.73 67   CALCIUM mg/dL 9.4   GLUCOSE mg/dL 81   Estimated Creatinine Clearance: 92.4 mL/min (by C-G formula based on Cr of 0.87).  No results found for: AMMONIA          No results found for: HGBA1C  No results found for: TSH, FREET4      Pain Management Panel     Pain Management Panel Latest Ref Rng 9/12/2015    AMPHETAMINES SCREEN, URINE Negative Negative    BARBITURATES SCREEN Negative Negative    BENZODIAZEPINE SCREEN, URINE Negative Negative    COCAINE SCREEN, URINE Negative Negative    METHADONE SCREEN, URINE Negative  Negative              Radiology:  Imaging Results (last 72 hours)     Procedure Component Value Units Date/Time    XR Knee 1 or 2 View Right [[id number]] Collected:  02/14/17 1050     Updated:  02/14/17 1052    Narrative:       EXAMINATION: XR KNEE 1 OR 2 VW RIGHT-      XR KNEE 1 OR 2 VW RIGHT-      INDICATION: POST SURG subsequent     COMPARISON: 1/18/2017     FINDINGS: Total right knee arthroplasty. Alignment maintained. Soft  tissue postsurgical change.          Impression:       As above.              This report was finalized on 2/14/2017 10:50 AM by Dr. Chris Ring MD.               Assessment:      ICD-10-CM ICD-9-CM   1. Primary osteoarthritis of right knee M17.11 715.16   2. Preop testing Z01.818 V72.84       Plan:  POD#1 Right TKA. Patient is stable. She has moderate pain. Continue knee immobilizer as tolerated. Begin PT/OT per protocol today. Will watch H&H. SS to initiate discharge planning. Continue Eliquis for DVT prophylaxis.       DESTINEY Uribe

## 2017-02-15 NOTE — PLAN OF CARE
Problem: Patient Care Overview (Adult)  Goal: Plan of Care Review  Outcome: Ongoing (interventions implemented as appropriate)  Goal: Adult Individualization and Mutuality  Outcome: Ongoing (interventions implemented as appropriate)  Goal: Discharge Needs Assessment  Outcome: Ongoing (interventions implemented as appropriate)    Problem: Perioperative Period (Adult)  Goal: Signs and Symptoms of Listed Potential Problems Will be Absent or Manageable (Perioperative Period)  Outcome: Ongoing (interventions implemented as appropriate)    Problem: Knee Replacement, Total (Adult)  Goal: Signs and Symptoms of Listed Potential Problems Will be Absent or Manageable (Knee Replacement, Total)  Outcome: Ongoing (interventions implemented as appropriate)    Problem: Fall Risk (Adult)  Goal: Identify Related Risk Factors and Signs and Symptoms  Outcome: Ongoing (interventions implemented as appropriate)  Goal: Absence of Falls  Outcome: Ongoing (interventions implemented as appropriate)    Problem: Pain, Acute (Adult)  Goal: Identify Related Risk Factors and Signs and Symptoms  Outcome: Ongoing (interventions implemented as appropriate)  Goal: Acceptable Pain Control/Comfort Level  Outcome: Ongoing (interventions implemented as appropriate)

## 2017-02-15 NOTE — PROGRESS NOTES
Discharge Planning Assessment   Deep     Patient Name: Roseline Steiner  MRN: [id number]  Today's Date: 2/15/2017    Admit Date: 2/14/2017       Discharge Plan       02/15/17 1428    Case Management/Social Work Plan    Plan SS spoke with pt on this date. Pt states that she does not have home health at this time. Pt will need home health at discharge. Pt has utilized [facility]. HH to be arranged with MD order. SS will follow and assist as needed.     Patient/Family In Agreement With Plan yes          Corinna Nichols

## 2017-02-15 NOTE — PROGRESS NOTES
Acute Care - Physical Therapy Treatment Note   [place]     Patient Name: Roseline Steiner  : 1960  MRN: [id number]  Today's Date: 2/15/2017  Onset of Illness/Injury or Date of Surgery Date: 17  Date of Referral to PT: 17  Referring Physician: Dr. Randle    Admit Date: 2017    Visit Dx:    ICD-10-CM ICD-9-CM   1. Primary osteoarthritis of right knee M17.11 715.16   2. Preop testing Z01.818 V72.84     Patient Active Problem List   Diagnosis   • Migraine with status migrainosus   • Arthralgia of left hip   • Greater trochanteric bursitis   • Primary osteoarthritis of right knee               Adult Rehabilitation Note       02/15/17 1547          Rehab Assessment/Intervention    Discipline physical therapist  -BC      Document Type therapy note (daily note)  -BC      Subjective Information agree to therapy;complains of;weakness  -BC      Patient Effort, Rehab Treatment good  -BC      Recorded by [BC] Mackenzie Foster, PT      Pain Assessment    Pain Assessment Hernandez-Baker FACES  -BC      Hernandez-Baker FACES Pain Rating 4  -BC      Pain Location Knee  -BC      Recorded by [BC] Mackenzie Foster, PT      Cognitive Assessment/Intervention    Current Cognitive/Communication Assessment functional  -BC      Orientation Status oriented x 4  -BC      Follows Commands/Answers Questions 100% of the time;able to follow single-step instructions  -BC      Personal Safety WNL/WFL;decreased awareness, need for safety  -BC      Personal Safety Interventions gait belt;nonskid shoes/slippers when out of bed  -BC      Recorded by [BC] Mackenzie Foster, PT      Bed Mobility, Assessment/Treatment    Bed Mobility, Assistive Device bed rails  -BC      Bed Mob, Supine to Sit, [place] verbal cues required;nonverbal cues required (demo/gesture);moderate assist (50% patient effort);2 person assist required  -BC      Bed Mob, Sit to Supine, [place] verbal cues required;nonverbal cues required (demo/gesture);moderate  assist (50% patient effort);2 person assist required  -BC      Recorded by [BC] Mackenzie Foster, PT      Transfer Assessment/Treatment    Transfers, Sit-Stand [place] verbal cues required;nonverbal cues required (demo/gesture);minimum assist (75% patient effort);moderate assist (50% patient effort);2 person assist required  -BC      Transfers, Stand-Sit [place] verbal cues required;nonverbal cues required (demo/gesture);moderate assist (50% patient effort);minimum assist (75% patient effort);2 person assist required  -BC      Transfers, Sit-Stand-Sit, Assist Device standard walker  -BC      Recorded by [BC] Mackenzie Foster, PT      Gait Assessment/Treatment    Gait, [place] Level minimum assist (75% patient effort);2 person assist required;verbal cues required;nonverbal cues required (demo/gesture)  -BC      Gait, Assistive Device rolling walker  -BC      Gait, Distance (Feet) 80   75 pm  -BC      Recorded by [BC] Mackenzie Foster, PT      Positioning and Restraints    Pre-Treatment Position in bed  -BC      Post Treatment Position chair  -BC      In Bed notified nsg;supine;call light within reach;encouraged to call for assist;with family/caregiver;side rails up x3   pm  -BC      In Chair notified nsg;sitting;call light within reach;encouraged to call for assist   am  -BC      Recorded by [BC] Mackenzie Foster PT        User Key  (r) = Recorded By, (t) = Taken By, (c) = Cosigned By    Initials Name Effective Dates    BC Mackenzie Foster, PT 03/14/16 -                 IP PT Goals       02/14/17 1816          Bed Mobility PT LTG    Bed Mobility PT LTG, Date Established 02/14/17  -BC      Bed Mobility PT LTG, Time to Achieve 2 - 3 days  -BC      Bed Mobility PT LTG, Activity Type all bed mobility  -BC      Bed Mobility PT LTG, [place] Level contact guard assist  -BC      Bed Mobility PT Goal  LTG, Assist Device bed rails  -BC      Transfer Training PT LTG    Transfer Training PT LTG, Time to  Achieve 2 - 3 days  -BC      Transfer Training PT LTG, Activity Type all transfers  -BC      Transfer Training PT LTG, [place] Level contact guard assist  -BC      Transfer Training PT LTG, Assist Device walker, rolling  -BC      Gait Training PT LTG    Gait Training Goal PT LTG, Date Established 02/14/17  -BC      Gait Training Goal PT LTG, Time to Achieve 2 - 3 days  -BC      Gait Training Goal PT LTG, [place] Level contact guard assist  -BC      Gait Training Goal PT LTG, Assist Device walker, rolling  -BC      Gait Training Goal PT LTG, Distance to Achieve 80  -BC        User Key  (r) = Recorded By, (t) = Taken By, (c) = Cosigned By    Initials Name Provider Type    BC Mackenzie Foster PT Physical Therapist          Physical Therapy Education     Title: PT OT SLP Therapies (Active)     Topic: Physical Therapy (Active)     Point: Mobility training (Active)    Learning Progress Summary    Learner Readiness Method Response Comment Documented by Status   Patient Acceptance E NR  BC 02/15/17 1612 Active    Acceptance E NR  BC 02/14/17 1816 Active               Point: Home exercise program (Active)    Learning Progress Summary    Learner Readiness Method Response Comment Documented by Status   Patient Acceptance E NR  BC 02/15/17 1612 Active    Acceptance E NR  BC 02/14/17 1816 Active               Point: Body mechanics (Active)    Learning Progress Summary    Learner Readiness Method Response Comment Documented by Status   Patient Acceptance E NR  BC 02/15/17 1612 Active    Acceptance E NR  BC 02/14/17 1816 Active               Point: Precautions (Active)    Learning Progress Summary    Learner Readiness Method Response Comment Documented by Status   Patient Acceptance E NR  BC 02/15/17 1612 Active    Acceptance E NR  BC 02/14/17 1816 Active                      User Key     Initials Effective Dates Name Provider Type [facility] 03/14/16 -  Mackenzie Foster PT Physical Therapist PT                     PT Recommendation and Plan  Planned Therapy Interventions: balance training, bed mobility training, gait training, home exercise program, patient/family education, strengthening, transfer training  PT Frequency: 2 times/day, 5 times/wk, per priority policy             Outcome Measures       02/15/17 1600 02/14/17 1800       How much help from another person do you currently need...    Turning from your back to your side while in flat bed without using bedrails? 3  -BC 2  -BC     Moving from lying on back to sitting on the side of a flat bed without bedrails? 2  -BC 2  -BC     Moving to and from a bed to a chair (including a wheelchair)? 3  -BC 2  -BC     Standing up from a chair using your arms (e.g., wheelchair, bedside chair)? 3  -BC 2  -BC     Climbing 3-5 steps with a railing? 2  -BC 1  -BC     To walk in hospital room? 3  -BC 2  -BC     AM-PAC 6 Clicks Score 16  -BC 11  -BC     Functional Assessment    Outcome Measure Options AM-PAC 6 Clicks Basic Mobility (PT)  -BC AM-PAC 6 Clicks Basic Mobility (PT)  -BC       User Key  (r) = Recorded By, (t) = Taken By, (c) = Cosigned By    Initials Name Provider Type    BC Mackeznie Foster PT Physical Therapist           Time Calculation:         PT Charges       02/15/17 1613          Time Calculation    Start Time --   60  -BC      PT Received On 02/15/17  -BC        User Key  (r) = Recorded By, (t) = Taken By, (c) = Cosigned By    Initials Name Provider Type    BC Mackenzie Foster PT Physical Therapist          Therapy Charges for Today     Code Description Service Date Service Provider Modifiers Qty    97538887007 HC PT MOBILITY CURRENT 2/14/2017 Mackenzie Foster, PT GP, CL 1    53071473147 HC PT MOBILITY PROJECTED 2/14/2017 Mackenzie Foster, PT GP, CK 1    86345682902 HC GAIT TRAINING EA 15 MIN 2/14/2017 Mackenzie Foster, PT GP 1    75756369056 HC PT EVAL MOD COMPLEXITY 2 2/14/2017 Mackenzie Foster, PT GP 1    80149973996 HC PT THERAPEUTIC ACT EA 15 MIN  2/14/2017 Mackenzie Foster, PT GP 1    51748755257 HC PT THER SUPP EA 15 MIN 2/14/2017 Mackenzie Foster, PT GP 4    18190696545 HC GAIT TRAINING EA 15 MIN 2/15/2017 Mackenzie Foster, PT GP 2    47856426052 HC PT THERAPEUTIC ACT EA 15 MIN 2/15/2017 Mackenzie Foster, PT GP 2    54341138609 HC PT THER SUPP EA 15 MIN 2/15/2017 Mackenzie Foster, PT GP 4          PT G-Codes  Outcome Measure Options: AM-PAC 6 Clicks Basic Mobility (PT)  Score: 11  Functional Limitation: Mobility: Walking and moving around  Mobility: Walking and Moving Around Current Status (): At least 60 percent but less than 80 percent impaired, limited or restricted  Mobility: Walking and Moving Around Goal Status (): At least 40 percent but less than 60 percent impaired, limited or restricted    Mackenzie Foster, PT  2/15/2017

## 2017-02-16 LAB
FERRITIN SERPL-MCNC: 68 NG/ML (ref 10–290.3)
FOLATE SERPL-MCNC: 6.44 NG/ML (ref 5.4–20)
IRON 24H UR-MRATE: 18 MCG/DL (ref 49–151)
IRON SATN MFR SERPL: 8 % (ref 15–50)
TIBC SERPL-MCNC: 229 MCG/DL (ref 241–421)
VIT B12 BLD-MCNC: 255 PG/ML (ref 211–911)

## 2017-02-16 PROCEDURE — 82607 VITAMIN B-12: CPT | Performed by: PHYSICIAN ASSISTANT

## 2017-02-16 PROCEDURE — 82728 ASSAY OF FERRITIN: CPT | Performed by: PHYSICIAN ASSISTANT

## 2017-02-16 PROCEDURE — 25010000002 KETOROLAC TROMETHAMINE PER 15 MG: Performed by: ORTHOPAEDIC SURGERY

## 2017-02-16 PROCEDURE — 83540 ASSAY OF IRON: CPT | Performed by: PHYSICIAN ASSISTANT

## 2017-02-16 PROCEDURE — 97116 GAIT TRAINING THERAPY: CPT

## 2017-02-16 PROCEDURE — 25010000002 MORPHINE PER 10 MG: Performed by: ORTHOPAEDIC SURGERY

## 2017-02-16 PROCEDURE — 83550 IRON BINDING TEST: CPT | Performed by: PHYSICIAN ASSISTANT

## 2017-02-16 PROCEDURE — 82746 ASSAY OF FOLIC ACID SERUM: CPT | Performed by: PHYSICIAN ASSISTANT

## 2017-02-16 PROCEDURE — 99024 POSTOP FOLLOW-UP VISIT: CPT | Performed by: PHYSICIAN ASSISTANT

## 2017-02-16 PROCEDURE — 97530 THERAPEUTIC ACTIVITIES: CPT

## 2017-02-16 RX ORDER — OXYCODONE AND ACETAMINOPHEN 10; 325 MG/1; MG/1
1 TABLET ORAL EVERY 4 HOURS PRN
Qty: 40 TABLET | Refills: 0 | Status: SHIPPED | OUTPATIENT
Start: 2017-02-16 | End: 2018-06-15 | Stop reason: ALTCHOICE

## 2017-02-16 RX ORDER — LANOLIN ALCOHOL/MO/W.PET/CERES
1000 CREAM (GRAM) TOPICAL DAILY
Status: DISCONTINUED | OUTPATIENT
Start: 2017-02-16 | End: 2017-02-18 | Stop reason: HOSPADM

## 2017-02-16 RX ORDER — CYANOCOBALAMIN 1000 UG/ML
1000 INJECTION, SOLUTION INTRAMUSCULAR; SUBCUTANEOUS
Status: DISCONTINUED | OUTPATIENT
Start: 2017-02-16 | End: 2017-02-16

## 2017-02-16 RX ORDER — FERROUS SULFATE 325(65) MG
325 TABLET ORAL 2 TIMES DAILY WITH MEALS
Status: DISCONTINUED | OUTPATIENT
Start: 2017-02-16 | End: 2017-02-18 | Stop reason: HOSPADM

## 2017-02-16 RX ADMIN — FERROUS SULFATE TAB 325 MG (65 MG ELEMENTAL FE) 325 MG: 325 (65 FE) TAB at 18:03

## 2017-02-16 RX ADMIN — MORPHINE SULFATE 8 MG: 10 INJECTION, SOLUTION INTRAMUSCULAR; INTRAVENOUS at 04:23

## 2017-02-16 RX ADMIN — NAPROXEN 500 MG: 250 TABLET ORAL at 18:03

## 2017-02-16 RX ADMIN — Medication 1000 MCG: at 09:58

## 2017-02-16 RX ADMIN — VERAPAMIL HYDROCHLORIDE 180 MG: 180 TABLET, FILM COATED, EXTENDED RELEASE ORAL at 08:12

## 2017-02-16 RX ADMIN — GABAPENTIN 300 MG: 300 CAPSULE ORAL at 21:25

## 2017-02-16 RX ADMIN — NAPROXEN 500 MG: 250 TABLET ORAL at 08:12

## 2017-02-16 RX ADMIN — MORPHINE SULFATE 8 MG: 10 INJECTION, SOLUTION INTRAMUSCULAR; INTRAVENOUS at 09:57

## 2017-02-16 RX ADMIN — FLUOXETINE 20 MG: 20 CAPSULE ORAL at 21:25

## 2017-02-16 RX ADMIN — GABAPENTIN 300 MG: 300 CAPSULE ORAL at 08:12

## 2017-02-16 RX ADMIN — FERROUS SULFATE TAB 325 MG (65 MG ELEMENTAL FE) 325 MG: 325 (65 FE) TAB at 09:56

## 2017-02-16 RX ADMIN — OXYCODONE HYDROCHLORIDE AND ACETAMINOPHEN 1 TABLET: 10; 325 TABLET ORAL at 14:48

## 2017-02-16 RX ADMIN — MORPHINE SULFATE 8 MG: 10 INJECTION, SOLUTION INTRAMUSCULAR; INTRAVENOUS at 16:39

## 2017-02-16 RX ADMIN — APIXABAN 2.5 MG: 2.5 TABLET, FILM COATED ORAL at 08:12

## 2017-02-16 RX ADMIN — FLUOXETINE 20 MG: 20 CAPSULE ORAL at 06:00

## 2017-02-16 RX ADMIN — FLUOXETINE 20 MG: 20 CAPSULE ORAL at 14:48

## 2017-02-16 RX ADMIN — KETOROLAC TROMETHAMINE 30 MG: 30 INJECTION, SOLUTION INTRAMUSCULAR; INTRAVENOUS at 22:28

## 2017-02-16 RX ADMIN — MORPHINE SULFATE 8 MG: 10 INJECTION, SOLUTION INTRAMUSCULAR; INTRAVENOUS at 00:48

## 2017-02-16 RX ADMIN — APIXABAN 2.5 MG: 2.5 TABLET, FILM COATED ORAL at 21:25

## 2017-02-16 RX ADMIN — OXYCODONE HYDROCHLORIDE AND ACETAMINOPHEN 1 TABLET: 10; 325 TABLET ORAL at 06:37

## 2017-02-16 RX ADMIN — ONDANSETRON HYDROCHLORIDE 4 MG: 4 TABLET, FILM COATED ORAL at 06:36

## 2017-02-16 RX ADMIN — LEVOTHYROXINE SODIUM 100 MCG: 100 TABLET ORAL at 06:00

## 2017-02-16 NOTE — PAYOR COMM NOTE
"[facility]  npi [id number]    Utilization Review   Contact:Anjali Santana RN, BSN  Phone: [phone]  Fax: [phone]    [place]/attn: nurse review  Ref# [id number]  Cont stay update    Roseline Guerrero (56 y.o. Female)     Date of Birth Social Security Number Address Home Phone MRN    1960  PO   [facility] [address] [phone] [id number]    Islam Marital Status          Zoroastrianism        Admission Date Admission Type Admitting Provider Attending Provider Department, Room/Bed    2/14/17 Elective Wilfred Randle MD Belhasen, Ronald Keith, MD [address], [address]/1S    Discharge Date Discharge Disposition Discharge Destination                      Attending Provider: Wilfred Randle MD     Allergies:  Codeine    Isolation:  None   Infection:  None   Code Status:  FULL    Ht:  67\" (170.2 cm)   Wt:  253 lb (115 kg)    Admission Cmt:  None   Principal Problem:  None                Active Insurance as of 2/14/2017     Primary Coverage     Payor Plan Insurance Group Employer/Plan Group    [employer] EMPLOYEE [id number]     Payor Plan Address Payor Plan Phone Number Effective From Effective To    [address] [phone] 1/1/2015     [place] [address]       Subscriber Name Subscriber Birth Date Member ID       ROSELINE GUERRERO 1960 [id number]                 Emergency Contacts      (Rel.) Home Phone Work Phone Mobile Phone    Gely Ko (Sister) [phone] -- --    Wilfred Guerrero (Spouse) [phone] -- --        Corinna Nichols  Signed  Progress Notes Date of Service: 2/15/2017  2:29 PM         Hide copied text  Cate for attribution information  Discharge Planning Assessment  [facility]     Patient Name: Roseline Guerrero  MRN: [id number]  Today's Date: 2/15/2017    Admit Date: 2/14/2017         Discharge Plan        02/15/17 1428          Case Management/Social Work Plan     Plan " SS spoke with pt on this date. Pt states that she does not have home health at this time. Pt will need home health at discharge. Pt has utilized [facility]. HH to be arranged with MD order. SS will follow and assist as needed.      Patient/Family In Agreement With Plan yes         Corinna Nichols                        Mackenzie Foster, PT Physical Therapist Signed Physical Therapy Progress Notes Date of Service: 2/15/2017  4:14 PM         Hide copied text  Hover for attribution information  Acute Care - Physical Therapy Treatment Note   Deep     Patient Name: Roseline Steiner  : 1960  MRN: [id number]  Today's Date: 2/15/2017  Onset of Illness/Injury or Date of Surgery Date: 17  Date of Referral to PT: 17  Referring Physician: Dr. Randle    Admit Date: 2017     Visit Dx:      ICD-10-CM ICD-9-CM   1. Primary osteoarthritis of right knee M17.11 715.16   2. Preop testing Z01.818 V72.84          Patient Active Problem List   Diagnosis   • Migraine with status migrainosus   • Arthralgia of left hip   • Greater trochanteric bursitis   • Primary osteoarthritis of right knee                    Adult Rehabilitation Note        02/15/17 1547                Rehab Assessment/Intervention     Discipline physical therapist  -BC         Document Type therapy note (daily note)  -BC         Subjective Information agree to therapy;complains of;weakness  -BC         Patient Effort, Rehab Treatment good  -BC         Recorded by [BC] Mackenzie Foster, PT         Pain Assessment     Pain Assessment Hernandez-Sarabia FACES  -BC         Hernandez-Baker FACES Pain Rating 4  -BC         Pain Location Knee  -BC         Recorded by [BC] Mackenzie Foster, PT         Cognitive Assessment/Intervention     Current Cognitive/Communication Assessment functional  -BC         Orientation Status oriented x 4  -BC         Follows Commands/Answers Questions 100% of the time;able to follow single-step instructions  -BC          Personal Safety WNL/WFL;decreased awareness, need for safety  -BC         Personal Safety Interventions gait belt;nonskid shoes/slippers when out of bed  -BC         Recorded by [BC] Mackenzie Foster PT         Bed Mobility, Assessment/Treatment     Bed Mobility, Assistive Device bed rails  -BC         Bed Mob, Supine to Sit, [place] verbal cues required;nonverbal cues required (demo/gesture);moderate assist (50% patient effort);2 person assist required  -BC         Bed Mob, Sit to Supine, [place] verbal cues required;nonverbal cues required (demo/gesture);moderate assist (50% patient effort);2 person assist required  -BC         Recorded by [BC] Mackenzie Foster PT         Transfer Assessment/Treatment     Transfers, Sit-Stand [place] verbal cues required;nonverbal cues required (demo/gesture);minimum assist (75% patient effort);moderate assist (50% patient effort);2 person assist required  -BC         Transfers, Stand-Sit [place] verbal cues required;nonverbal cues required (demo/gesture);moderate assist (50% patient effort);minimum assist (75% patient effort);2 person assist required  -BC         Transfers, Sit-Stand-Sit, Assist Device standard walker  -BC         Recorded by [BC] Mackenzie Foster PT         Gait Assessment/Treatment     Gait, [place] Level minimum assist (75% patient effort);2 person assist required;verbal cues required;nonverbal cues required (demo/gesture)  -BC         Gait, Assistive Device rolling walker  -BC         Gait, Distance (Feet) 80   75 pm  -BC         Recorded by [BC] Mackenzie Foster PT         Positioning and Restraints     Pre-Treatment Position in bed  -BC         Post Treatment Position chair  -BC         In Bed notified nsg;supine;call light within reach;encouraged to call for assist;with family/caregiver;side rails up x3   pm  -BC         In Chair notified nsg;sitting;call light within reach;encouraged to call for assist   am  -BC          Recorded by [BC] Mackenzie Fostre PT             User Key  (r) = Recorded By, (t) = Taken By, (c) = Cosigned By    Initials Name Effective Dates     BC Mackenzie Foster, PT 03/14/16 -                     IP PT Goals        02/14/17 1816                Bed Mobility PT LTG     Bed Mobility PT LTG, Date Established 02/14/17  -BC         Bed Mobility PT LTG, Time to Achieve 2 - 3 days  -BC         Bed Mobility PT LTG, Activity Type all bed mobility  -BC         Bed Mobility PT LTG, [place] Level contact guard assist  -BC         Bed Mobility PT Goal LTG, Assist Device bed rails  -BC         Transfer Training PT LTG     Transfer Training PT LTG, Time to Achieve 2 - 3 days  -BC         Transfer Training PT LTG, Activity Type all transfers  -BC         Transfer Training PT LTG, [place] Level contact guard assist  -BC         Transfer Training PT LTG, Assist Device walker, rolling  -BC         Gait Training PT LTG     Gait Training Goal PT LTG, Date Established 02/14/17  -BC         Gait Training Goal PT LTG, Time to Achieve 2 - 3 days  -BC         Gait Training Goal PT LTG, [place] Level contact guard assist  -BC         Gait Training Goal PT LTG, Assist Device walker, rolling  -BC         Gait Training Goal PT LTG, Distance to Achieve 80  -BC              User Key  (r) = Recorded By, (t) = Taken By, (c) = Cosigned By    Initials Name Provider Type     GINA Foster PT Physical Therapist                Physical Therapy Education           Title: PT OT SLP Therapies (Active)                Topic: Physical Therapy (Active)           Point: Mobility training (Active)      Learning Progress Summary      Learner Readiness Method Response Comment Documented by Status   Patient Acceptance E NR   BC 02/15/17 1612 Active      Acceptance E NR   BC 02/14/17 1816 Active                       Point: Home exercise program (Active)      Learning Progress Summary               Learner Readiness Method Response  Comment Documented by Status             Patient Acceptance E NR   BC 02/15/17 1612 Active      Acceptance E NR   BC 02/14/17 1816 Active                       Point: Body mechanics (Active)      Learning Progress Summary               Learner Readiness Method Response Comment Documented by Status             Patient Acceptance E NR   BC 02/15/17 1612 Active      Acceptance E NR   BC 02/14/17 1816 Active                       Point: Precautions (Active)      Learning Progress Summary               Learner Readiness Method Response Comment Documented by Status             Patient Acceptance E NR   BC 02/15/17 1612 Active      Acceptance E NR   BC 02/14/17 1816 Active                                         User Key               Initials Effective Dates Name Provider Type Discipline               BC 03/14/16 -  Mackenzie Foster, PT Physical Therapist PT                            PT Recommendation and Plan  Planned Therapy Interventions: balance training, bed mobility training, gait training, home exercise program, patient/family education, strengthening, transfer training  PT Frequency: 2 times/day, 5 times/wk, per priority policy                Outcome Measures        02/15/17 1600 02/14/17 1800              How much help from another person do you currently need...     Turning from your back to your side while in flat bed without using bedrails? 3  -BC 2  -BC       Moving from lying on back to sitting on the side of a flat bed without bedrails? 2  -BC 2  -BC       Moving to and from a bed to a chair (including a wheelchair)? 3  -BC 2  -BC       Standing up from a chair using your arms (e.g., wheelchair, bedside chair)? 3  -BC 2  -BC       Climbing 3-5 steps with a railing? 2  -BC 1  -BC       To walk in hospital room? 3  -BC 2  -BC       AM-PAC 6 Clicks Score 16  -BC 11  -BC       Functional Assessment     Outcome Measure Options AM-PAC 6 Clicks Basic Mobility (PT)  -BC AM-PAC 6 Clicks Basic Mobility (PT)  -BC             User Key  (r) = Recorded By, (t) = Taken By, (c) = Cosigned By    Initials Name Provider Type     BC Makcenzie Foster, PT Physical Therapist            Time Calculation:           PT Charges        02/15/17 1613                Time Calculation     Start Time --   60  -BC         PT Received On 02/15/17  -BC              User Key  (r) = Recorded By, (t) = Taken By, (c) = Cosigned By    Initials Name Provider Type     BC Mackenzie Foster, PT Physical Therapist            Therapy Charges for Today     Code Description Service Date Service Provider Modifiers Qty     47297880676 HC PT MOBILITY CURRENT 2/14/2017 Mackenzie Foster, PT GP, CL 1     30939135549 HC PT MOBILITY PROJECTED 2/14/2017 Mackenzie Foster, PT GP, CK 1     36744258450 HC GAIT TRAINING EA 15 MIN 2/14/2017 Mackenzie Foster, PT GP 1     70017474313 HC PT EVAL MOD COMPLEXITY 2 2/14/2017 Mackenzie Foster, PT GP 1     11890677782 HC PT THERAPEUTIC ACT EA 15 MIN 2/14/2017 Mackenzie Foster, PT GP 1     41028359055 HC PT THER SUPP EA 15 MIN 2/14/2017 Mackenzie Foster, PT GP 4     16075048842 HC GAIT TRAINING EA 15 MIN 2/15/2017 Mackenzie Foster, PT GP 2     90518163959 HC PT THERAPEUTIC ACT EA 15 MIN 2/15/2017 Mackenzie Foster, PT GP 2     20773693008 HC PT THER SUPP EA 15 MIN 2/15/2017 Mackenzie Foster, PT GP 4            PT G-Codes  Outcome Measure Options: AM-PAC 6 Clicks Basic Mobility (PT)  Score: 11  Functional Limitation: Mobility: Walking and moving around  Mobility: Walking and Moving Around Current Status (): At least 60 percent but less than 80 percent impaired, limited or restricted  Mobility: Walking and Moving Around Goal Status (): At least 40 percent but less than 60 percent impaired, limited or restricted     Mackenzie Foster PT  2/15/2017                           Samuel Duane Kreis, MD Physician Signed Medicine Progress Notes Date of Service: 2/16/2017  7:51 AM      Expand All Collapse All    Hide copied text         LOS: 2  "days      Chief Complaint: Right Knee OA S/P TKA     Subjective        Interval History:      Patient is postop day #2 from total knee arthroplasty.  Patient overall is doing some better from a pain control aspect. She was noted B12 and iron deficient. She has been per to stating with physical therapy. Unfortunately did take a fall this morning however does not have any acute injuries at this point that she is endorsing. She denies any chest pain or shortness of breath. Repeat H&H from yesterday was stable at 8.3.     Objective        Vital Signs       Visit Vitals   • /74 (BP Location: Right arm, Patient Position: Lying)   • Pulse 81   • Temp 97.9 °F (36.6 °C)   • Resp 18   • Ht 67\" (170.2 cm)   • Wt 253 lb (115 kg)  Comment: bed   • SpO2 96%   • Breastfeeding No   • BMI 39.63 kg/m2      Intake & Output (last day)        02/15 0701 - 02/16 0700 02/16 0701 - 02/17 0700     P.O. 2080       I.V. (mL/kg) 1965 (17.1)       Total Intake(mL/kg) 4045 (35.2)       Urine (mL/kg/hr) 0 (0)       Other 325 (0.1)       Stool         Blood         Total Output 325        Net +3720                  Unmeasured Urine Occurrence 12 x                 Physical Exam:      General Appearance:  Alert, cooperative, in no acute distress   Head:  Normocephalic, without obvious abnormality, atraumatic   Eyes:      Lids and lashes normal, conjunctivae and sclerae normal, no icterus, no pallor, corneas clear, PERRLA   Ears:  Ears appear intact with no abnormalities noted   Throat: No oral lesions, no thrush, oral mucosa moist   Neck: No adenopathy, supple, trachea midline, no thyromegaly, no carotid bruit, no JVD   Lungs:  Clear to auscultation,respirations regular, even and unlabored   Heart:  Regular rhythm and normal rate, normal S1 and S2, no murmur, no gallop, no rub, no click   Chest Wall:  No abnormalities observed   Abdomen:  Normal bowel sounds, no masses, no organomegaly, soft non-tender, non-distended, no guarding, no rebound " tenderness   Extremities: RLE in immobilizer, noted RLE edema, no cyanosis, no redness   Pulses: Pulses palpable and equal bilaterally   Skin: No bleeding, bruising or rash   Lymph nodes: No palpable adenopathy   Neurologic: Cranial nerves 2 - 12 grossly intact, sensation intact, DTR present and equal bilaterally          Results Review:         Lab Results   Component Value Date     WBC 8.31 02/15/2017     HGB 8.3 (L) 02/15/2017     HCT 25.9 (L) 02/15/2017     .0 (H) 02/15/2017      02/15/2017               Lab Results   Component Value Date     GLUCOSE 81 02/09/2017     BUN 14 02/09/2017     CREATININE 0.87 02/09/2017     EGFRIFNONA 67 02/09/2017     BCR 16.1 02/09/2017      02/09/2017     K 3.8 02/09/2017      02/09/2017     CO2 32.1 (H) 02/09/2017     CALCIUM 9.4 02/09/2017     ALBUMIN 4.10 06/28/2016     LABIL2 1.5 06/28/2016     AST 15 06/28/2016     ALT 17 06/28/2016            Lab Results   Component Value Date     INR 1.89 10/10/2014     INR 1.54 10/09/2014     INR 1.10 10/08/2014         No results found for: POCGLU         Medication Review:      Current Facility-Administered Medications:   • apixaban (ELIQUIS) tablet 2.5 mg, 2.5 mg, Oral, Q12H, Wilfred Randle MD, 2.5 mg at 02/15/17 2201  • butorphanol (STADOL) nasal spray 1 spray, 1 spray, Nasal, PRN, Wilfred Randle MD  • dihydroergotamine (MIGRANAL) nasal spray 1 spray, 1 spray, Nasal, PRN, Wilfred Randle MD  • docusate sodium (COLACE) capsule 100 mg, 100 mg, Oral, BID PRN, Wilfred Randle MD  • FLUoxetine (PROzac) capsule 20 mg, 20 mg, Oral, Q8H, Wilfred Randle MD, 20 mg at 02/16/17 0600  • gabapentin (NEURONTIN) capsule 300 mg, 300 mg, Oral, Q12H, Wilfred Randle MD, 300 mg at 02/15/17 2200  • ketorolac (TORADOL) injection 30 mg, 30 mg, Intravenous, Q6H PRN, Wilfred Randle MD, 30 mg at 02/15/17 7698  • lactated ringers infusion, 75 mL/hr, Intravenous, Continuous, Wilfred  Edgard Randle MD, Last Rate: 75 mL/hr at 02/15/17 1335, 75 mL/hr at 02/15/17 1335  • lactated ringers infusion, 125 mL/hr, Intravenous, Continuous, Brant Flores MD, Last Rate: 125 mL/hr at 02/14/17 1134, 125 mL/hr at 02/14/17 1134  • lactated ringers infusion, 100 mL/hr, Intravenous, Continuous, Wilfred Randle MD, Last Rate: 100 mL/hr at 02/15/17 2147, 100 mL/hr at 02/15/17 2147  • levothyroxine (SYNTHROID, LEVOTHROID) tablet 100 mcg, 100 mcg, Oral, Q AM, Wilfred Randle MD, 100 mcg at 02/16/17 0600  • morphine injection 4 mg, 4 mg, Intravenous, Q3H PRN, Wilfred Randle MD, 4 mg at 02/15/17 1934  • Morphine injection 8 mg, 8 mg, Intravenous, Q3H PRN, Wilfred Randle MD, 8 mg at 02/16/17 0423  • naproxen (NAPROSYN) tablet 500 mg, 500 mg, Oral, BID With Meals, Wilfred Randle MD, 500 mg at 02/15/17 1830  • nortriptyline (PAMELOR) capsule 50 mg, 50 mg, Oral, Nightly, Wilfred Randle MD, 50 mg at 02/15/17 2201  • ondansetron (ZOFRAN) tablet 4 mg, 4 mg, Oral, Q6H PRN, Wilfred Randle MD, 4 mg at 02/16/17 0636  • oxyCODONE-acetaminophen (PERCOCET)  MG per tablet 1 tablet, 1 tablet, Oral, Q4H PRN, Samuel Duane Kreis, MD, 1 tablet at 02/16/17 0637  • verapamil SR (CALAN-SR) CR tablet 180 mg, 180 mg, Oral, Q24H, Wilfred Randle MD, 180 mg at 02/15/17 0830        Assessment/Plan     Primary osteoarthritis of right knee S/P TKA  Migraine Headaches  Insomnia  Acute Blood Loss Anemia  B12 def/Iron Def     Dilaudid + percocet prn pain     IVF @125 ml/hr     Continue gabapentin, nortriptyline and gabapentin for migraine prophylaxis.     Eliquis for DVT prophylaxis      Continue with PT     Monitor blood counts, transfuse <7.0     Add B12 and Iron supplematation     DESTINEY Pablo  02/16/17  7:52 AM      She's frustrated with the IV. She states it's beeping all the time. She's eating and drinking good. She is anemic. She has a borderline B12 deficiency. She  is iron deficient. She has previous history of colonoscopy. I've seen the patient and agree with the above note. Start on by mouth B12 and by mouth ferrous sulfate. Discontinue IV fluids. She had a fall this morning. She was getting out of bed and slid down to the floor. She doesn't think she actually heard anything. Repeat CBC tomorrow morning. Continue current pain control. Continue Eliquis for DVT prophylaxis.      Samuel Duane Kreis, MD  02/16/17  8:24 AM                            Revision History       Date/Time User Provider Type Action     2/16/2017  8:24 AM Samuel Duane Kreis, MD Physician Sign     2/16/2017  7:54 AM DESTINEY Pablo Physician Assistant Sign     View Details Report                      DESTINEY Uribe Physician Assistant Cosign Needed Orthopedics Progress Notes Date of Service: 2/16/2017  7:55 AM         Hide copied text  Inpatient Progress Note  Roseline Steiner  Date: 02/16/17  MRN: [id number]        Subjective:  Roseline Stiener is a 56 y.o. POD#2 right TKA. She is complaining of moderate pain especially with movement and when transferring. She fell yesterday but did not land on the knee. She states she was transferring to the chair, when she tried to stand up and slid down onto the floor. She denies paresthesias. She has no new complaints.      Objective:            Vitals:     02/15/17 2300 02/16/17 0300 02/16/17 0603 02/16/17 0633   BP: 127/63 115/69 103/57 126/74   BP Location: Left arm Left arm Right arm Right arm   Patient Position: Lying Lying Sitting Lying   Pulse: 82 79 95 81   Resp: 18 18 18 18   Temp: 98.4 °F (36.9 °C) 98 °F (36.7 °C)   97.9 °F (36.6 °C)   TempSrc:           SpO2: 97% 96%   96%   Weight:           Height:              Examination of the right knee reveals clean and dry incision. She has mild swelling right knee. No calf tenderness. Active dorsiflexion and plantarflexion. Neurovascular status is intact.      Labs:        Results from last 7 days  Lab Units  02/15/17  0729 02/15/17  0057 02/09/17  1037   WBC 10*3/mm3 --  8.31 5.66   HEMOGLOBIN g/dL 8.3* 7.9* 12.2   HEMATOCRIT % 25.9* 26.3* 39.8   MCV fL --  100.0* 96.8*   MCHC g/dL --  30.0* 30.7*   PLATELETS 10*3/mm3 --  205 239            Results from last 7 days  Lab Units 02/09/17  1037   SODIUM mmol/L 142   POTASSIUM mmol/L 3.8   CHLORIDE mmol/L 106   TOTAL CO2 mmol/L 32.1*   BUN mg/dL 14   CREATININE mg/dL 0.87   EGFR IF NONAFRICN AM mL/min/1.73 67   CALCIUM mg/dL 9.4   GLUCOSE mg/dL 81   Estimated Creatinine Clearance: 92.4 mL/min (by C-G formula based on Cr of 0.87).  No results found for: AMMONIA        No results found for: HGBA1C  No results found for: TSH, FREET4        Pain Management Panel     Pain Management Panel Latest Ref Rng 9/12/2015     AMPHETAMINES SCREEN, URINE Negative Negative     BARBITURATES SCREEN Negative Negative     BENZODIAZEPINE SCREEN, URINE Negative Negative     COCAINE SCREEN, URINE Negative Negative     METHADONE SCREEN, URINE Negative Negative                  Radiology:  Imaging Results (last 72 hours)     Procedure Component Value Units Date/Time     XR Knee 1 or 2 View Right [[id number]] Collected: 02/14/17 1050       Updated: 02/14/17 1052     Narrative:       EXAMINATION: XR KNEE 1 OR 2 VW RIGHT-       XR KNEE 1 OR 2 VW RIGHT-       INDICATION: POST SURG subsequent      COMPARISON: 1/18/2017      FINDINGS: Total right knee arthroplasty. Alignment maintained. Soft  tissue postsurgical change.          Impression:       As above.                  This report was finalized on 2/14/2017 10:50 AM by Dr. Chris Ring MD.                   Assessment:         ICD-10-CM ICD-9-CM   1. Primary osteoarthritis of right knee M17.11 715.16   2. Preop testing Z01.818 V72.84         Plan:  POD#2 Right TKA. Patient is stable. She has moderate pain. Continue knee immobilizer as tolerated. Continue PT/OT per protocol today. Will watch H&H. Continue Eliquis for DVT prophylaxis.         Camila  DESTINEY Morris                                Scheduled Meds Sorted by Name for Roseline Steiner as of 2/14/17 through 2/16/17       1 Day 3 Days 7 Days 10 Days < Today >    Legend:                           Inactive     Active     Other Encounter    Linked               Medications 02/14/17 02/15/17 02/16/17   apixaban (ELIQUIS) tablet 2.5 mg  Dose: 2.5 mg Freq: Every 12 Hours Scheduled Route: PO  Start: 02/15/17 1300    Admin Instructions:   Tablet may be crushed and suspended in 60 mL of water or D5W and immediately delivered via NG tube. Avoid grapefruit juice.     1302     2201          0812     2100            bacitracin 150,000 Units, polymyxin B 1,500,000 Units in sodium chloride (NS) 3,000 mL irrigation  Freq: Once Route: IR  Start: 02/14/17 0745 End: 02/14/17 0812    Admin Instructions:   ** For Irrigation ONLY **    0745     0812              ceFAZolin (ANCEF) 2 g in sodium chloride 0.9 % 100 mL IVPB  Dose: 2 g Freq: Every 8 Hours Route: IV  Indications of Use: SURGICAL PROPHYLAXIS  Start: 02/14/17 1145 End: 02/14/17 2201    Admin Instructions:   Time first dose from pre-op dose.    1408     2131 [C]              ceFAZolin (ANCEF) IVPB (duplex) 2 g  Dose: 2 g Freq: Once Route: IV  Indications of Use: SURGICAL PROPHYLAXIS  Start: 02/14/17 0645 End: 02/14/17 0740    Admin Instructions:   Administer within 1 hour of surgical incision. Redose 4 hours from pre-op dose if procedure ongoing or >1.5 L blood loss.    0740                cyanocobalamin injection 1,000 mcg  Dose: 1,000 mcg Freq: Every 28 Days Route: IM  Start: 02/16/17 0900 End: 02/16/17 0822      0822-D/C'd          ferrous sulfate tablet 325 mg  Dose: 325 mg Freq: 2 Times Daily With Meals Route: PO  Start: 02/16/17 0900    Admin Instructions:   Swallow whole. Do not crush, split, or chew. Take with food if GI upset occurs.      0956     1800            FLUoxetine (PROzac) capsule 20 mg  Dose: 20 mg Freq: Every 8 Hours Scheduled Route: PO  Start:  02/14/17 1400    1409     2208          0523     1302     2201        0600     1400     2200          gabapentin (NEURONTIN) capsule 300 mg  Dose: 300 mg Freq: Every 12 Hours Scheduled Route: PO  Start: 02/14/17 2100 2038            0830     2200          0812     2100            ketorolac (TORADOL) injection 15 mg  Dose: 15 mg Freq: Once Route: IV  Start: 02/15/17 0800 End: 02/15/17 0829    Admin Instructions:        0829               levothyroxine (SYNTHROID, LEVOTHROID) tablet 100 mcg  Dose: 100 mcg Freq: Every Early Morning Route: PO  Start: 02/15/17 0600    Admin Instructions:   Take on empty stomach.     0523            0600              naproxen (NAPROSYN) tablet 500 mg  Dose: 500 mg Freq: 2 Times Daily With Meals Route: PO  Start: 02/14/17 1800    1748            0830     1830          0812     1800            nortriptyline (PAMELOR) capsule 50 mg  Dose: 50 mg Freq: Nightly Route: PO  Start: 02/14/17 2100 2038 2201            2100              verapamil SR (CALAN-SR) CR tablet 180 mg  Dose: 180 mg Freq: Every 24 Hours Scheduled Route: PO  Start: 02/15/17 0900    Admin Instructions:   Do not crush, split, or chew. Avoid grapefruit juice.     0830            0812              vitamin B-12 (CYANOCOBALAMIN) tablet 1,000 mcg  Dose: 1,000 mcg Freq: Daily Route: PO  Start: 02/16/17 1000      0958              Medications 02/14/17 02/15/17 02/16/17         Continuous Meds Sorted by Name for Roseline Steiner as of 2/14/17 through 2/16/17      Legend:                           Inactive     Active     Other Encounter    Linked               Medications 02/14/17 02/15/17 02/16/17   lactated ringers infusion  Rate: 100 mL/hr Freq: Continuous Route: IV  Last Dose: 100 mL/hr (02/15/17 2147)  Start: 02/14/17 1145 End: 02/16/17 0822    ([id number])     2034 4174     2148          0822-D/C'd          lactated ringers infusion  Rate: 125 mL/hr Freq: Continuous Route: IV  Last Dose: 125 mL/hr (02/14/17  1134)  Start: 02/14/17 0730 End: 02/16/17 0822    0704     1134           0822-D/C'd          lactated ringers infusion  Rate: 75 mL/hr Freq: Continuous Route: IV  Last Dose: 75 mL/hr (02/15/17 1335)  Start: 02/14/17 0645 End: 02/16/17 0822    0735     0820     0950        1335            0822-D/C'd                PRN Meds Sorted by Name for Roseline Steiner as of 2/14/17 through 2/16/17      Legend:                           Inactive     Active     Other Encounter    Linked               Medications 02/14/17 02/15/17 02/16/17   butorphanol (STADOL) nasal spray 1 spray  Dose: 1 spray Freq: As Needed Route: NA  PRN Reasons: mild pain (1-3),moderate pain (4-6)  PRN Comment: migraine, take if migranol doesn't work  Start: 02/14/17 1109    Admin Instructions:   May repeat 1 spray in nostril after 60 min if 1st spray is inadequate for migraine control  Home Med Narcotic 1 - will require overrride          dexamethasone (DECADRON) injection  Freq: As Needed  Start: 02/14/17 0950 End: 02/14/17 1016    0950     1016-D/C'd          dihydroergotamine (MIGRANAL) nasal spray 1 spray  Dose: 1 spray Freq: As Needed Route: NA  PRN Reason: migraine  Start: 02/14/17 1109    Admin Instructions:   Use on onset of migraine.         docusate sodium (COLACE) capsule 100 mg  Dose: 100 mg Freq: 2 Times Daily PRN Route: PO  PRN Reason: constipation  Start: 02/14/17 1109    Admin Instructions:   Swallow whole. Do not open, crush, or chew capsule.         ePHEDrine injection  Freq: As Needed  Start: 02/14/17 0805 End: 02/14/17 1016    0805     0810     1016-D/C'd        FentaNYL Citrate (PF) (SUBLIMAZE) injection  Freq: As Needed  PRN Reason: severe pain (7-10)  Start: 02/14/17 0721 End: 02/14/17 1016    0721     0800     0820       0830     0835     0850       0855     0910     1016-D/C'd        FentaNYL Citrate (PF) (SUBLIMAZE) injection 50 mcg  Dose: 50 mcg Freq: Every 5 Minutes PRN Route: IV  PRN Reasons: moderate pain (4-6),severe pain  (7-10)  Start: 02/14/17 1016 End: 02/14/17 1055    Admin Instructions:   Maximum total dose of fentanyl is 100 mcg.  Use filter needle to withdraw dose from ampule.    1024     1035     1055-D/C'd        FentaNYL Citrate (PF) (SUBLIMAZE) injection 50 mcg  Dose: 50 mcg Freq: Every 5 Minutes PRN Route: IV  PRN Reasons: moderate pain (4-6),severe pain (7-10)  Start: 02/14/17 1016 End: 02/14/17 1055    Admin Instructions:   Maximum total dose of fentanyl is 100 mcg.  Use filter needle to withdraw dose from ampule.    1055-D/C'd            HYDROmorphone (DILAUDID) injection  Freq: As Needed  PRN Reason: severe pain (7-10)  Start: 02/14/17 0910 End: 02/14/17 1016    0910     0930     0940       1009     1016-D/C'd          HYDROmorphone (DILAUDID) injection 0.5 mg  Dose: 0.5 mg Freq: Every 2 Hours PRN Route: IV  PRN Reason: severe pain (7-10)  Start: 02/14/17 1109 End: 02/14/17 1353    1128     1353-D/C'd          And  HYDROmorphone (DILAUDID) injection 1 mg  Dose: 1 mg Freq: Every 2 Hours PRN Route: IV  PRN Reason: severe pain (7-10)  Start: 02/14/17 1400 End: 02/15/17 1231    1409     1638     2038       2301            0115     0323     1140       1231-D/C'd           And  naloxone (NARCAN) injection 0.1 mg  Dose: 0.1 mg Freq: Every 5 Minutes PRN Route: IV  PRN Reason: respiratory depression  Start: 02/14/17 1109 End: 02/14/17 1353    Admin Instructions:   If respiratory rate is less than 8 breaths/minute or patient is difficult to arouse stop any narcotics and contact physician. Administer slow IV push. Repeat as ordered until patient's respiratory rate is greater than 12 breaths/minute.    1353-D/C'd            And  naloxone (NARCAN) injection 0.1 mg  Dose: 0.1 mg Freq: Every 5 Minutes PRN Route: IV  PRN Reason: respiratory depression  Start: 02/14/17 1400 End: 02/15/17 1231    Admin Instructions:   If respiratory rate is less than 8 breaths/minute or patient is difficult to arouse stop any narcotics and contact  physician. Administer slow IV push. Repeat as ordered until patient's respiratory rate is greater than 12 breaths/minute.     1231-D/C'd           ipratropium-albuterol (DUO-NEB) nebulizer solution 3 mL  Dose: 3 mL Freq: Once As Needed Route: NEBULIZATION  PRN Reasons: wheezing,shortness of air  PRN Comment: bronchospasm  Start: 02/14/17 1016 End: 02/14/17 1055    1055-D/C'd            ipratropium-albuterol (DUO-NEB) nebulizer solution 3 mL  Dose: 3 mL Freq: Once As Needed Route: NEBULIZATION  PRN Reasons: wheezing,shortness of air  PRN Comment: bronchospasm  Start: 02/14/17 1016 End: 02/14/17 1055    1055-D/C'd            ketorolac (TORADOL) injection 30 mg  Dose: 30 mg Freq: Every 6 Hours PRN Route: IV  PRN Reason: moderate pain (4-6)  Start: 02/15/17 1232 End: 02/20/17 1231    Admin Instructions:        2323               lidocaine (XYLOCAINE) 2% injection  Freq: As Needed  Start: 02/14/17 0738 End: 02/14/17 1016    0738     1016-D/C'd          meperidine (DEMEROL) injection 12.5 mg  Dose: 12.5 mg Freq: Every 5 Minutes PRN Route: IV  PRN Reason: shivering  PRN Comment: May repeat x 1  Start: 02/14/17 1016 End: 02/14/17 1055    1055-D/C'd            meperidine (DEMEROL) injection 12.5 mg  Dose: 12.5 mg Freq: Every 5 Minutes PRN Route: IV  PRN Reason: shivering  PRN Comment: May repeat x 1  Start: 02/14/17 1016 End: 02/14/17 1055    1055-D/C'd            midazolam (VERSED) injection  Freq: As Needed Route: IV  Start: 02/14/17 0721 End: 02/14/17 1016    0721     0738     1016-D/C'd        morphine injection 4 mg  Dose: 4 mg Freq: Every 3 Hours PRN Route: IV  PRN Reason: moderate pain (4-6)  Start: 02/15/17 1231 End: 02/25/17 1230     1302     1615     1934           Morphine injection 8 mg  Dose: 8 mg Freq: Every 3 Hours PRN Route: IV  PRN Reason: severe pain (7-10)  Start: 02/15/17 1232 End: 02/25/17 1231      0048     0423     0957          ondansetron (ZOFRAN) injection  Freq: As Needed  PRN Reasons:  nausea,vomiting  Start: 02/14/17 0950 End: 02/14/17 1016    0950     1016-D/C'd          ondansetron (ZOFRAN) injection 4 mg  Dose: 4 mg Freq: Once As Needed Route: IV  PRN Reasons: nausea,vomiting  PRN Comment: may repeat times one dose  Start: 02/14/17 1016 End: 02/14/17 1055    Admin Instructions:   If BOTH ondansetron (ZOFRAN) and promethazine (PHENERGAN) are ordered use ondansetron first and THEN promethazine IF ondansetron is ineffective.    1055-D/C'd            ondansetron (ZOFRAN) injection 4 mg  Dose: 4 mg Freq: Once As Needed Route: IV  PRN Reasons: nausea,vomiting  PRN Comment: may repeat times one dose  Start: 02/14/17 1016 End: 02/14/17 1055    Admin Instructions:   If BOTH ondansetron (ZOFRAN) and promethazine (PHENERGAN) are ordered use ondansetron first and THEN promethazine IF ondansetron is ineffective.    1055-D/C'd            ondansetron (ZOFRAN) tablet 4 mg  Dose: 4 mg Freq: Every 6 Hours PRN Route: PO  PRN Reasons: nausea,vomiting  Start: 02/14/17 1109    Admin Instructions:   If BOTH ondansetron (ZOFRAN) and promethazine (PHENERGAN) are ordered use ondansetron first and THEN promethazine IF ondansetron is ineffective.    1409     2038          0323            0636              oxyCODONE-acetaminophen (PERCOCET)  MG per tablet 1 tablet  Dose: 1 tablet Freq: Every 4 Hours PRN Route: PO  PRN Reason: severe pain (7-10)  Start: 02/15/17 0714 End: 02/25/17 0713    Admin Instructions:   Do not exceed more than 4 g acetaminophen in 24 hour period     0924     8952     1831        0637              oxyCODONE-acetaminophen (PERCOCET) 5-325 MG per tablet 1 tablet  Dose: 1 tablet Freq: Every 4 Hours PRN Route: PO  PRN Reason: severe pain (7-10)  Start: 02/14/17 1109 End: 02/15/17 0716    Admin Instructions:   Do not exceed 4g of acetaminophen in a 24 hour period.    1247     1748     2208        0432     0716-D/C'd         phenylephrine (LAURA-SYNEPHRINE) injection  Freq: As Needed  Start: 02/14/17  0814 End: 02/14/17 1016    0814     0828     0930       0940     0950     1016-D/C'd        Propofol (DIPRIVAN) injection  Freq: As Needed Route: IV  Start: 02/14/17 0738 End: 02/14/17 1016    0738     1016-D/C'd          sodium chloride (NS) irrigation solution  Freq: As Needed  Start: 02/14/17 0812 End: 02/14/17 1008    0812 [C]     1008-D/C'd          sodium chloride 0.9 % flush 1-10 mL  Dose: 1-10 mL Freq: As Needed Route: IV  PRN Reason: line care  Start: 02/14/17 0659 End: 02/14/17 1014    1014-D/C'd            sodium chloride 0.9 % flush 1-10 mL  Dose: 1-10 mL Freq: As Needed Route: IV  PRN Reason: line care  Start: 02/14/17 0605 End: 02/14/17 1014    1014-D/C'd            Medications 02/14/17 02/15/17 02/16/17

## 2017-02-16 NOTE — PROGRESS NOTES
Acute Care - Physical Therapy Treatment Note   [place]     Patient Name: Roseline Steiner  : 1960  MRN: [id number]  Today's Date: 2017  Onset of Illness/Injury or Date of Surgery Date: 17  Date of Referral to PT: 17  Referring Physician: Dr. Randle    Admit Date: 2017    Visit Dx:    ICD-10-CM ICD-9-CM   1. Primary osteoarthritis of right knee M17.11 715.16   2. Preop testing Z01.818 V72.84     Patient Active Problem List   Diagnosis   • Migraine with status migrainosus   • Arthralgia of left hip   • Greater trochanteric bursitis   • Primary osteoarthritis of right knee               Adult Rehabilitation Note       17 1547 02/15/17 154       Rehab Assessment/Intervention    Discipline physical therapist  -BC physical therapist  -BC     Document Type therapy note (daily note)  -BC therapy note (daily note)  -BC     Subjective Information agree to therapy;complains of;weakness  -BC agree to therapy;complains of;weakness  -BC     Patient Effort, Rehab Treatment good  -BC good  -BC     Recorded by [BC] Mackenzie Foster, PT [BC] Mackenzie Foster, PT     Pain Assessment    Pain Assessment Hernandez-Baker FACES  -BC Hernandez-Baker FACES  -BC     Hernandez-Baker FACES Pain Rating 6  -BC 4  -BC     Pain Location Knee  -BC Knee  -BC     Recorded by [BC] Mackenzie Foster, PT [BC] Mackenzie Foster, PT     Cognitive Assessment/Intervention    Current Cognitive/Communication Assessment functional  -BC functional  -BC     Orientation Status oriented x 4  -BC oriented x 4  -BC     Follows Commands/Answers Questions 100% of the time  -% of the time;able to follow single-step instructions  -BC     Personal Safety WNL/WFL;decreased awareness, need for assist;decreased awareness, need for safety  -BC WNL/WFL;decreased awareness, need for safety  -BC     Personal Safety Interventions gait belt;nonskid shoes/slippers when out of bed  -BC gait belt;nonskid shoes/slippers when out of bed  -BC     Recorded by  [BC] Mackenzie Foster, PT [BC] Mackenzie Foster PT     Bed Mobility, Assessment/Treatment    Bed Mobility, Assistive Device bed rails  -BC bed rails  -BC     Bed Mob, Supine to Sit, [place] verbal cues required;nonverbal cues required (demo/gesture);moderate assist (50% patient effort);2 person assist required  -BC verbal cues required;nonverbal cues required (demo/gesture);moderate assist (50% patient effort);2 person assist required  -BC     Bed Mob, Sit to Supine, [place] verbal cues required;nonverbal cues required (demo/gesture);moderate assist (50% patient effort);2 person assist required  -BC verbal cues required;nonverbal cues required (demo/gesture);moderate assist (50% patient effort);2 person assist required  -BC     Recorded by [BC] Mackenzie Foster PT [BC] Mackenzie Foster PT     Transfer Assessment/Treatment    Transfers, Sit-Stand [place] verbal cues required;nonverbal cues required (demo/gesture);minimum assist (75% patient effort);moderate assist (50% patient effort);2 person assist required  -BC verbal cues required;nonverbal cues required (demo/gesture);minimum assist (75% patient effort);moderate assist (50% patient effort);2 person assist required  -BC     Transfers, Stand-Sit [place] verbal cues required;nonverbal cues required (demo/gesture);moderate assist (50% patient effort);minimum assist (75% patient effort);2 person assist required  -BC verbal cues required;nonverbal cues required (demo/gesture);moderate assist (50% patient effort);minimum assist (75% patient effort);2 person assist required  -BC     Transfers, Sit-Stand-Sit, Assist Device standard walker  -BC standard walker  -BC     Recorded by [BC] Mackenzie Foster PT [BC] Mackenzie Foster PT     Gait Assessment/Treatment    Gait, [place] Level minimum assist (75% patient effort);2 person assist required;verbal cues required;nonverbal cues required (demo/gesture)  -BC minimum assist (75% patient effort);2  person assist required;verbal cues required;nonverbal cues required (demo/gesture)  -BC     Gait, Assistive Device rolling walker  -BC rolling walker  -BC     Gait, Distance (Feet) 100   120pm  -BC 80   75 pm  -BC     Recorded by [BC] Mackenzie Foster, PT [BC] Mackenzie Foster, PT     Positioning and Restraints    Pre-Treatment Position in bed  -BC in bed  -BC     Post Treatment Position chair  -BC chair  -BC     In Bed notified nsg;supine;call light within reach;encouraged to call for assist;with family/caregiver;side rails up x3   pm  -BC notified nsg;supine;call light within reach;encouraged to call for assist;with family/caregiver;side rails up x3   pm  -BC     In Chair notified nsg;sitting;encouraged to call for assist;call light within reach  -BC notified nsg;sitting;call light within reach;encouraged to call for assist   am  -BC     Recorded by [BC] Mackenzie Foster, PT [BC] Mackenzie Foster, PT       User Key  (r) = Recorded By, (t) = Taken By, (c) = Cosigned By    Initials Name Effective Dates    BC Mackenzie Foster, PT 03/14/16 -                 IP PT Goals       02/14/17 1816          Bed Mobility PT LTG    Bed Mobility PT LTG, Date Established 02/14/17  -BC      Bed Mobility PT LTG, Time to Achieve 2 - 3 days  -BC      Bed Mobility PT LTG, Activity Type all bed mobility  -BC      Bed Mobility PT LTG, [place] Level contact guard assist  -BC      Bed Mobility PT Goal  LTG, Assist Device bed rails  -BC      Transfer Training PT LTG    Transfer Training PT LTG, Time to Achieve 2 - 3 days  -BC      Transfer Training PT LTG, Activity Type all transfers  -BC      Transfer Training PT LTG, [place] Level contact guard assist  -BC      Transfer Training PT LTG, Assist Device walker, rolling  -BC      Gait Training PT LTG    Gait Training Goal PT LTG, Date Established 02/14/17  -BC      Gait Training Goal PT LTG, Time to Achieve 2 - 3 days  -BC      Gait Training Goal PT LTG, [place] Level contact  guard assist  -BC      Gait Training Goal PT LTG, Assist Device walker, rolling  -BC      Gait Training Goal PT LTG, Distance to Achieve 80  -BC        User Key  (r) = Recorded By, (t) = Taken By, (c) = Cosigned By    Initials Name Provider Type    BC Mackenzie Foster, PT Physical Therapist          Physical Therapy Education     Title: PT OT SLP Therapies (Done)     Topic: Physical Therapy (Done)     Point: Mobility training (Done)    Learning Progress Summary    Learner Readiness Method Response Comment Documented by Status   Patient Acceptance E [facility] 02/16/17 1606 Done    Acceptance E NR  BC 02/15/17 1612 Active    Acceptance E NR  BC 02/14/17 1816 Active               Point: Home exercise program (Done)    Learning Progress Summary    Learner Readiness Method Response Comment Documented by Status   Patient Acceptance E [facility] 02/16/17 1606 Done    Acceptance E NR  BC 02/15/17 1612 Active    Acceptance E [facility] 02/14/17 1816 Active               Point: Body mechanics (Done)    Learning Progress Summary    Learner Readiness Method Response Comment Documented by Status   Patient Acceptance E [facility] 02/16/17 1606 Done    Acceptance E NR  BC 02/15/17 1612 Active    Acceptance E [facility] 02/14/17 1816 Active               Point: Precautions (Done)    Learning Progress Summary    Learner Readiness Method Response Comment Documented by Status   Patient Acceptance E [facility] 02/16/17 1606 Done    Acceptance E NR  BC 02/15/17 1612 Active    Acceptance E [facility] 02/14/17 1816 Active                      User Key     Initials Effective Dates Name Provider Type Discipline    BC 03/14/16 -  Mackenzie Foster PT Physical Therapist PT                    PT Recommendation and Plan  Planned Therapy Interventions: balance training, bed mobility training, gait training, home exercise program, patient/family education, strengthening, transfer training  PT Frequency: 2 times/day, 5 times/wk, per priority policy             Outcome  Measures       02/16/17 1607 02/15/17 1600 02/14/17 1800    How much help from another person do you currently need...    Turning from your back to your side while in flat bed without using bedrails? 3  -BC 3  -BC 2  -BC    Moving from lying on back to sitting on the side of a flat bed without bedrails? 3  -BC 2  -BC 2  -BC    Moving to and from a bed to a chair (including a wheelchair)? 3  -BC 3  -BC 2  -BC    Standing up from a chair using your arms (e.g., wheelchair, bedside chair)? 3  -BC 3  -BC 2  -BC    Climbing 3-5 steps with a railing? 2  -BC 2  -BC 1  -BC    To walk in hospital room? 3  -BC 3  -BC 2  -BC    AM-PAC 6 Clicks Score 17  -BC 16  -BC 11  -BC    Functional Assessment    Outcome Measure Options AM-PAC 6 Clicks Basic Mobility (PT)  -BC AM-PAC 6 Clicks Basic Mobility (PT)  -BC AM-PAC 6 Clicks Basic Mobility (PT)  -BC      User Key  (r) = Recorded By, (t) = Taken By, (c) = Cosigned By    Initials Name Provider Type    BC Mackenzie Foster PT Physical Therapist           Time Calculation:         PT Charges       02/16/17 1608          Time Calculation    Start Time --   60  -BC      PT Received On 02/16/17  -BC        User Key  (r) = Recorded By, (t) = Taken By, (c) = Cosigned By    Initials Name Provider Type    GINA Foster PT Physical Therapist          Therapy Charges for Today     Code Description Service Date Service Provider Modifiers Qty    93665184154 HC GAIT TRAINING EA 15 MIN 2/15/2017 Mackenzie Foster, PT GP 2    78655606560 HC PT THERAPEUTIC ACT EA 15 MIN 2/15/2017 Mackenzie Foster, PT GP 2    59172560070 HC PT THER SUPP EA 15 MIN 2/15/2017 Mackenzie Foster, PT GP 4    74142862213 HC GAIT TRAINING EA 15 MIN 2/16/2017 Mackenzie Foster, PT GP 2    39093247531 HC PT THERAPEUTIC ACT EA 15 MIN 2/16/2017 Mackenzie Foster, PT GP 2    23218648025 HC PT THER SUPP EA 15 MIN 2/16/2017 Mackenzie Foster, PT GP 4          PT G-Codes  Outcome Measure Options: AM-PAC 6 Clicks Basic Mobility  (PT)  Score: 11  Functional Limitation: Mobility: Walking and moving around  Mobility: Walking and Moving Around Current Status (): At least 60 percent but less than 80 percent impaired, limited or restricted  Mobility: Walking and Moving Around Goal Status (): At least 40 percent but less than 60 percent impaired, limited or restricted    Mackenzie Foster, PT  2/16/2017

## 2017-02-16 NOTE — PLAN OF CARE
Problem: Patient Care Overview (Adult)  Goal: Plan of Care Review  Outcome: Ongoing (interventions implemented as appropriate)    02/16/17 0244   Coping/Psychosocial Response Interventions   Plan Of Care Reviewed With patient   Patient Care Overview   Progress progress toward functional goals as expected       Goal: Adult Individualization and Mutuality  Outcome: Ongoing (interventions implemented as appropriate)    02/16/17 0244   Individualization   Patient Specific Preferences Pain control.   Patient Specific Goals Pain relief with pain medications.   Patient Specific Interventions Pain medications given as ordered.   Mutuality/Individual Preferences   What Anxieties, Fears or Concerns Do You Have About Your Health or Care? N/A   What Questions Do You Have About Your Health or Care? N/A   What Information Would Help Us Give You More Personalized Care? N/A       Goal: Discharge Needs Assessment  Outcome: Ongoing (interventions implemented as appropriate)    02/16/17 0244   Discharge Needs Assessment   Concerns To Be Addressed no discharge needs identified   Readmission Within The Last 30 Days no previous admission in last 30 days   Discharge Facility/Level Of Care Needs home with home health   Discharge Disposition still a patient   Current Health   Anticipated Changes Related to Illness none   Self-Care   Equipment Currently Used at Home walker, rolling;commode   Living Environment   Transportation Available car;family or friend will provide         Problem: Knee Replacement, Total (Adult)  Goal: Signs and Symptoms of Listed Potential Problems Will be Absent or Manageable (Knee Replacement, Total)  Outcome: Ongoing (interventions implemented as appropriate)    02/16/17 0244   Knee Replacement, Total   Problems Assessed (Total Knee Replacement) all   Problems Present (Total Knee Replacement) pain         Problem: Fall Risk (Adult)  Goal: Identify Related Risk Factors and Signs and Symptoms  Outcome: Ongoing  (interventions implemented as appropriate)    02/16/17 0244   Fall Risk   Fall Risk: Related Risk Factors environment unfamiliar;gait/mobility problems;history of falls   Fall Risk: Signs and Symptoms presence of risk factors       Goal: Absence of Falls  Outcome: Ongoing (interventions implemented as appropriate)    02/16/17 0244   Fall Risk (Adult)   Absence of Falls making progress toward outcome         Problem: Pain, Acute (Adult)  Goal: Identify Related Risk Factors and Signs and Symptoms  Outcome: Ongoing (interventions implemented as appropriate)    02/16/17 0244   Pain, Acute   Related Risk Factors (Acute Pain) surgery;positioning;persistent pain   Signs and Symptoms (Acute Pain) verbalization of pain descriptors       Goal: Acceptable Pain Control/Comfort Level  Outcome: Ongoing (interventions implemented as appropriate)    02/16/17 0244   Pain, Acute (Adult)   Acceptable Pain Control/Comfort Level making progress toward outcome

## 2017-02-16 NOTE — PROGRESS NOTES
"     LOS: 2 days     Chief Complaint:  Right Knee OA S/P TKA    Subjective     Interval History:     Patient is postop day #2 from total knee arthroplasty.  Patient overall is doing some better from a pain control aspect.  She was noted B12 and iron deficient.  She has been per to stating with physical therapy.  Unfortunately did take a fall this morning however does not have any acute injuries at this point that she is endorsing.  She denies any chest pain or shortness of breath.  Repeat H&H from yesterday was stable at 8.3.    Objective     Vital Signs  Visit Vitals   • /74 (BP Location: Right arm, Patient Position: Lying)   • Pulse 81   • Temp 97.9 °F (36.6 °C)   • Resp 18   • Ht 67\" (170.2 cm)   • Wt 253 lb (115 kg)  Comment: bed   • SpO2 96%   • Breastfeeding No   • BMI 39.63 kg/m2     Intake & Output (last day)       02/15 0701 - 02/16 0700 02/16 0701 - 02/17 0700    P.O. 2080     I.V. (mL/kg) 1965 (17.1)     Total Intake(mL/kg) 4045 (35.2)     Urine (mL/kg/hr) 0 (0)     Other 325 (0.1)     Stool      Blood      Total Output 325      Net +3720            Unmeasured Urine Occurrence 12 x             Physical Exam:     General Appearance:    Alert, cooperative, in no acute distress   Head:    Normocephalic, without obvious abnormality, atraumatic   Eyes:            Lids and lashes normal, conjunctivae and sclerae normal, no   icterus, no pallor, corneas clear, PERRLA   Ears:    Ears appear intact with no abnormalities noted   Throat:   No oral lesions, no thrush, oral mucosa moist   Neck:   No adenopathy, supple, trachea midline, no thyromegaly, no   carotid bruit, no JVD   Lungs:     Clear to auscultation,respirations regular, even and                  unlabored    Heart:    Regular rhythm and normal rate, normal S1 and S2, no            murmur, no gallop, no rub, no click   Chest Wall:    No abnormalities observed   Abdomen:     Normal bowel sounds, no masses, no organomegaly, soft        non-tender, " non-distended, no guarding, no rebound                tenderness   Extremities:   RLE in immobilizer, noted RLE edema, no cyanosis, no             redness   Pulses:   Pulses palpable and equal bilaterally   Skin:   No bleeding, bruising or rash   Lymph nodes:   No palpable adenopathy   Neurologic:   Cranial nerves 2 - 12 grossly intact, sensation intact, DTR       present and equal bilaterally        Results Review:    Lab Results   Component Value Date    WBC 8.31 02/15/2017    HGB 8.3 (L) 02/15/2017    HCT 25.9 (L) 02/15/2017    .0 (H) 02/15/2017     02/15/2017       Lab Results   Component Value Date    GLUCOSE 81 02/09/2017    BUN 14 02/09/2017    CREATININE 0.87 02/09/2017    EGFRIFNONA 67 02/09/2017    BCR 16.1 02/09/2017     02/09/2017    K 3.8 02/09/2017     02/09/2017    CO2 32.1 (H) 02/09/2017    CALCIUM 9.4 02/09/2017    ALBUMIN 4.10 06/28/2016    LABIL2 1.5 06/28/2016    AST 15 06/28/2016    ALT 17 06/28/2016     Lab Results   Component Value Date    INR 1.89 10/10/2014    INR 1.54 10/09/2014    INR 1.10 10/08/2014       No results found for: POCGLU       Medication Review:     Current Facility-Administered Medications:   •  apixaban (ELIQUIS) tablet 2.5 mg, 2.5 mg, Oral, Q12H, Wilfred Randle MD, 2.5 mg at 02/15/17 2201  •  butorphanol (STADOL) nasal spray 1 spray, 1 spray, Nasal, PRN, Wilfred Randle MD  •  dihydroergotamine (MIGRANAL) nasal spray 1 spray, 1 spray, Nasal, PRN, Wilfred Randle MD  •  docusate sodium (COLACE) capsule 100 mg, 100 mg, Oral, BID PRN, Wilfred Randle MD  •  FLUoxetine (PROzac) capsule 20 mg, 20 mg, Oral, Q8H, Wilfred Randle MD, 20 mg at 02/16/17 0600  •  gabapentin (NEURONTIN) capsule 300 mg, 300 mg, Oral, Q12H, Wilfred Randle MD, 300 mg at 02/15/17 2200  •  ketorolac (TORADOL) injection 30 mg, 30 mg, Intravenous, Q6H PRN, Wilfred Randle MD, 30 mg at 02/15/17 3933  •  lactated ringers infusion, 75  mL/hr, Intravenous, Continuous, Wilfred Randle MD, Last Rate: 75 mL/hr at 02/15/17 1335, 75 mL/hr at 02/15/17 1335  •  lactated ringers infusion, 125 mL/hr, Intravenous, Continuous, Brant Flores MD, Last Rate: 125 mL/hr at 02/14/17 1134, 125 mL/hr at 02/14/17 1134  •  lactated ringers infusion, 100 mL/hr, Intravenous, Continuous, Wilfred Randle MD, Last Rate: 100 mL/hr at 02/15/17 2147, 100 mL/hr at 02/15/17 2147  •  levothyroxine (SYNTHROID, LEVOTHROID) tablet 100 mcg, 100 mcg, Oral, Q AM, Wilfred Randle MD, 100 mcg at 02/16/17 0600  •  morphine injection 4 mg, 4 mg, Intravenous, Q3H PRN, Wilfred Randle MD, 4 mg at 02/15/17 1934  •  Morphine injection 8 mg, 8 mg, Intravenous, Q3H PRN, Wilfred Randle MD, 8 mg at 02/16/17 0423  •  naproxen (NAPROSYN) tablet 500 mg, 500 mg, Oral, BID With Meals, Wilfred Randle MD, 500 mg at 02/15/17 1830  •  nortriptyline (PAMELOR) capsule 50 mg, 50 mg, Oral, Nightly, Wilfred Randle MD, 50 mg at 02/15/17 2201  •  ondansetron (ZOFRAN) tablet 4 mg, 4 mg, Oral, Q6H PRN, Wilfred Randle MD, 4 mg at 02/16/17 0636  •  oxyCODONE-acetaminophen (PERCOCET)  MG per tablet 1 tablet, 1 tablet, Oral, Q4H PRN, Samuel Duane Kreis, MD, 1 tablet at 02/16/17 0637  •  verapamil SR (CALAN-SR) CR tablet 180 mg, 180 mg, Oral, Q24H, Wilfred Randle MD, 180 mg at 02/15/17 0830      Assessment/Plan   Primary osteoarthritis of right knee S/P TKA  Migraine Headaches  Insomnia  Acute Blood Loss Anemia  B12 def/Iron Def    Dilaudid + percocet prn pain    IVF @125 ml/hr    Continue gabapentin, nortriptyline and gabapentin for migraine prophylaxis.    Eliquis for DVT prophylaxis     Continue with PT    Monitor blood counts, transfuse <7.0    Add B12 and Iron supplematation    DESTINEY Pablo  02/16/17  7:52 AM     She's frustrated with the IV.  She states it's beeping all the time.  She's eating and drinking good.  She is anemic.   She has a borderline B12 deficiency.  She is iron deficient.  She has previous history of colonoscopy.  I've seen the patient and agree with the above note.  Start on by mouth B12 and by mouth ferrous sulfate.  Discontinue IV fluids.  She had a fall this morning.  She was getting out of bed and slid down to the floor.  She doesn't think she actually heard anything.  Repeat CBC tomorrow morning.  Continue current pain control.  Continue Eliquis for DVT prophylaxis.      Samuel Duane Kreis, MD  02/16/17  8:24 AM

## 2017-02-17 LAB
ABO GROUP BLD: NORMAL
ANION GAP SERPL CALCULATED.3IONS-SCNC: 1.9 MMOL/L (ref 3.6–11.2)
BASOPHILS # BLD AUTO: 0.03 10*3/MM3 (ref 0–0.3)
BASOPHILS NFR BLD AUTO: 0.4 % (ref 0–2)
BLD GP AB SCN SERPL QL: NEGATIVE
BUN BLD-MCNC: 8 MG/DL (ref 7–21)
BUN/CREAT SERPL: 9.9 (ref 7–25)
CALCIUM SPEC-SCNC: 8.7 MG/DL (ref 7.7–10)
CHLORIDE SERPL-SCNC: 106 MMOL/L (ref 99–112)
CO2 SERPL-SCNC: 30.1 MMOL/L (ref 24.3–31.9)
CREAT BLD-MCNC: 0.81 MG/DL (ref 0.43–1.29)
DEPRECATED RDW RBC AUTO: 49.4 FL (ref 37–54)
EOSINOPHIL # BLD AUTO: 0.31 10*3/MM3 (ref 0–0.7)
EOSINOPHIL NFR BLD AUTO: 4.1 % (ref 0–5)
ERYTHROCYTE [DISTWIDTH] IN BLOOD BY AUTOMATED COUNT: 14.3 % (ref 11.5–14.5)
GFR SERPL CREATININE-BSD FRML MDRD: 73 ML/MIN/1.73
GLUCOSE BLD-MCNC: 125 MG/DL (ref 70–110)
HCT VFR BLD AUTO: 21.4 % (ref 37–47)
HCT VFR BLD AUTO: 25.7 % (ref 37–47)
HGB BLD-MCNC: 6.4 G/DL (ref 12–16)
HGB BLD-MCNC: 8.2 G/DL (ref 12–16)
IMM GRANULOCYTES # BLD: 0.04 10*3/MM3 (ref 0–0.03)
IMM GRANULOCYTES NFR BLD: 0.5 % (ref 0–0.5)
LYMPHOCYTES # BLD AUTO: 2.13 10*3/MM3 (ref 1–3)
LYMPHOCYTES NFR BLD AUTO: 28.5 % (ref 21–51)
MCH RBC QN AUTO: 30.2 PG (ref 27–33)
MCHC RBC AUTO-ENTMCNC: 29.9 G/DL (ref 33–37)
MCV RBC AUTO: 100.9 FL (ref 80–94)
MONOCYTES # BLD AUTO: 0.93 10*3/MM3 (ref 0.1–0.9)
MONOCYTES NFR BLD AUTO: 12.4 % (ref 0–10)
NEUTROPHILS # BLD AUTO: 4.04 10*3/MM3 (ref 1.4–6.5)
NEUTROPHILS NFR BLD AUTO: 54.1 % (ref 30–70)
OSMOLALITY SERPL CALC.SUM OF ELEC: 275.5 MOSM/KG (ref 273–305)
PLATELET # BLD AUTO: 176 10*3/MM3 (ref 130–400)
PMV BLD AUTO: 10.1 FL (ref 6–10)
POTASSIUM BLD-SCNC: 4 MMOL/L (ref 3.5–5.3)
RBC # BLD AUTO: 2.12 10*6/MM3 (ref 4.2–5.4)
RH BLD: POSITIVE
SODIUM BLD-SCNC: 138 MMOL/L (ref 135–153)
WBC NRBC COR # BLD: 7.48 10*3/MM3 (ref 4.5–12.5)

## 2017-02-17 PROCEDURE — 63710000001 DIPHENHYDRAMINE PER 50 MG: Performed by: FAMILY MEDICINE

## 2017-02-17 PROCEDURE — 97530 THERAPEUTIC ACTIVITIES: CPT

## 2017-02-17 PROCEDURE — 85014 HEMATOCRIT: CPT | Performed by: FAMILY MEDICINE

## 2017-02-17 PROCEDURE — 86920 COMPATIBILITY TEST SPIN: CPT

## 2017-02-17 PROCEDURE — 99024 POSTOP FOLLOW-UP VISIT: CPT | Performed by: PHYSICIAN ASSISTANT

## 2017-02-17 PROCEDURE — 25010000002 KETOROLAC TROMETHAMINE PER 15 MG: Performed by: ORTHOPAEDIC SURGERY

## 2017-02-17 PROCEDURE — 80048 BASIC METABOLIC PNL TOTAL CA: CPT | Performed by: FAMILY MEDICINE

## 2017-02-17 PROCEDURE — 25010000002 MORPHINE PER 10 MG: Performed by: ORTHOPAEDIC SURGERY

## 2017-02-17 PROCEDURE — 36430 TRANSFUSION BLD/BLD COMPNT: CPT

## 2017-02-17 PROCEDURE — P9016 RBC LEUKOCYTES REDUCED: HCPCS

## 2017-02-17 PROCEDURE — 86900 BLOOD TYPING SEROLOGIC ABO: CPT

## 2017-02-17 PROCEDURE — 85018 HEMOGLOBIN: CPT | Performed by: FAMILY MEDICINE

## 2017-02-17 PROCEDURE — 97116 GAIT TRAINING THERAPY: CPT

## 2017-02-17 PROCEDURE — 86901 BLOOD TYPING SEROLOGIC RH(D): CPT

## 2017-02-17 PROCEDURE — 63710000001 DIPHENHYDRAMINE PER 50 MG

## 2017-02-17 PROCEDURE — 85025 COMPLETE CBC W/AUTO DIFF WBC: CPT | Performed by: FAMILY MEDICINE

## 2017-02-17 PROCEDURE — 86850 RBC ANTIBODY SCREEN: CPT

## 2017-02-17 PROCEDURE — C1751 CATH, INF, PER/CENT/MIDLINE: HCPCS

## 2017-02-17 RX ORDER — ACETAMINOPHEN 325 MG/1
TABLET ORAL
Status: COMPLETED
Start: 2017-02-17 | End: 2017-02-17

## 2017-02-17 RX ORDER — DIPHENHYDRAMINE HCL 25 MG
25 CAPSULE ORAL ONCE
Status: COMPLETED | OUTPATIENT
Start: 2017-02-17 | End: 2017-02-17

## 2017-02-17 RX ORDER — SODIUM CHLORIDE 0.9 % (FLUSH) 0.9 %
10 SYRINGE (ML) INJECTION EVERY 12 HOURS SCHEDULED
Status: DISCONTINUED | OUTPATIENT
Start: 2017-02-17 | End: 2017-02-18 | Stop reason: HOSPADM

## 2017-02-17 RX ORDER — DIPHENHYDRAMINE HCL 25 MG
CAPSULE ORAL
Status: COMPLETED
Start: 2017-02-17 | End: 2017-02-17

## 2017-02-17 RX ORDER — SODIUM CHLORIDE 0.9 % (FLUSH) 0.9 %
10 SYRINGE (ML) INJECTION AS NEEDED
Status: DISCONTINUED | OUTPATIENT
Start: 2017-02-17 | End: 2017-02-18 | Stop reason: HOSPADM

## 2017-02-17 RX ORDER — LIDOCAINE HYDROCHLORIDE 10 MG/ML
20 INJECTION, SOLUTION INFILTRATION; PERINEURAL ONCE
Status: COMPLETED | OUTPATIENT
Start: 2017-02-17 | End: 2017-02-17

## 2017-02-17 RX ORDER — SODIUM CHLORIDE 9 MG/ML
INJECTION, SOLUTION INTRAVENOUS
Status: COMPLETED
Start: 2017-02-17 | End: 2017-02-17

## 2017-02-17 RX ORDER — SODIUM CHLORIDE 9 MG/ML
100 INJECTION, SOLUTION INTRAVENOUS ONCE
Status: DISCONTINUED | OUTPATIENT
Start: 2017-02-17 | End: 2017-02-17

## 2017-02-17 RX ORDER — ACETAMINOPHEN 325 MG/1
650 TABLET ORAL ONCE
Status: COMPLETED | OUTPATIENT
Start: 2017-02-17 | End: 2017-02-17

## 2017-02-17 RX ADMIN — OXYCODONE HYDROCHLORIDE AND ACETAMINOPHEN 1 TABLET: 10; 325 TABLET ORAL at 14:44

## 2017-02-17 RX ADMIN — NORTRIPTYLINE HYDROCHLORIDE 50 MG: 25 CAPSULE ORAL at 22:34

## 2017-02-17 RX ADMIN — OXYCODONE HYDROCHLORIDE AND ACETAMINOPHEN 1 TABLET: 10; 325 TABLET ORAL at 09:33

## 2017-02-17 RX ADMIN — LEVOTHYROXINE SODIUM 100 MCG: 100 TABLET ORAL at 06:10

## 2017-02-17 RX ADMIN — FLUOXETINE 20 MG: 20 CAPSULE ORAL at 14:19

## 2017-02-17 RX ADMIN — ACETAMINOPHEN 650 MG: 325 TABLET, FILM COATED ORAL at 15:44

## 2017-02-17 RX ADMIN — ONDANSETRON HYDROCHLORIDE 4 MG: 4 TABLET, FILM COATED ORAL at 02:50

## 2017-02-17 RX ADMIN — LIDOCAINE HYDROCHLORIDE 0.5 ML: 10 INJECTION, SOLUTION INFILTRATION; PERINEURAL at 09:52

## 2017-02-17 RX ADMIN — FERROUS SULFATE TAB 325 MG (65 MG ELEMENTAL FE) 325 MG: 325 (65 FE) TAB at 09:33

## 2017-02-17 RX ADMIN — KETOROLAC TROMETHAMINE 30 MG: 30 INJECTION, SOLUTION INTRAMUSCULAR; INTRAVENOUS at 18:29

## 2017-02-17 RX ADMIN — GABAPENTIN 300 MG: 300 CAPSULE ORAL at 09:34

## 2017-02-17 RX ADMIN — Medication 1000 MCG: at 09:33

## 2017-02-17 RX ADMIN — NAPROXEN 500 MG: 250 TABLET ORAL at 09:34

## 2017-02-17 RX ADMIN — VERAPAMIL HYDROCHLORIDE 180 MG: 180 TABLET, FILM COATED, EXTENDED RELEASE ORAL at 09:33

## 2017-02-17 RX ADMIN — APIXABAN 2.5 MG: 2.5 TABLET, FILM COATED ORAL at 22:34

## 2017-02-17 RX ADMIN — DIPHENHYDRAMINE HYDROCHLORIDE 25 MG: 25 CAPSULE ORAL at 04:37

## 2017-02-17 RX ADMIN — Medication 10 ML: at 10:30

## 2017-02-17 RX ADMIN — FLUOXETINE 20 MG: 20 CAPSULE ORAL at 22:34

## 2017-02-17 RX ADMIN — FERROUS SULFATE TAB 325 MG (65 MG ELEMENTAL FE) 325 MG: 325 (65 FE) TAB at 18:30

## 2017-02-17 RX ADMIN — SODIUM CHLORIDE 50 ML: 9 INJECTION, SOLUTION INTRAVENOUS at 15:35

## 2017-02-17 RX ADMIN — FLUOXETINE 20 MG: 20 CAPSULE ORAL at 06:10

## 2017-02-17 RX ADMIN — NAPROXEN 500 MG: 250 TABLET ORAL at 18:30

## 2017-02-17 RX ADMIN — ACETAMINOPHEN 650 MG: 325 TABLET, FILM COATED ORAL at 04:38

## 2017-02-17 RX ADMIN — Medication 10 ML: at 22:35

## 2017-02-17 RX ADMIN — KETOROLAC TROMETHAMINE 30 MG: 30 INJECTION, SOLUTION INTRAMUSCULAR; INTRAVENOUS at 11:59

## 2017-02-17 RX ADMIN — GABAPENTIN 300 MG: 300 CAPSULE ORAL at 22:34

## 2017-02-17 RX ADMIN — DIPHENHYDRAMINE HYDROCHLORIDE 25 MG: 25 CAPSULE ORAL at 15:45

## 2017-02-17 RX ADMIN — APIXABAN 2.5 MG: 2.5 TABLET, FILM COATED ORAL at 09:34

## 2017-02-17 RX ADMIN — MORPHINE SULFATE 8 MG: 10 INJECTION, SOLUTION INTRAMUSCULAR; INTRAVENOUS at 02:38

## 2017-02-17 NOTE — PLAN OF CARE
Problem: Patient Care Overview (Adult)  Goal: Plan of Care Review  Outcome: Ongoing (interventions implemented as appropriate)    02/17/17 0320 02/17/17 0326   Coping/Psychosocial Response Interventions   Plan Of Care Reviewed With --  patient   Patient Care Overview   Progress --  progress towards functional goals is fair   Outcome Evaluation   Outcome Summary/Follow up Plan PT still complains of pain --          Problem: Perioperative Period (Adult)  Goal: Signs and Symptoms of Listed Potential Problems Will be Absent or Manageable (Perioperative Period)  Outcome: Ongoing (interventions implemented as appropriate)    Problem: Knee Replacement, Total (Adult)  Goal: Signs and Symptoms of Listed Potential Problems Will be Absent or Manageable (Knee Replacement, Total)  Outcome: Ongoing (interventions implemented as appropriate)

## 2017-02-17 NOTE — PROGRESS NOTES
Discharge Planning Assessment   Deep     Patient Name: Roseline Steiner  MRN: [id number]  Today's Date: 2/17/2017    Admit Date: 2/14/2017    Discharge Plan       02/17/17 1603    Case Management/Social Work Plan    Plan SS received an order to arrange home health. [facility]  SS faxed the referral to [phone].  SS spoke with [facility] and provided referral.  Nurse please call report to [facility] to [phone].  Pt has a rolling walker and a bedside commode available at home.  Pt will be transported home via private auto.  No other needs at this time.       Patient/Family In Agreement With Plan yes        Expected Discharge Date and Time     Expected Discharge Date Expected Discharge Time    Feb 17, 2017                Camryn Baugh

## 2017-02-17 NOTE — PLAN OF CARE
Problem: Patient Care Overview (Adult)  Goal: Plan of Care Review  Outcome: Ongoing (interventions implemented as appropriate)    Problem: Perioperative Period (Adult)  Goal: Signs and Symptoms of Listed Potential Problems Will be Absent or Manageable (Perioperative Period)  Outcome: Ongoing (interventions implemented as appropriate)    Problem: Knee Replacement, Total (Adult)  Goal: Signs and Symptoms of Listed Potential Problems Will be Absent or Manageable (Knee Replacement, Total)  Outcome: Ongoing (interventions implemented as appropriate)    Problem: Fall Risk (Adult)  Goal: Identify Related Risk Factors and Signs and Symptoms  Outcome: Outcome(s) achieved Date Met:  02/17/17  Goal: Absence of Falls  Outcome: Ongoing (interventions implemented as appropriate)    Problem: Pain, Acute (Adult)  Goal: Identify Related Risk Factors and Signs and Symptoms  Outcome: Outcome(s) achieved Date Met:  02/17/17  Goal: Acceptable Pain Control/Comfort Level  Outcome: Ongoing (interventions implemented as appropriate)

## 2017-02-17 NOTE — PROGRESS NOTES
Discharge Planning Assessment   Deep     Patient Name: Roseline Steiner  MRN: [id number]  Today's Date: 2/17/2017    Admit Date: 2/14/2017       Discharge Plan       02/17/17 1219    Case Management/Social Work Plan    Plan Pt lives at home with spouse, Gregg and plans to return home at discharge. Pt will need home health at discharge. Pt would like to use [facility].  Pt has a rolling walker and a bedside commode available.  SS will continue to follow and will assist a needed.     Patient/Family In Agreement With Plan yes        Expected Discharge Date and Time     Expected Discharge Date Expected Discharge Time    Feb 17, 2017           Camryn Baugh

## 2017-02-17 NOTE — PROGRESS NOTES
"     LOS: 3 days     Chief Complaint:  Right Knee OA S/P TKA    Subjective     Interval History:     Patient is postop day #3 from total knee arthroplasty.  He continues to have pain in the knee.  This has been relatively uncontrolled overnight however pain control has been difficult secondary to hypotension.  Patient was noted to have hemoglobin dropped to 6.4.  She is currently receiving her first unit out of 2 of pack red blood cells.  Patient is B12 and iron deficient.  Patient has had no headaches at this point.  She has been participating with physical therapy.    Objective     Vital Signs  Visit Vitals   • BP (!) (P) 89/46   • Pulse (P) 84   • Temp (P) 98.2 °F (36.8 °C) (Oral)   • Resp (P) 18   • Ht 67\" (170.2 cm)   • Wt 253 lb (115 kg)  Comment: bed   • SpO2 (P) 95%   • Breastfeeding No   • BMI 39.63 kg/m2     Intake & Output (last day)       02/16 0701 - 02/17 0700    P.O. 1840    Total Intake(mL/kg) 1840 (16)    Net +1840         Unmeasured Urine Occurrence 10 x            Physical Exam:     General Appearance:    Alert, cooperative, in no acute distress   Head:    Normocephalic, without obvious abnormality, atraumatic   Eyes:            Lids and lashes normal, conjunctivae and sclerae normal, no   icterus, no pallor, corneas clear, PERRLA   Ears:    Ears appear intact with no abnormalities noted   Throat:   No oral lesions, no thrush, oral mucosa moist   Neck:   No adenopathy, supple, trachea midline, no thyromegaly, no   carotid bruit, no JVD   Lungs:     Clear to auscultation,respirations regular, even and                  unlabored    Heart:    Regular rhythm and normal rate, normal S1 and S2, no            murmur, no gallop, no rub, no click   Chest Wall:    No abnormalities observed   Abdomen:     Normal bowel sounds, no masses, no organomegaly, soft        non-tender, non-distended, no guarding, no rebound                tenderness   Extremities:   RLE in immobilizer, noted RLE edema, no cyanosis, " no             redness   Pulses:   Pulses palpable and equal bilaterally   Skin:   No bleeding, bruising or rash   Lymph nodes:   No palpable adenopathy   Neurologic:   Cranial nerves 2 - 12 grossly intact, sensation intact, DTR       present and equal bilaterally        Results Review:    Lab Results   Component Value Date    WBC 7.48 02/17/2017    HGB 6.4 (C) 02/17/2017    HCT 21.4 (C) 02/17/2017    .9 (H) 02/17/2017     02/17/2017       Lab Results   Component Value Date    GLUCOSE 125 (H) 02/17/2017    BUN 8 02/17/2017    CREATININE 0.81 02/17/2017    EGFRIFNONA 73 02/17/2017    BCR 9.9 02/17/2017     02/17/2017    K 4.0 02/17/2017     02/17/2017    CO2 30.1 02/17/2017    CALCIUM 8.7 02/17/2017    ALBUMIN 4.10 06/28/2016    LABIL2 1.5 06/28/2016    AST 15 06/28/2016    ALT 17 06/28/2016     Lab Results   Component Value Date    INR 1.89 10/10/2014    INR 1.54 10/09/2014    INR 1.10 10/08/2014       No results found for: POCGLU       Medication Review:     Current Facility-Administered Medications:   •  apixaban (ELIQUIS) tablet 2.5 mg, 2.5 mg, Oral, Q12H, Wilfred Randle MD, 2.5 mg at 02/16/17 2125  •  butorphanol (STADOL) nasal spray 1 spray, 1 spray, Nasal, PRN, Wilfred Randle MD  •  dihydroergotamine (MIGRANAL) nasal spray 1 spray, 1 spray, Nasal, PRN, Wilfred Randle MD  •  docusate sodium (COLACE) capsule 100 mg, 100 mg, Oral, BID PRN, Wilfred Randle MD  •  ferrous sulfate tablet 325 mg, 325 mg, Oral, BID With Meals, DESTINEY Pablo, 325 mg at 02/16/17 1803  •  FLUoxetine (PROzac) capsule 20 mg, 20 mg, Oral, Q8H, Wilfred Randle MD, 20 mg at 02/16/17 2125  •  gabapentin (NEURONTIN) capsule 300 mg, 300 mg, Oral, Q12H, Wilfred Randle MD, 300 mg at 02/16/17 2125  •  ketorolac (TORADOL) injection 30 mg, 30 mg, Intravenous, Q6H PRN, Wilfred Randle MD, 30 mg at 02/16/17 2228  •  levothyroxine (SYNTHROID, LEVOTHROID) tablet 100 mcg,  100 mcg, Oral, Q AM, Wilfred Randle MD, 100 mcg at 02/16/17 0600  •  morphine injection 4 mg, 4 mg, Intravenous, Q3H PRN, Wilfred Randle MD, 4 mg at 02/15/17 1934  •  Morphine injection 8 mg, 8 mg, Intravenous, Q3H PRN, Wilfred Randle MD, 8 mg at 02/17/17 0238  •  naproxen (NAPROSYN) tablet 500 mg, 500 mg, Oral, BID With Meals, Wilfred Randle MD, 500 mg at 02/16/17 1803  •  nortriptyline (PAMELOR) capsule 50 mg, 50 mg, Oral, Nightly, Wilfred Randle MD, 50 mg at 02/15/17 2201  •  ondansetron (ZOFRAN) tablet 4 mg, 4 mg, Oral, Q6H PRN, Wilfred Randle MD, 4 mg at 02/17/17 0250  •  oxyCODONE-acetaminophen (PERCOCET)  MG per tablet 1 tablet, 1 tablet, Oral, Q4H PRN, Samuel Duane Kreis, MD, 1 tablet at 02/16/17 1448  •  verapamil SR (CALAN-SR) CR tablet 180 mg, 180 mg, Oral, Q24H, Wilfred Randle MD, 180 mg at 02/16/17 0812  •  vitamin B-12 (CYANOCOBALAMIN) tablet 1,000 mcg, 1,000 mcg, Oral, Daily, Samuel Duane Kreis, MD, 1,000 mcg at 02/16/17 0958      Assessment/Plan   Primary osteoarthritis of right knee S/P TKA  Migraine Headaches  Insomnia  Acute Blood Loss Anemia  B12 def/Iron Def    Morphine + percocet prn pain    2 units PRBC, repeat H&H following    Continue gabapentin, nortriptyline and gabapentin for migraine prophylaxis.    Eliquis for DVT prophylaxis     Continue with PT    Monitor blood counts, transfuse <7.0    Continue B12 and Iron supplementation     CBC, BMP in the AM  DESTINEY Pablo  02/17/17  5:52 AM     She was seen this morning by me.  I do agree with the above note.  Hemoglobin dropped to 6.6.  Stool for occult blood pending.  Transfer to units of packed red blood cells.  Continue current pain management.  Her blood pressure has been a little bit low.  Hold verapamil if blood pressure remains low.  She takes this for her migraine headache prophylaxis    Samuel Duane Kreis, MD  02/17/17  8:07 AM

## 2017-02-17 NOTE — DISCHARGE PLACEMENT REQUEST
"Roseline Guerrero (56 y.o. Female)     Date of Birth Social Security Number Address Home Phone MRN    1960  [address]   [facility] [address] [phone] [id number]    Protestant Marital Status          Yazdanism        Admission Date Admission Type Admitting Provider Attending Provider Department, Room/Bed    2/14/17 Elective Wilfred Randle MD Belhasen, Ronald Keith, MD [address], 210/1S    Discharge Date Discharge Disposition Discharge Destination                      Attending Provider: Wilfred Randle MD     Allergies:  Codeine    Isolation:  None   Infection:  None   Code Status:  FULL    Ht:  67\" (170.2 cm)   Wt:  253 lb (115 kg)    Admission Cmt:  None   Principal Problem:  None                Active Insurance as of 2/14/2017     Primary Coverage     Payor Plan Insurance Group Employer/Plan Group    [employer] [id number]     Payor Plan Address Payor Plan Phone Number Effective From Effective To    [address] [phone] 1/1/2015     [place] [address]       Subscriber Name Subscriber Birth Date Member ID       ROSELINE GUERRERO 1960 [id number]                 Emergency Contacts      (Rel.) Home Phone Work Phone Mobile Phone    Gely Ko (Sister) [phone] -- --    Wilfred Guerrero (Spouse) [phone] -- --            Emergency Contact Information     Name Relation Home Work Mobile    Gely Ko Sister [phone]      Wilfred Guerrero Spouse [phone]            Insurance Information                [facility] [employer] EMPLOYEE Phone: [phone]    Subscriber: Roseline Guerrero Subscriber#: [id number]    Group#: [id number] Precert#:              History & Physical      Wilfred Randle MD at 2/1/2017  9:20 AM          History and Physical     Patient: Roseline Guerrero  YOB: 1960  Date of encounter: 02/01/2017        History of Present Illness: "   Roseline Steiner is a 56 y.o. female who presents here today with complaints of bilateral knee pain. She is status post total knee arthroplasty of the left knee. She states the left knee is doing reasonably well however she's had increased pain in the right knee. She states that in October she fell and landed directly on the knee. Since that fall her right knee has been giving out on her and causing her to fall. She is complaining of significant right knee pain with instability. She states it's worse with prolonged walking.  She's previously had Synvisc injections with no improvement. She is most recently had a cortisone injection without any significant improvement in her knee pain. She denies paresthesias.     PMH:       Patient Active Problem List   Diagnosis   • Migraine with status migrainosus   • Arthralgia of left hip   • Greater trochanteric bursitis   • Primary osteoarthritis of right knee       Medical History         Past Medical History   Diagnosis Date   • Disease of thyroid gland     • Fibromyalgia     • Fibromyalgia     • GERD (gastroesophageal reflux disease)     • Insomnia     • Migraine     • Nephrolithiasis                 PSH:   Surgical History           Past Surgical History   Procedure Laterality Date   • Hysterectomy       • Cholecystectomy       • Gastric banding       • Knee surgery       • Hand surgery       • Carpal tunnel release Bilateral     • Cranial neurostimulator insertion/replacement   01/09/2017       for migraines            Allergies:           Allergies   Allergen Reactions   • Codeine           Medications:      Current Outpatient Prescriptions:   • aspirin-acetaminophen-caffeine (EXCEDRIN MIGRAINE) 250-250-65 MG per tablet, Take 1 tablet by mouth Every 6 (Six) Hours As Needed for headaches., Disp: , Rfl:   • nortriptyline (PAMELOR) 50 MG capsule, Take 50 mg by mouth Every Night., Disp: , Rfl:   • benzonatate (TESSALON) 200 MG capsule, , Disp: , Rfl: 0  • butorphanol (STADOL)  10 MG/ML nasal spray, , Disp: , Rfl: 2  • diclofenac (VOLTAREN) 75 MG EC tablet, , Disp: , Rfl: 1  • dihydroergotamine (MIGRANAL) 4 MG/ML nasal spray, , Disp: , Rfl: 2  • FLUoxetine (PROzac) 20 MG capsule, Take 20 mg by mouth 3 (three) times a day., Disp: , Rfl:   • gabapentin (NEURONTIN) 300 MG capsule, Take 300 mg by mouth 2 (two) times a day., Disp: , Rfl:   • levothyroxine (SYNTHROID, LEVOTHROID) 100 MCG tablet, Take 100 mcg by mouth daily., Disp: , Rfl:   • promethazine (PHENERGAN) 25 MG tablet, , Disp: , Rfl: 0  • SUMAtriptan (IMITREX) 100 MG tablet, Take 100 mg by mouth every 2 (two) hours as needed for migraine., Disp: , Rfl:   • verapamil PM (VERELAN PM) 180 MG 24 hr capsule, , Disp: , Rfl: 2     Social History:      Social History          Occupational History   • Not on file.           Social History Main Topics   • Smoking status: Never Smoker   • Smokeless tobacco: Not on file   • Alcohol use No   • Drug use: No   • Sexual activity: Not on file      Social History      Social History Narrative         Family History:            Family History   Problem Relation Age of Onset   • Cancer Father     • Hypertension Father     • Hyperlipidemia Mother           Review of Systems:   Pertinent positives are mentioned in the HPI. All other systems were reviewed and found to be negative.  Review of Systems   Constitutional: Negative.   HENT: Negative.   Eyes: Negative.   Respiratory: Negative.   Cardiovascular: Negative.   Gastrointestinal: Negative.   Endocrine: Negative.   Genitourinary: Negative.   Musculoskeletal:   Pertinent positives listed in HPI.  Skin: Negative.   Allergic/Immunologic: Negative.   Neurological: Negative.   Hematological: Negative.   Psychiatric/Behavioral: Negative.            Physical Exam:   General Appearance:  56 y.o. female cooperative, in no acute distress. Alert and oriented x 3,        Vitals        Vitals:     02/01/17 0943   BP: 118/73   BP Location: Right arm   Patient  "Position: Sitting   Pulse: 98   Weight: 230 lb (104 kg)   Height: 67\" (170.2 cm)          HEENT: Unremarkable  Neck: Supple without lymphadenopathy.  Chest: Clear to ascultation bilaterally. Normal respiratory effort.  Heart: Regular rate and rhythm. No murmurs noted.  Skin:  Warm and dry without any rash.  Musculoskeletal:  Upper Extremities: Unremarkable.   Lower Extremities: Examination of the right knee reveals mild effusion. She has mild medial joint line tenderness. She is full range of motion. No instability with varus or valgus stressing. Neurovascular status is intact.        Radiology:    AP and lateral views of the bilateral knees were reviewed. The right knee does reveal osteoarthritic changes with slight worsening compared to x-rays obtained for years ago. The left knee reveals total knee arthroplasty with prosthesis in good alignment and position, there is no evidence of loosening of the prosthesis     Assessment      ICD-10-CM ICD-9-CM   1. Primary osteoarthritis of right knee M17.11 715.16         Plan:  A 56-year-old female with primary osteoarthritis of the right knee. She is no longer responding to conservative treatment including Synvisc injections, cortisone injections, and anti-inflammatories. Her pain is getting progressively worse and now causing frequent falls. Given this we have discussed proceeding with a total knee arthroplasty. We discussed the risks, benefits, and future outcomes of surgery. She accepts these risks and is agreeable to surgery. She is a 28 been cleared through her primary care practitioner. We will tentatively place her on the schedule for February 21, 2017 at [facility].     Written by, Camila CABELLO, acting as a scribe for Dr. Jer Beavers I, Wilfred Randle MD, personally performed the services described in this documentation as scribed by the above named individual in my presence, and it is both accurate and complete. 2/1/2017 " 7:28 PM     This document was signed by Wilfred Randle M.D.  February 1, 2017 7:28 PM          Electronically signed by Wilfred Randle MD at 2/1/2017  7:29 PM      Wilfred Randle MD at 2/1/2017  9:20 AM          History and Physical    Patient: Roseline Steiner  YOB: 1960  Date of encounter: 02/01/2017      History of Present Illness:   Roseline Steiner is a 56 y.o. female who presents here today with complaints of bilateral knee pain. She is status post total knee arthroplasty of the left knee. She states the left knee is doing reasonably well however she's had increased pain in the right knee. She states that in October she fell and landed directly on the knee. Since that fall her right knee has been giving out on her and causing her to fall. She is complaining of significant right knee pain with instability. She states it's worse with prolonged walking.  She's previously had Synvisc injections with no improvement.  She is most recently had a cortisone injection without any significant improvement in her knee pain.  She denies paresthesias.    PMH:   Patient Active Problem List   Diagnosis   • Migraine with status migrainosus   • Arthralgia of left hip   • Greater trochanteric bursitis   • Primary osteoarthritis of right knee     Past Medical History   Diagnosis Date   • Disease of thyroid gland    • Fibromyalgia    • Fibromyalgia    • GERD (gastroesophageal reflux disease)    • Insomnia    • Migraine    • Nephrolithiasis          PSH:  Past Surgical History   Procedure Laterality Date   • Hysterectomy     • Cholecystectomy     • Gastric banding     • Knee surgery     • Hand surgery     • Carpal tunnel release Bilateral    • Cranial neurostimulator insertion/replacement  01/09/2017     for migraines       Allergies:     Allergies   Allergen Reactions   • Codeine        Medications:     Current Outpatient Prescriptions:   •  aspirin-acetaminophen-caffeine (EXCEDRIN MIGRAINE) 250-250-65  MG per tablet, Take 1 tablet by mouth Every 6 (Six) Hours As Needed for headaches., Disp: , Rfl:   •  nortriptyline (PAMELOR) 50 MG capsule, Take 50 mg by mouth Every Night., Disp: , Rfl:   •  benzonatate (TESSALON) 200 MG capsule, , Disp: , Rfl: 0  •  butorphanol (STADOL) 10 MG/ML nasal spray, , Disp: , Rfl: 2  •  diclofenac (VOLTAREN) 75 MG EC tablet, , Disp: , Rfl: 1  •  dihydroergotamine (MIGRANAL) 4 MG/ML nasal spray, , Disp: , Rfl: 2  •  FLUoxetine (PROzac) 20 MG capsule, Take 20 mg by mouth 3 (three) times a day., Disp: , Rfl:   •  gabapentin (NEURONTIN) 300 MG capsule, Take 300 mg by mouth 2 (two) times a day., Disp: , Rfl:   •  levothyroxine (SYNTHROID, LEVOTHROID) 100 MCG tablet, Take 100 mcg by mouth daily., Disp: , Rfl:   •  promethazine (PHENERGAN) 25 MG tablet, , Disp: , Rfl: 0  •  SUMAtriptan (IMITREX) 100 MG tablet, Take 100 mg by mouth every 2 (two) hours as needed for migraine., Disp: , Rfl:   •  verapamil PM (VERELAN PM) 180 MG 24 hr capsule, , Disp: , Rfl: 2    Social History:     Social History     Occupational History   • Not on file.     Social History Main Topics   • Smoking status: Never Smoker   • Smokeless tobacco: Not on file   • Alcohol use No   • Drug use: No   • Sexual activity: Not on file      Social History     Social History Narrative       Family History:     Family History   Problem Relation Age of Onset   • Cancer Father    • Hypertension Father    • Hyperlipidemia Mother        Review of Systems:   Pertinent positives are mentioned in the HPI. All other systems were reviewed and found to be negative.  Review of Systems   Constitutional: Negative.    HENT: Negative.    Eyes: Negative.    Respiratory: Negative.    Cardiovascular: Negative.    Gastrointestinal: Negative.    Endocrine: Negative.    Genitourinary: Negative.    Musculoskeletal:        Pertinent positives listed in HPI.     Skin: Negative.    Allergic/Immunologic: Negative.    Neurological: Negative.    Hematological:  "Negative.    Psychiatric/Behavioral: Negative.          Physical Exam:   General Appearance:    56 y.o. female  cooperative, in no acute distress.  Alert and oriented x 3,                   Vitals:    02/01/17 0943   BP: 118/73   BP Location: Right arm   Patient Position: Sitting   Pulse: 98   Weight: 230 lb (104 kg)   Height: 67\" (170.2 cm)          HEENT: Unremarkable      Neck: Supple without lymphadenopathy.      Chest: Clear to ascultation bilaterally. Normal respiratory effort.      Heart: Regular rate and rhythm. No murmurs noted.      Skin:  Warm and dry without any rash.      Musculoskeletal:  Upper Extremities: Unremarkable.   Lower Extremities: Examination of the right knee reveals mild effusion. She has mild medial joint line tenderness. She is full range of motion. No instability with varus or valgus stressing. Neurovascular status is intact.      Radiology:     AP and lateral views of the bilateral knees were reviewed. The right knee does reveal osteoarthritic changes with slight worsening compared to x-rays obtained for years ago. The left knee reveals total knee arthroplasty with prosthesis in good alignment and position, there is no evidence of loosening of the prosthesis    Assessment    ICD-10-CM ICD-9-CM   1. Primary osteoarthritis of right knee M17.11 715.16       Plan:  A 56-year-old female with primary osteoarthritis of the right knee.  She is no longer responding to conservative treatment including Synvisc injections, cortisone injections, and anti-inflammatories.  Her pain is getting progressively worse and now causing frequent falls.  Given this we have discussed proceeding with a total knee arthroplasty.  We discussed the risks, benefits, and future outcomes of surgery.  She accepts these risks and is agreeable to surgery.  She is a 28 been cleared through her primary care practitioner.  We will tentatively place her on the schedule for February 21, 2017 at [facility] " Deep.    Written by, Camila CABELLO, acting as a scribe for Dr. Jer Beavers I, Wilfred Randle MD, personally performed the services described in this documentation as scribed by the above named individual in my presence, and it is both accurate and complete.  2/1/2017  7:28 PM    This document was signed by Wilfred Ranlde M.D.  February 1, 2017 7:28 PM         Electronically signed by Wilfred Randle MD at 2/1/2017  7:37 PM      Wilfred Randle MD at 2/14/2017  7:23 AM          H&P reviewed. The patient was examined and there are no changes to the H&P.     Electronically signed by Wilfred Randle MD at 2/14/2017  7:23 AM      Wilfred Randle MD at 2/14/2017  7:25 AM          H&P reviewed. The patient was examined and there are no changes to the H&P.     Electronically signed by Wilfred Randle MD at 2/14/2017  7:25 AM        Vital Signs (last 24 hours)       02/16 0700  -  02/17 0659 02/17 0700  -  02/17 1556   Most Recent    Temp (°F) 98.2 -  98.7    98.1 -  98.3     98.1 (36.7)    Heart Rate 74 -  92    82 -  94     89    Resp 18 -  22    18 -  20     20    BP (!)89/42 -  131/50    90/60 -  116/53     99/50    SpO2 (%) 92 -  99       95          Intake & Output (last day)       02/16 0701 - 02/17 0700 02/17 0701 - 02/18 0700    P.O. 1840 600    I.V. (mL/kg)      Blood  340    Total Intake(mL/kg) 1840 (16) 940 (8.2)    Urine (mL/kg/hr)      Other      Total Output        Net +1840 +940          Unmeasured Urine Occurrence 10 x         Hospital Medications (active)       Dose Frequency Start End    acetaminophen (TYLENOL) 325 MG tablet  - ADS Override Pull   2/17/2017 2/17/2017    Notes to Pharmacy: Created by cabinet override    acetaminophen (TYLENOL) tablet 650 mg 650 mg Once 2/17/2017 2/17/2017    Sig - Route: Take 2 tablets by mouth 1 (One) Time. - Oral    apixaban (ELIQUIS) tablet 2.5 mg 2.5 mg Every 12 Hours Scheduled 2/15/2017     Sig - Route:  Take 1 tablet by mouth Every 12 (Twelve) Hours. - Oral    butorphanol (STADOL) nasal spray 1 spray 1 spray As Needed 2/14/2017     Sig - Route: 1 spray into each nostril As Needed for mild pain (1-3) or moderate pain (4-6) (migraine, take if migranol doesn't work). - Nasal    Notes to Pharmacy: Patient has in car, can get if needed.    Non-formulary Exception Code: Patient supplied medication    dihydroergotamine (MIGRANAL) nasal spray 1 spray 1 spray As Needed 2/14/2017     Sig - Route: 1 spray into each nostril As Needed for migraine. - Nasal    Notes to Pharmacy: Patient has in car if needed.    Non-formulary Exception Code: Patient supplied medication    diphenhydrAMINE (BENADRYL) 25 mg capsule  - ADS Override Pull   2/17/2017 2/17/2017    Notes to Pharmacy: Created by cabinet override    diphenhydrAMINE (BENADRYL) capsule 25 mg 25 mg Once 2/17/2017 2/17/2017    Sig - Route: Take 1 capsule by mouth 1 (One) Time. - Oral    docusate sodium (COLACE) capsule 100 mg 100 mg 2 Times Daily PRN 2/14/2017     Sig - Route: Take 1 capsule by mouth 2 (Two) Times a Day As Needed for constipation. - Oral    ferrous sulfate tablet 325 mg 325 mg 2 Times Daily With Meals 2/16/2017     Sig - Route: Take 1 tablet by mouth 2 (Two) Times a Day With Meals. - Oral    Cosign for Ordering: Required by Samuel Duane Kreis, MD    FLUoxetine (PROzac) capsule 20 mg 20 mg Every 8 Hours Scheduled 2/14/2017     Sig - Route: Take 1 capsule by mouth Every 8 (Eight) Hours. - Oral    gabapentin (NEURONTIN) capsule 300 mg 300 mg Every 12 Hours Scheduled 2/14/2017     Sig - Route: Take 1 capsule by mouth Every 12 (Twelve) Hours. - Oral    ketorolac (TORADOL) injection 30 mg 30 mg Every 6 Hours PRN 2/15/2017 2/20/2017    Sig - Route: Infuse 30 mg into a venous catheter Every 6 (Six) Hours As Needed for moderate pain (4-6). - Intravenous    levothyroxine (SYNTHROID, LEVOTHROID) tablet 100 mcg 100 mcg Every Early Morning 2/15/2017     Sig - Route: Take 1  tablet by mouth Every Morning. - Oral    lidocaine (XYLOCAINE) 1 % injection 20 mL 20 mL Once 2/17/2017 2/17/2017    Sig - Route: Inject 20 mL into the skin 1 (One) Time. - Intradermal    morphine injection 4 mg 4 mg Every 3 Hours PRN 2/15/2017 2/25/2017    Sig - Route: Infuse 1 mL into a venous catheter Every 3 (Three) Hours As Needed for moderate pain (4-6). - Intravenous    Morphine injection 8 mg 8 mg Every 3 Hours PRN 2/15/2017 2/25/2017    Sig - Route: Infuse 0.8 mL into a venous catheter Every 3 (Three) Hours As Needed for severe pain (7-10). - Intravenous    naproxen (NAPROSYN) tablet 500 mg 500 mg 2 Times Daily With Meals 2/14/2017     Sig - Route: Take 2 tablets by mouth 2 (Two) Times a Day With Meals. - Oral    nortriptyline (PAMELOR) capsule 50 mg 50 mg Nightly 2/14/2017     Sig - Route: Take 2 capsules by mouth Every Night. - Oral    ondansetron (ZOFRAN) tablet 4 mg 4 mg Every 6 Hours PRN 2/14/2017     Sig - Route: Take 1 tablet by mouth Every 6 (Six) Hours As Needed for nausea or vomiting. - Oral    oxyCODONE-acetaminophen (PERCOCET)  MG per tablet 1 tablet 1 tablet Every 4 Hours PRN 2/15/2017 2/25/2017    Sig - Route: Take 1 tablet by mouth Every 4 (Four) Hours As Needed for severe pain (7-10). - Oral    sodium chloride 0.9 % flush 10 mL 10 mL Every 12 Hours Scheduled 2/17/2017     Sig - Route: 10 mL by Intracatheter route Every 12 (Twelve) Hours. - Intracatheter    Cosign for Ordering: Required by Wilfred Randle MD    sodium chloride 0.9 % flush 10 mL 10 mL As Needed 2/17/2017     Sig - Route: 10 mL by Intracatheter route As Needed for line care (After Medication Administration). - Intracatheter    Cosign for Ordering: Required by Wilfred Randle MD    sodium chloride 0.9 % infusion  - ADS Override Pull   2/17/2017 2/18/2017    Notes to Pharmacy: Created by cabinet override    verapamil SR (CALAN-SR) CR tablet 180 mg 180 mg Every 24 Hours Scheduled 2/15/2017     Sig - Route: Take  1 tablet by mouth Daily. - Oral    vitamin B-12 (CYANOCOBALAMIN) tablet 1,000 mcg 1,000 mcg Daily 2/16/2017     Sig - Route: Take 1 tablet by mouth Daily. - Oral    sodium chloride 0.9 % infusion (Discontinued) 100 mL/hr Once 2/17/2017 2/17/2017    Sig - Route: Infuse 100 mL/hr into a venous catheter 1 (One) Time. - Intravenous            Lab Results (last 24 hours)     Procedure Component Value Units Date/Time    Basic Metabolic Panel [[id number]]  (Abnormal) Collected:  02/17/17 0048    Specimen:  Blood Updated:  02/17/17 0126     Glucose 125 (H) mg/dL      BUN 8 mg/dL      Creatinine 0.81 mg/dL      Sodium 138 mmol/L      Potassium 4.0 mmol/L      Chloride 106 mmol/L      CO2 30.1 mmol/L      Calcium 8.7 mg/dL      eGFR Non African Amer 73 mL/min/1.73      BUN/Creatinine Ratio 9.9      Anion Gap 1.9 (L) mmol/L     Narrative:       GFR Normal >60  Chronic Kidney Disease <60  Kidney Failure <15    Osmolality, Calculated [[id number]]  (Normal) Collected:  02/17/17 0048    Specimen:  Blood Updated:  02/17/17 0126     Osmolality Calc 275.5 mOsm/kg     CBC & Differential [[id number]] Collected:  02/17/17 0138    Specimen:  Blood Updated:  02/17/17 0201    Narrative:       The following orders were created for panel order CBC & Differential.  Procedure                               Abnormality         Status                     ---------                               -----------         ------                     CBC Auto Differential[[id number]]         Abnormal            Final result                 Please view results for these tests on the individual orders.    CBC Auto Differential [[id number]]  (Abnormal) Collected:  02/17/17 0138    Specimen:  Blood Updated:  02/17/17 0201     WBC 7.48 10*3/mm3      RBC 2.12 (L) 10*6/mm3      Hemoglobin 6.4 (C) g/dL      Hematocrit 21.4 (C) %      .9 (H) fL      MCH 30.2 pg      MCHC 29.9 (L) g/dL      RDW 14.3 %      RDW-SD 49.4 fl      MPV 10.1 (H) fL      Platelets 176 10*3/mm3       Neutrophil % 54.1 %      Lymphocyte % 28.5 %      Monocyte % 12.4 (H) %      Eosinophil % 4.1 %      Basophil % 0.4 %      Immature Grans % 0.5 %      Neutrophils, Absolute 4.04 10*3/mm3      Lymphocytes, Absolute 2.13 10*3/mm3      Monocytes, Absolute 0.93 (H) 10*3/mm3      Eosinophils, Absolute 0.31 10*3/mm3      Basophils, Absolute 0.03 10*3/mm3      Immature Grans, Absolute 0.04 (H) 10*3/mm3         Imaging Results (last 24 hours)     ** No results found for the last 24 hours. **           Operative/Procedure Notes (most recent note)      Wilfred Randle MD at 2/14/2017  9:56 AM  Version 1 of 1         TOTAL KNEE ARTHROPLASTY  Procedure Note    Roseline Steiner  2/14/2017    Pre-op Diagnosis:   Preop testing [Z01.818]  Primary osteoarthritis of right knee [M17.11]    Post-op Diagnosis:     Post-Op Diagnosis Codes:     * Preop testing [Z01.818]     * Primary osteoarthritis of right knee [M17.11]    Procedure/CPT® Codes:      Procedure(s):  TOTAL KNEE ARTHROPLASTY right    Surgeon(s):  Wilfred Randle MD    Anesthesia: general/fnb    Staff:   Circulator: Marcelino Waller RN  Scrub Person: Rebecca Fuller  Vendor Representative: Casper Causey  Assistant: Anibal Matthews CSA  Other: Dominick Sexton    Estimated Blood Loss: 200 mL    Specimens:                * No specimens in log *      Drains:   Y Collapsible Closed Device 1 and 2 02/14/17 0935 (Active)       Urethral Catheter 02/14/17 0743 100% silicone 16 10 10 (Active)           Findings:     Complications:       Wilfred Randle MD     Date: 2/14/2017  Time: 9:56 AM         Electronically signed by Wilfred Randle MD at 2/14/2017  9:57 AM           Physician Progress Notes (last 24 hours) (Notes from 2/16/2017  3:56 PM through 2/17/2017  3:56 PM)      Samuel Duane Kreis, MD at 2/17/2017  5:51 AM  Version 2 of 2              LOS: 3 days     Chief Complaint:  Right Knee OA S/P TKA    Subjective     Interval History:     Patient is postop day  "#3 from total knee arthroplasty.  He continues to have pain in the knee.  This has been relatively uncontrolled overnight however pain control has been difficult secondary to hypotension.  Patient was noted to have hemoglobin dropped to 6.4.  She is currently receiving her first unit out of 2 of pack red blood cells.  Patient is B12 and iron deficient.  Patient has had no headaches at this point.  She has been participating with physical therapy.    Objective     Vital Signs  Visit Vitals   • BP (!) (P) 89/46   • Pulse (P) 84   • Temp (P) 98.2 °F (36.8 °C) (Oral)   • Resp (P) 18   • Ht 67\" (170.2 cm)   • Wt 253 lb (115 kg)  Comment: bed   • SpO2 (P) 95%   • Breastfeeding No   • BMI 39.63 kg/m2     Intake & Output (last day)       02/16 0701 - 02/17 0700    P.O. 1840    Total Intake(mL/kg) 1840 (16)    Net +1840         Unmeasured Urine Occurrence 10 x            Physical Exam:     General Appearance:    Alert, cooperative, in no acute distress   Head:    Normocephalic, without obvious abnormality, atraumatic   Eyes:            Lids and lashes normal, conjunctivae and sclerae normal, no   icterus, no pallor, corneas clear, PERRLA   Ears:    Ears appear intact with no abnormalities noted   Throat:   No oral lesions, no thrush, oral mucosa moist   Neck:   No adenopathy, supple, trachea midline, no thyromegaly, no   carotid bruit, no JVD   Lungs:     Clear to auscultation,respirations regular, even and                  unlabored    Heart:    Regular rhythm and normal rate, normal S1 and S2, no            murmur, no gallop, no rub, no click   Chest Wall:    No abnormalities observed   Abdomen:     Normal bowel sounds, no masses, no organomegaly, soft        non-tender, non-distended, no guarding, no rebound                tenderness   Extremities:   RLE in immobilizer, noted RLE edema, no cyanosis, no             redness   Pulses:   Pulses palpable and equal bilaterally   Skin:   No bleeding, bruising or rash   Lymph " nodes:   No palpable adenopathy   Neurologic:   Cranial nerves 2 - 12 grossly intact, sensation intact, DTR       present and equal bilaterally        Results Review:    Lab Results   Component Value Date    WBC 7.48 02/17/2017    HGB 6.4 (C) 02/17/2017    HCT 21.4 (C) 02/17/2017    .9 (H) 02/17/2017     02/17/2017       Lab Results   Component Value Date    GLUCOSE 125 (H) 02/17/2017    BUN 8 02/17/2017    CREATININE 0.81 02/17/2017    EGFRIFNONA 73 02/17/2017    BCR 9.9 02/17/2017     02/17/2017    K 4.0 02/17/2017     02/17/2017    CO2 30.1 02/17/2017    CALCIUM 8.7 02/17/2017    ALBUMIN 4.10 06/28/2016    LABIL2 1.5 06/28/2016    AST 15 06/28/2016    ALT 17 06/28/2016     Lab Results   Component Value Date    INR 1.89 10/10/2014    INR 1.54 10/09/2014    INR 1.10 10/08/2014       No results found for: POCGLU       Medication Review:     Current Facility-Administered Medications:   •  apixaban (ELIQUIS) tablet 2.5 mg, 2.5 mg, Oral, Q12H, Wilfred Randle MD, 2.5 mg at 02/16/17 2125  •  butorphanol (STADOL) nasal spray 1 spray, 1 spray, Nasal, PRN, Wilfred Randle MD  •  dihydroergotamine (MIGRANAL) nasal spray 1 spray, 1 spray, Nasal, PRN, Wilfred Randle MD  •  docusate sodium (COLACE) capsule 100 mg, 100 mg, Oral, BID PRN, Wilfred Randle MD  •  ferrous sulfate tablet 325 mg, 325 mg, Oral, BID With Meals, DESTINYE Pablo, 325 mg at 02/16/17 1803  •  FLUoxetine (PROzac) capsule 20 mg, 20 mg, Oral, Q8H, Wilfred Randle MD, 20 mg at 02/16/17 2125  •  gabapentin (NEURONTIN) capsule 300 mg, 300 mg, Oral, Q12H, Wilfred Randle MD, 300 mg at 02/16/17 2125  •  ketorolac (TORADOL) injection 30 mg, 30 mg, Intravenous, Q6H PRN, Wilfred Randle MD, 30 mg at 02/16/17 2228  •  levothyroxine (SYNTHROID, LEVOTHROID) tablet 100 mcg, 100 mcg, Oral, Q AM, Wilfred Randle MD, 100 mcg at 02/16/17 0600  •  morphine injection 4 mg, 4 mg, Intravenous,  Q3H PRN, Wilfred Randle MD, 4 mg at 02/15/17 1934  •  Morphine injection 8 mg, 8 mg, Intravenous, Q3H PRN, Wilfred Randle MD, 8 mg at 02/17/17 0238  •  naproxen (NAPROSYN) tablet 500 mg, 500 mg, Oral, BID With Meals, Wilfred Randle MD, 500 mg at 02/16/17 1803  •  nortriptyline (PAMELOR) capsule 50 mg, 50 mg, Oral, Nightly, Wilfred Randle MD, 50 mg at 02/15/17 2201  •  ondansetron (ZOFRAN) tablet 4 mg, 4 mg, Oral, Q6H PRN, Wilfred Randle MD, 4 mg at 02/17/17 0250  •  oxyCODONE-acetaminophen (PERCOCET)  MG per tablet 1 tablet, 1 tablet, Oral, Q4H PRN, Samuel Duane Kreis, MD, 1 tablet at 02/16/17 1448  •  verapamil SR (CALAN-SR) CR tablet 180 mg, 180 mg, Oral, Q24H, Wilfred Randle MD, 180 mg at 02/16/17 0812  •  vitamin B-12 (CYANOCOBALAMIN) tablet 1,000 mcg, 1,000 mcg, Oral, Daily, Samuel Duane Kreis, MD, 1,000 mcg at 02/16/17 0958      Assessment&#47;Plan osteoarthritis of right knee S/P TKA  Migraine Headaches  Insomnia  Acute Blood Loss Anemia  B12 def/Iron Def  + percocet prn pain    units PRBC, repeat H&H following    Continue gabapentin, nortriptyline and gabapentin for migraine prophylaxis.    Eliquis for DVT prophylaxis     Continue with PT    Monitor blood counts, transfuse <7.0  B12 and Iron supplementation     CBC, BMP in the AM  DESTINEY Pablo  02/17/17  5:52 AM   was seen this morning by me.  I do agree with the above note.  Hemoglobin dropped to 6.6.  Stool for occult blood pending.  Transfer to units of packed red blood cells.  Continue current pain management.  Her blood pressure has been a little bit low.  Hold verapamil if blood pressure remains low.  She takes this for her migraine headache prophylaxis    Samuel Duane Kreis, MD  02/17/17  8:07 AM                     Electronically signed by Samuel Duane Kreis, MD at 2/17/2017  8:07 AM      DESTINEY Pablo at 2/17/2017  5:51 AM  Version 1 of 2              LOS: 3 days     Chief Complaint:   "Right Knee OA S/P TKA    Subjective     Interval History:     Patient is postop day #3 from total knee arthroplasty.  He continues to have pain in the knee.  This has been relatively uncontrolled overnight however pain control has been difficult secondary to hypotension.  Patient was noted to have hemoglobin dropped to 6.4.  She is currently receiving her first unit out of 2 of pack red blood cells.  Patient is B12 and iron deficient.  Patient has had no headaches at this point.  She has been participating with physical therapy.    Objective     Vital Signs  Visit Vitals   • BP (!) (P) 89/46   • Pulse (P) 84   • Temp (P) 98.2 °F (36.8 °C) (Oral)   • Resp (P) 18   • Ht 67\" (170.2 cm)   • Wt 253 lb (115 kg)  Comment: bed   • SpO2 (P) 95%   • Breastfeeding No   • BMI 39.63 kg/m2     Intake & Output (last day)       02/16 0701 - 02/17 0700    P.O. 1840    Total Intake(mL/kg) 1840 (16)    Net +1840         Unmeasured Urine Occurrence 10 x            Physical Exam:     General Appearance:    Alert, cooperative, in no acute distress   Head:    Normocephalic, without obvious abnormality, atraumatic   Eyes:            Lids and lashes normal, conjunctivae and sclerae normal, no   icterus, no pallor, corneas clear, PERRLA   Ears:    Ears appear intact with no abnormalities noted   Throat:   No oral lesions, no thrush, oral mucosa moist   Neck:   No adenopathy, supple, trachea midline, no thyromegaly, no   carotid bruit, no JVD   Lungs:     Clear to auscultation,respirations regular, even and                  unlabored    Heart:    Regular rhythm and normal rate, normal S1 and S2, no            murmur, no gallop, no rub, no click   Chest Wall:    No abnormalities observed   Abdomen:     Normal bowel sounds, no masses, no organomegaly, soft        non-tender, non-distended, no guarding, no rebound                tenderness   Extremities:   RLE in immobilizer, noted RLE edema, no cyanosis, no             redness   Pulses:   " Pulses palpable and equal bilaterally   Skin:   No bleeding, bruising or rash   Lymph nodes:   No palpable adenopathy   Neurologic:   Cranial nerves 2 - 12 grossly intact, sensation intact, DTR       present and equal bilaterally        Results Review:    Lab Results   Component Value Date    WBC 7.48 02/17/2017    HGB 6.4 (C) 02/17/2017    HCT 21.4 (C) 02/17/2017    .9 (H) 02/17/2017     02/17/2017       Lab Results   Component Value Date    GLUCOSE 125 (H) 02/17/2017    BUN 8 02/17/2017    CREATININE 0.81 02/17/2017    EGFRIFNONA 73 02/17/2017    BCR 9.9 02/17/2017     02/17/2017    K 4.0 02/17/2017     02/17/2017    CO2 30.1 02/17/2017    CALCIUM 8.7 02/17/2017    ALBUMIN 4.10 06/28/2016    LABIL2 1.5 06/28/2016    AST 15 06/28/2016    ALT 17 06/28/2016     Lab Results   Component Value Date    INR 1.89 10/10/2014    INR 1.54 10/09/2014    INR 1.10 10/08/2014       No results found for: POCGLU       Medication Review:     Current Facility-Administered Medications:   •  apixaban (ELIQUIS) tablet 2.5 mg, 2.5 mg, Oral, Q12H, Wilfred Randle MD, 2.5 mg at 02/16/17 2125  •  butorphanol (STADOL) nasal spray 1 spray, 1 spray, Nasal, PRN, Wilfred Randle MD  •  dihydroergotamine (MIGRANAL) nasal spray 1 spray, 1 spray, Nasal, PRN, Wilfred Randle MD  •  docusate sodium (COLACE) capsule 100 mg, 100 mg, Oral, BID PRN, Wilfred Randle MD  •  ferrous sulfate tablet 325 mg, 325 mg, Oral, BID With Meals, DESTINEY Pablo, 325 mg at 02/16/17 1803  •  FLUoxetine (PROzac) capsule 20 mg, 20 mg, Oral, Q8H, Wilfred Randle MD, 20 mg at 02/16/17 2125  •  gabapentin (NEURONTIN) capsule 300 mg, 300 mg, Oral, Q12H, Wilfred Randle MD, 300 mg at 02/16/17 2125  •  ketorolac (TORADOL) injection 30 mg, 30 mg, Intravenous, Q6H PRN, Wilfred Randle MD, 30 mg at 02/16/17 2228  •  levothyroxine (SYNTHROID, LEVOTHROID) tablet 100 mcg, 100 mcg, Oral, Q AM, Wilfred Rene  MD Jer, 100 mcg at 02/16/17 0600  •  morphine injection 4 mg, 4 mg, Intravenous, Q3H PRN, Wilfred Randle MD, 4 mg at 02/15/17 1934  •  Morphine injection 8 mg, 8 mg, Intravenous, Q3H PRN, Wilfred Randle MD, 8 mg at 02/17/17 0238  •  naproxen (NAPROSYN) tablet 500 mg, 500 mg, Oral, BID With Meals, Wilfred Randle MD, 500 mg at 02/16/17 1803  •  nortriptyline (PAMELOR) capsule 50 mg, 50 mg, Oral, Nightly, Wilfred Randle MD, 50 mg at 02/15/17 2201  •  ondansetron (ZOFRAN) tablet 4 mg, 4 mg, Oral, Q6H PRN, Wilfred Randle MD, 4 mg at 02/17/17 0250  •  oxyCODONE-acetaminophen (PERCOCET)  MG per tablet 1 tablet, 1 tablet, Oral, Q4H PRN, Samuel Duane Kreis, MD, 1 tablet at 02/16/17 1448  •  verapamil SR (CALAN-SR) CR tablet 180 mg, 180 mg, Oral, Q24H, Wilfred Randle MD, 180 mg at 02/16/17 0812  •  vitamin B-12 (CYANOCOBALAMIN) tablet 1,000 mcg, 1,000 mcg, Oral, Daily, Samuel Duane Kreis, MD, 1,000 mcg at 02/16/17 0958      Assessment&#47;Plan osteoarthritis of right knee S/P TKA  Migraine Headaches  Insomnia  Acute Blood Loss Anemia  B12 def/Iron Def  + percocet prn pain    units PRBC, repeat H&H following    Continue gabapentin, nortriptyline and gabapentin for migraine prophylaxis.    Eliquis for DVT prophylaxis     Continue with PT    Monitor blood counts, transfuse <7.0    Continue B12 and Iron supplementation     CBC, BMP in the AM  DESTINEY Pablo  02/17/17  5:52 AM                      Electronically signed by DESTINEY Pablo at 2/17/2017  5:56 AM      DESTINEY Uribe at 2/17/2017 10:13 AM  Version 1 of 1         Inpatient Progress Note  Roseline Steiner  Date: 02/17/17  MRN: [id number]      Subjective:  Roseline Steiner is a 56 y.o. POD#3 right TKA. She is complaining of moderate pain especially with movement and when transferring. Patient has been hypotensive overnight. She denies paresthesias. She has no new complaints.      Objective:    Vitals:     02/17/17 0533 02/17/17 0548 02/17/17 0607 02/17/17 0713   BP: (!) 89/45 (!) 89/46 90/50 90/60   BP Location:       Patient Position:       Pulse: 84 84     Resp: 18 18     Temp: 98.5 °F (36.9 °C) 98.2 °F (36.8 °C)     TempSrc: Oral Oral     SpO2: 95% 95%     Weight:       Height:         Examination of the right knee reveals Aquacel dressing in place with post op drainage. She has mild swelling right knee. No calf tenderness. Active dorsiflexion and plantarflexion. Neurovascular status is intact.     Labs:      Results from last 7 days  Lab Units 02/17/17  0138 02/15/17  0729 02/15/17  0057   WBC 10*3/mm3 7.48  --  8.31   HEMOGLOBIN g/dL 6.4* 8.3* 7.9*   HEMATOCRIT % 21.4* 25.9* 26.3*   MCV fL 100.9*  --  100.0*   MCHC g/dL 29.9*  --  30.0*   PLATELETS 10*3/mm3 176  --  205           Results from last 7 days  Lab Units 02/17/17  0048   SODIUM mmol/L 138   POTASSIUM mmol/L 4.0   CHLORIDE mmol/L 106   TOTAL CO2 mmol/L 30.1   BUN mg/dL 8   CREATININE mg/dL 0.81   EGFR IF NONAFRICN AM mL/min/1.73 73   CALCIUM mg/dL 8.7   GLUCOSE mg/dL 125*   Estimated Creatinine Clearance: 99.2 mL/min (by C-G formula based on Cr of 0.81).  No results found for: AMMONIA          No results found for: HGBA1C  No results found for: TSH, FREET4      Pain Management Panel     Pain Management Panel Latest Ref Rng 9/12/2015    AMPHETAMINES SCREEN, URINE Negative Negative    BARBITURATES SCREEN Negative Negative    BENZODIAZEPINE SCREEN, URINE Negative Negative    COCAINE SCREEN, URINE Negative Negative    METHADONE SCREEN, URINE Negative Negative              Radiology:  Imaging Results (last 72 hours)     Procedure Component Value Units Date/Time    XR Knee 1 or 2 View Right [[id number]] Collected:  02/14/17 1050     Updated:  02/14/17 1052    Narrative:       EXAMINATION: XR KNEE 1 OR 2 VW RIGHT-      XR KNEE 1 OR 2 VW RIGHT-      INDICATION: POST SURG subsequent     COMPARISON: 1/18/2017     FINDINGS: Total right knee arthroplasty. Alignment  maintained. Soft  tissue postsurgical change.          Impression:       As above.              This report was finalized on 2017 10:50 AM by Dr. Chris Ring MD.               Assessment:      ICD-10-CM ICD-9-CM   1. Primary osteoarthritis of right knee M17.11 715.16   2. Preop testing Z01.818 V72.84       Plan:  POD#3 Right TKA. Patient is stable. She has moderate pain. Continue knee immobilizer as tolerated. Continue PT/OT per protocol today. Will receive PRBC today for  acute blood loss anemia . Will watch H&H closely. Continue Eliquis for DVT prophylaxis.       DESTINEY Uribe            Electronically signed by DESTINEY Uribe at 2017 10:19 AM        [address] [address]  Phone: [phone]  Fax:         Patient:     Roseline Steiner MRN: [id number]   [address] [place] [id number] : 1960  SSN:    Phone: [phone] Sex: F      [employer] # SUBSCRIBER ID   Primary: ESTRELLA YANG [id number]   Admitting Diagnosis: Preop testing [Z01.818]  Order Date: 2017               Inpatient consult to Case Management     (Order ID: [id number])   Diagnosis:       Priority: Today Expected Date:   Expiration Date:        Interval:  Count:    Reason for Consult? Post hospital care/equipment     Specimen Type:   Specimen Source:   Specimen Taken Date:   Specimen Taken Time:      Order Entered By: KANDICE MALONE 2017 11:10 AM                         Electronically signed by: Wilfred Randle MD (NPI: [id number])              Consult Notes (most recent note)      Samuel Duane Kreis, MD at 2017  4:45 PM  Version 1 of 1               Chief complaint arthritis of the right knee    Subjective     Patient is a 56 y.o. female presents with as an admit to [facility] secondary to right knee end-stage osteoarthritis requiring total knee arthroplasty.   The patient had a left total knee arthroplasty about 5 years ago.  She's done very well with that.  She has progressively worsening right knee pain and weakness with multiple falls with her knee giving out beneath her with impaired mobility.  She had tried corticosteroid-induced injection without any success.  She states that the pain continued to get progressively worse so ultimately she decided have a totally arthroplasty.  She was having a little bit of swelling in the knee.  She is now postop day #0 status post total knee arthroplasty.  It's been rather painful but states overall she thinks is going to do well.  She denies any shortness of breath, cough, chest pains or palpitations.  She has been using oral medications.  She states that overall her pain is been pretty well managed postoperatively.  She has a long history of migraine headaches and had a neurostimulator placed last year.  It is significantly decreased the frequency and intensity of her migraine headaches.  She uses Maxalt when necessary for migraines.  She continues on verapamil and on when necessary Maxalt and gabapentin for migraine headache prevention.  No longer taking topiramate secondary to memory loss caused by that medication.  She does occasionally use Stadol.    Review of Systems   On review of systems the patient denies the following unless noted above:     Constitutional:  Fevers, chills, weight change, fatigue     Eyes: Vision changes, headache, double vision, loss of vision     ENT: Runny nose, nose bleeds, ringing in ears, pain with swallowing, sore throat     Cardiovascular: Chest pains, palpitations, PND, orthopnea     Respiratory: Cough, wheezing, SOA, hemoptysis     GI:  Abdominal pain, diarrhea, constipation, change in stool caliber,    Rectal bleeding, vomiting or nausea     : Difficulty voiding, dysuria, hematuria     Musculoskeletal: Changes of any chronic joint pain, swelling     Skin: Rash, itching, easy  bruisability     Neurological: Unilateral weakness, new onset numbness, speech difficulties     Psychiatric: Sadness, tearfulness, feelings of hopelessness, racing thoughts     Endocrine:  Heat or cold intolerance, mood swings, polydipsia, polyphagia,    recent hypoglycemia      History  Past Medical History   Diagnosis Date   • Arthritis    • Disease of thyroid gland    • Fibromyalgia    • Fibromyalgia    • GERD (gastroesophageal reflux disease)    • Insomnia    • Migraine    • Nephrolithiasis    • PONV (postoperative nausea and vomiting)    • Spinal headache      Past Surgical History   Procedure Laterality Date   • Hysterectomy     • Cholecystectomy     • Gastric banding     • Knee surgery     • Hand surgery     • Carpal tunnel release Bilateral    • Cranial neurostimulator insertion/replacement  01/09/2017     for migraines   • Abdominal surgery     • Eye surgery Bilateral      Cataract   • Joint replacement  2012   • Joint replacement Right 02/14/2017     right total knee  [facility]  dr farah     Family History   Problem Relation Age of Onset   • Cancer Father    • Hypertension Father    • Hyperlipidemia Mother      Social History   Substance Use Topics   • Smoking status: Never Smoker   • Smokeless tobacco: Never Used   • Alcohol use No     Prescriptions Prior to Admission   Medication Sig Dispense Refill Last Dose   • butorphanol (STADOL) 10 MG/ML nasal spray 1 spray into each nostril As Needed for mild pain (1-3) or moderate pain (4-6) (migraine, take if migranol doesn't work). May repeat 1 spray in 60 min if first spray doesn't achieve good migraine control.  2 2/13/2017 at 2200   • dihydroergotamine (MIGRANAL) 4 MG/ML nasal spray 1 spray into each nostril As Needed for migraine.  2 2/13/2017 at 2200   • FLUoxetine (PROzac) 20 MG capsule Take 20 mg by mouth 3 (three) times a day.   2/13/2017 at 2200   • gabapentin (NEURONTIN) 300 MG capsule Take 300 mg by mouth 2 (two) times a day.   2/14/2017 at 0500   •  "levothyroxine (SYNTHROID, LEVOTHROID) 100 MCG tablet Take 100 mcg by mouth daily.   2/14/2017 at 0500   • nortriptyline (PAMELOR) 50 MG capsule Take 50 mg by mouth Every Night.   2/13/2017 at 2200   • verapamil PM (VERELAN PM) 180 MG 24 hr capsule Take 180 mg by mouth Daily.  2 2/14/2017 at 0500   • diclofenac (VOLTAREN) 75 MG EC tablet Take 75 mg by mouth 2 (Two) Times a Day. Was told to hold for upcoming knee surgery  1 01/31/2017 at unknown     Allergies:  Codeine    Scheduled Meds:  [START ON 2/15/2017] apixaban 2.5 mg Oral Q12H   ceFAZolin 2 g Intravenous Q8H   FLUoxetine 20 mg Oral Q8H   gabapentin 300 mg Oral Q12H   [START ON 2/15/2017] levothyroxine 100 mcg Oral Q AM   naproxen 500 mg Oral BID With Meals   nortriptyline 50 mg Oral Nightly   [START ON 2/15/2017] verapamil  mg Oral Q24H     Continuous Infusions:  lactated ringers 75 mL/hr Last Rate: Stopped (02/14/17 0950)   lactated ringers 125 mL/hr Last Rate: 125 mL/hr (02/14/17 1134)   lactated ringers 100 mL/hr      PRN Meds:.butorphanol  •  dihydroergotamine  •  docusate sodium  •  HYDROmorphone **AND** naloxone  •  ondansetron  •  oxyCODONE-acetaminophen          Objective     Vital Signs    Visit Vitals   • /69 (BP Location: Right arm, Patient Position: Lying)   • Pulse 106   • Temp 97.8 °F (36.6 °C) (Oral)   • Resp 18   • Ht 67\" (170.2 cm)   • SpO2 96%   • Breastfeeding No           Physical Exam:   General Appearance: alert, pleasant, appears stated age, interactive and   cooperative   Head: normocephalic, without obvious abnormality and atraumatic   Eyes: lids and lashes normal, conjunctivae and sclerae normal, no icterus, no   pallor, corneas clear and PERRLA   Ears: ears appear intact with no abnormalities noted   Nose: nares normal, septum midline, mucosa normal and no drainage   Throat: no oral lesions, no thrush, oral mucosa moist and oopharynx normal   Neck: no adenopathy, supple, trachea midline, no thyromegaly, no carotid " bruit   and no JVD   Back: no kyphosis present, no scoliosis present, no skin lesions, erythema, or   scars, no tenderness to percussion or palpation and range of motion normal   Lungs: clear to auscultation, respirations regular, respirations even and    respirations unlabored. No accessory muscle use.    Heart:: regular rhythm & normal rate, normal S1, S2, no murmur, no gallop, no   rub and no click.  Chest wall with no abnormalities observed. PMI nondisplaced   Abdomen: normal bowel sounds in all quadrants, no masses, no hepatomegaly,   no splenomegaly, soft non-tender, no guarding and no rebound tenderness   Extremities: no edema, no cyanosis, no redness, no tenderness, no clubbing   Musculoskeletal: Left knee with normal range of motion.  No instability.  Right knee is immobilized.  There is a drain in place.     Neurologic: Mental Status orientated to person, place, time and situation.    Speech is intelligible, Nonlabored.  Alertness alert and awake and mood/affect   normal, Cranial Nerves 2 - 12 grossly intact as examined   Sensory intact in BLE and BUE.   Motor strength  LUE is 5/5 proximally, 5/5 distally      RUE is 5/5 proximally, 5/5 distally      LLE is 5/5 @ hip flexors, quads as well as       dorsiflexion / plantar flexion      RLE is hip flexors and quadriceps cannot be assessed      dosriflexion / plantar flexion 5/5   Reflexes: Right:  2+ biceps, 2+ brachioradialis      2+ patella, 2+ achilles     Left: 2+ biceps, 2+ brachioradialis      2+ patella, 2+ achilles    Results Review:   Lab Results (last 24 hours)     ** No results found for the last 24 hours. **        Imaging Results (last 24 hours)     Procedure Component Value Units Date/Time    XR Knee 1 or 2 View Right [[id number]] Collected:  02/14/17 1050     Updated:  02/14/17 1052    Narrative:       EXAMINATION: XR KNEE 1 OR 2 VW RIGHT-      XR KNEE 1 OR 2 VW RIGHT-      INDICATION: POST SURG subsequent     COMPARISON: 1/18/2017     FINDINGS:  Total right knee arthroplasty. Alignment maintained. Soft  tissue postsurgical change.          Impression:       As above.              This report was finalized on 2/14/2017 10:50 AM by Dr. Chris Ring MD.             Assessment&#47;Plan   Problems:    Primary osteoarthritis of right knee    Migraine headaches without aura without status migrainosus       Continue postoperative care per orthopedic surgery.  Physical therapy pending.  Hydromorphone when necessary for pain.      Eliquis for DVT prophylaxis     Continue gabapentin, nortriptyline and gabapentin for migraine prophylaxis.  When necessary Stadol        Samuel Duane Kreis, MD  02/14/17  4:45 PM         Electronically signed by Samuel Duane Kreis, MD at 2/14/2017  4:51 PM

## 2017-02-17 NOTE — PROGRESS NOTES
Inpatient Progress Note  Roseline Steiner  Date: 02/17/17  MRN: [id number]      Subjective:  Roseline Steiner is a 56 y.o. POD#3 right TKA. She is complaining of moderate pain especially with movement and when transferring. Patient has been hypotensive overnight. She denies paresthesias. She has no new complaints.      Objective:    Vitals:    02/17/17 0533 02/17/17 0548 02/17/17 0607 02/17/17 0713   BP: (!) 89/45 (!) 89/46 90/50 90/60   BP Location:       Patient Position:       Pulse: 84 84     Resp: 18 18     Temp: 98.5 °F (36.9 °C) 98.2 °F (36.8 °C)     TempSrc: Oral Oral     SpO2: 95% 95%     Weight:       Height:         Examination of the right knee reveals Aquacel dressing in place with post op drainage. She has mild swelling right knee. No calf tenderness. Active dorsiflexion and plantarflexion. Neurovascular status is intact.     Labs:      Results from last 7 days  Lab Units 02/17/17  0138 02/15/17  0729 02/15/17  0057   WBC 10*3/mm3 7.48  --  8.31   HEMOGLOBIN g/dL 6.4* 8.3* 7.9*   HEMATOCRIT % 21.4* 25.9* 26.3*   MCV fL 100.9*  --  100.0*   MCHC g/dL 29.9*  --  30.0*   PLATELETS 10*3/mm3 176  --  205           Results from last 7 days  Lab Units 02/17/17  0048   SODIUM mmol/L 138   POTASSIUM mmol/L 4.0   CHLORIDE mmol/L 106   TOTAL CO2 mmol/L 30.1   BUN mg/dL 8   CREATININE mg/dL 0.81   EGFR IF NONAFRICN AM mL/min/1.73 73   CALCIUM mg/dL 8.7   GLUCOSE mg/dL 125*   Estimated Creatinine Clearance: 99.2 mL/min (by C-G formula based on Cr of 0.81).  No results found for: AMMONIA          No results found for: HGBA1C  No results found for: TSH, FREET4      Pain Management Panel     Pain Management Panel Latest Ref Rng 9/12/2015    AMPHETAMINES SCREEN, URINE Negative Negative    BARBITURATES SCREEN Negative Negative    BENZODIAZEPINE SCREEN, URINE Negative Negative    COCAINE SCREEN, URINE Negative Negative    METHADONE SCREEN, URINE Negative Negative              Radiology:  Imaging Results (last 72 hours)      Procedure Component Value Units Date/Time    XR Knee 1 or 2 View Right [[id number]] Collected:  02/14/17 1050     Updated:  02/14/17 1052    Narrative:       EXAMINATION: XR KNEE 1 OR 2 VW RIGHT-      XR KNEE 1 OR 2 VW RIGHT-      INDICATION: POST SURG subsequent     COMPARISON: 1/18/2017     FINDINGS: Total right knee arthroplasty. Alignment maintained. Soft  tissue postsurgical change.          Impression:       As above.              This report was finalized on 2/14/2017 10:50 AM by Dr. Chris Ring MD.               Assessment:      ICD-10-CM ICD-9-CM   1. Primary osteoarthritis of right knee M17.11 715.16   2. Preop testing Z01.818 V72.84       Plan:  POD#3 Right TKA. Patient is stable. She has moderate pain. Continue knee immobilizer as tolerated. Continue PT/OT per protocol today. Will receive PRBC today for  acute blood loss anemia . Will watch H&H closely. Continue Eliquis for DVT prophylaxis.       DESTINEY Uribe

## 2017-02-18 VITALS
HEIGHT: 67 IN | BODY MASS INDEX: 39.71 KG/M2 | TEMPERATURE: 98.2 F | OXYGEN SATURATION: 94 % | SYSTOLIC BLOOD PRESSURE: 108 MMHG | WEIGHT: 253 LBS | DIASTOLIC BLOOD PRESSURE: 62 MMHG | RESPIRATION RATE: 18 BRPM | HEART RATE: 90 BPM

## 2017-02-18 LAB
ABO + RH BLD: NORMAL
ABO + RH BLD: NORMAL
BH BB BLOOD EXPIRATION DATE: NORMAL
BH BB BLOOD EXPIRATION DATE: NORMAL
BH BB BLOOD TYPE BARCODE: 5100
BH BB BLOOD TYPE BARCODE: 5100
BH BB DISPENSE STATUS: NORMAL
BH BB DISPENSE STATUS: NORMAL
BH BB PRODUCT CODE: NORMAL
BH BB PRODUCT CODE: NORMAL
BH BB UNIT NUMBER: NORMAL
BH BB UNIT NUMBER: NORMAL
CROSSMATCH INTERPRETATION: NORMAL
CROSSMATCH INTERPRETATION: NORMAL
UNIT  ABO: NORMAL
UNIT  ABO: NORMAL
UNIT  RH: NORMAL
UNIT  RH: NORMAL

## 2017-02-18 PROCEDURE — 25010000002 KETOROLAC TROMETHAMINE PER 15 MG: Performed by: ORTHOPAEDIC SURGERY

## 2017-02-18 PROCEDURE — 99024 POSTOP FOLLOW-UP VISIT: CPT | Performed by: PHYSICIAN ASSISTANT

## 2017-02-18 PROCEDURE — 97116 GAIT TRAINING THERAPY: CPT

## 2017-02-18 PROCEDURE — G8978 MOBILITY CURRENT STATUS: HCPCS

## 2017-02-18 PROCEDURE — G8979 MOBILITY GOAL STATUS: HCPCS

## 2017-02-18 PROCEDURE — G8980 MOBILITY D/C STATUS: HCPCS

## 2017-02-18 PROCEDURE — 97530 THERAPEUTIC ACTIVITIES: CPT

## 2017-02-18 RX ORDER — FERROUS SULFATE 325(65) MG
325 TABLET ORAL
Qty: 21 TABLET | Refills: 0 | Status: SHIPPED | OUTPATIENT
Start: 2017-02-18 | End: 2017-02-22 | Stop reason: SDUPTHER

## 2017-02-18 RX ADMIN — APIXABAN 2.5 MG: 2.5 TABLET, FILM COATED ORAL at 09:22

## 2017-02-18 RX ADMIN — Medication 1000 MCG: at 09:22

## 2017-02-18 RX ADMIN — GABAPENTIN 300 MG: 300 CAPSULE ORAL at 09:22

## 2017-02-18 RX ADMIN — OXYCODONE HYDROCHLORIDE AND ACETAMINOPHEN 1 TABLET: 10; 325 TABLET ORAL at 09:22

## 2017-02-18 RX ADMIN — LEVOTHYROXINE SODIUM 100 MCG: 100 TABLET ORAL at 05:51

## 2017-02-18 RX ADMIN — FERROUS SULFATE TAB 325 MG (65 MG ELEMENTAL FE) 325 MG: 325 (65 FE) TAB at 09:22

## 2017-02-18 RX ADMIN — NAPROXEN 500 MG: 250 TABLET ORAL at 09:22

## 2017-02-18 RX ADMIN — VERAPAMIL HYDROCHLORIDE 180 MG: 180 TABLET, FILM COATED, EXTENDED RELEASE ORAL at 09:22

## 2017-02-18 RX ADMIN — Medication 10 ML: at 09:24

## 2017-02-18 RX ADMIN — KETOROLAC TROMETHAMINE 30 MG: 30 INJECTION, SOLUTION INTRAMUSCULAR; INTRAVENOUS at 06:22

## 2017-02-18 RX ADMIN — FLUOXETINE 20 MG: 20 CAPSULE ORAL at 05:51

## 2017-02-18 RX ADMIN — OXYCODONE HYDROCHLORIDE AND ACETAMINOPHEN 1 TABLET: 10; 325 TABLET ORAL at 04:35

## 2017-02-18 NOTE — PLAN OF CARE
Problem: Patient Care Overview (Adult)  Goal: Plan of Care Review  Outcome: Ongoing (interventions implemented as appropriate)    02/17/17 0320 02/17/17 0326 02/17/17 2015   Coping/Psychosocial Response Interventions   Plan Of Care Reviewed With --  --  patient   Patient Care Overview   Progress --  progress towards functional goals is fair --    Outcome Evaluation   Outcome Summary/Follow up Plan PT still complains of pain --  --        Goal: Adult Individualization and Mutuality  Outcome: Ongoing (interventions implemented as appropriate)    Problem: Knee Replacement, Total (Adult)  Goal: Signs and Symptoms of Listed Potential Problems Will be Absent or Manageable (Knee Replacement, Total)  Outcome: Ongoing (interventions implemented as appropriate)    Problem: Fall Risk (Adult)  Goal: Absence of Falls  Outcome: Ongoing (interventions implemented as appropriate)    Problem: Pain, Acute (Adult)  Goal: Acceptable Pain Control/Comfort Level  Outcome: Ongoing (interventions implemented as appropriate)

## 2017-02-18 NOTE — DISCHARGE SUMMARY
Roseline Steiner  1960  [id number]      Date of Discharge:  2/18/2017      Discharge Diagnosis:   Primary osteoarthritis right knee    Procedures Performed  Procedure(s):  TOTAL KNEE ARTHROPLASTY     Hospital Course  Patient is a 56 y.o. female presented with  advanced osteoarthritis of the right knee.  She was no longer responding to conservative treatment including Synvisc one and steroid injections.  Her pain was a getting progressively worse and impinging on activities of daily living.  Given this she underwent total knee arthroplasty of the right knee.  Intraoperatively was fairly uneventful.  Postoperatively x-rays were obtained revealing prosthesis in good alignment and position.  Physical therapy was consultative to begin on postoperative day 1 for range of motion and ambulation.  On postoperative day 1 she complained of moderate pain especially with movement and any transferring.  She denied associated paresthesias.  Her vital signs are stable she was afebrile and hemoglobin and white blood count within normal limits.  By postoperative day 2 she still continued to complain of moderate pain.  Her incision was inspected and showed no signs of infection.  She continue with physical therapy and was able to ambulate several feet.  On postoperative day 3 she developed anemia secondary to blood loss.  She was given 2 units of packed red blood cells.  She had been hypotensive overnight due to the anemia.  On postoperative day 4 anemia resolved and hemoglobin was back within normal limits.  She no longer had any hypotension.  She was feeling much better and was able to ambulate at the nurse's station back with therapy.  She was deemed medically stable and discharged home on February 18, 2017.  Home health was consultative for continuation of her physical therapy and dressing changes as needed.  She'll return back to the clinic in 10 days.      Consults:   Consults     Date and Time Order Name Status Description     2/14/2017 1110 Inpatient Consult to Hospitalist              Condition on Discharge:  Stable      Vital Signs  Vitals:    02/17/17 2300 02/18/17 0300 02/18/17 0633 02/18/17 1121   BP: 102/55 112/71 126/62 108/62   BP Location: Left arm Left arm Left arm Left arm   Patient Position: Lying Lying Sitting Lying   Pulse: 84 72 92 90   Resp: 20 20 18 18   Temp: 98.3 °F (36.8 °C) 98.9 °F (37.2 °C) 98.4 °F (36.9 °C) 98.2 °F (36.8 °C)   TempSrc: Oral Oral Oral Oral   SpO2: 93% 94% 94% 94%   Weight:       Height:             Discharge Medications   Roseline Steiner   Home Medication Instructions MOLINA:[id number]    Printed on:02/18/17 1227   Medication Information                      butorphanol (STADOL) 10 MG/ML nasal spray  1 spray into each nostril As Needed for mild pain (1-3) or moderate pain (4-6) (migraine, take if migranol doesn't work). May repeat 1 spray in 60 min if first spray doesn't achieve good migraine control.             diclofenac (VOLTAREN) 75 MG EC tablet  Take 75 mg by mouth 2 (Two) Times a Day. Was told to hold for upcoming knee surgery             dihydroergotamine (MIGRANAL) 4 MG/ML nasal spray  1 spray into each nostril As Needed for migraine.             FLUoxetine (PROzac) 20 MG capsule  Take 20 mg by mouth 3 (three) times a day.             gabapentin (NEURONTIN) 300 MG capsule  Take 300 mg by mouth 2 (two) times a day.             levothyroxine (SYNTHROID, LEVOTHROID) 100 MCG tablet  Take 100 mcg by mouth daily.             nortriptyline (PAMELOR) 50 MG capsule  Take 50 mg by mouth Every Night.             oxyCODONE-acetaminophen (PERCOCET)  MG per tablet  Take 1 tablet by mouth Every 4 (Four) Hours As Needed for severe pain (7-10).             verapamil PM (VERELAN PM) 180 MG 24 hr capsule  Take 180 mg by mouth Daily.                   Follow-up Appointments  No future appointments.  Referrals and Follow-ups to Schedule     Ambulatory Referral to Home Health    As directed    Change aquacel  dressing to right knee in 7 days or if 75% saturated.  Replace aquacel.      Physical therapy - weight bearing as tolerated with rolling walker.  Evaluate and treat per total knee protocol.   Face to Face Visit Date:  2/17/2017   Follow-up Provider for Plan of Care?:  I will be treating the patient on an ongoing basis.  Please send me the Plan of Care for signature.   Follow-up Provider:  ROHINI LÓPEZ   Reason/Clinical Findings:  right total knee replacement   Describe mobility limitations that make leaving home difficult:  Limited mobility   Nursing/Therapeutic Services Requested:   Skilled Nursing  Physical Therapy      Skilled nursing orders:  Wound care dressing/changes   PT orders:   Total joint pathway  Strengthening      Frequency:  1 Week 1

## 2017-02-18 NOTE — PROGRESS NOTES
"     LOS: 4 days     Chief Complaint:  Right Knee OA S/P TKA    Subjective     Interval History:     Hemoglobin 8.5.  Mobility improving.  Feeling better overall but she does still have a lot of pain in the right knee.  She denies any fevers or chills or shortness of breath.  No cough.    Objective     Vital Signs  Visit Vitals   • /62 (BP Location: Left arm, Patient Position: Lying)   • Pulse 90   • Temp 98.2 °F (36.8 °C) (Oral)   • Resp 18   • Ht 67\" (170.2 cm)   • Wt 253 lb (115 kg)  Comment: bed   • SpO2 94%   • Breastfeeding No   • BMI 39.63 kg/m2     Intake & Output (last day)       02/17 0701 - 02/18 0700 02/18 0701 - 02/19 0700    P.O. 1560     I.V. (mL/kg) 100 (0.9)     Blood 685     Total Intake(mL/kg) 2345 (20.4)     Net +2345            Unmeasured Urine Occurrence 11 x             Physical Exam:     General Appearance:    Alert, cooperative, in no acute distress   Head:    Normocephalic, without obvious abnormality, atraumatic   Eyes:            Lids and lashes normal, conjunctivae and sclerae normal, no   icterus, no pallor, corneas clear, PERRLA   Ears:    Ears appear intact with no abnormalities noted   Throat:   No oral lesions, no thrush, oral mucosa moist   Neck:   No adenopathy, supple, trachea midline, no thyromegaly, no   carotid bruit, no JVD   Lungs:     Clear to auscultation,respirations regular, even and                  unlabored    Heart:    Regular rhythm and normal rate, normal S1 and S2, no            murmur, no gallop, no rub, no click   Chest Wall:    No abnormalities observed   Abdomen:     Normal bowel sounds, no masses, no organomegaly, soft        non-tender, non-distended, no guarding, no rebound                tenderness   Extremities:   RLE in immobilizer, noted RLE edema, no cyanosis, no             redness   Pulses:   Pulses palpable and equal bilaterally   Skin:   No bleeding, bruising or rash   Lymph nodes:   No palpable adenopathy   Neurologic:   Cranial nerves 2 - " 12 grossly intact, sensation intact, DTR       present and equal bilaterally        Results Review:    Lab Results   Component Value Date    WBC 7.48 02/17/2017    HGB 8.2 (L) 02/17/2017    HCT 25.7 (L) 02/17/2017    .9 (H) 02/17/2017     02/17/2017       Lab Results   Component Value Date    GLUCOSE 125 (H) 02/17/2017    BUN 8 02/17/2017    CREATININE 0.81 02/17/2017    EGFRIFNONA 73 02/17/2017    BCR 9.9 02/17/2017     02/17/2017    K 4.0 02/17/2017     02/17/2017    CO2 30.1 02/17/2017    CALCIUM 8.7 02/17/2017    ALBUMIN 4.10 06/28/2016    LABIL2 1.5 06/28/2016    AST 15 06/28/2016    ALT 17 06/28/2016     Lab Results   Component Value Date    INR 1.89 10/10/2014    INR 1.54 10/09/2014    INR 1.10 10/08/2014       No results found for: POCGLU       Medication Review:     Current Facility-Administered Medications:   •  apixaban (ELIQUIS) tablet 2.5 mg, 2.5 mg, Oral, Q12H, Wilfred Randle MD, 2.5 mg at 02/18/17 0922  •  butorphanol (STADOL) nasal spray 1 spray, 1 spray, Nasal, PRN, Wilfred Randle MD  •  dihydroergotamine (MIGRANAL) nasal spray 1 spray, 1 spray, Nasal, PRN, Wilfred Randle MD  •  docusate sodium (COLACE) capsule 100 mg, 100 mg, Oral, BID PRN, Wilfred Randle MD  •  ferrous sulfate tablet 325 mg, 325 mg, Oral, BID With Meals, DESTINEY Pablo, 325 mg at 02/18/17 0922  •  FLUoxetine (PROzac) capsule 20 mg, 20 mg, Oral, Q8H, Wilfred Randle MD, 20 mg at 02/18/17 0551  •  gabapentin (NEURONTIN) capsule 300 mg, 300 mg, Oral, Q12H, Wilfred Randle MD, 300 mg at 02/18/17 0922  •  ketorolac (TORADOL) injection 30 mg, 30 mg, Intravenous, Q6H PRN, Wilfred Randle MD, 30 mg at 02/18/17 0622  •  levothyroxine (SYNTHROID, LEVOTHROID) tablet 100 mcg, 100 mcg, Oral, Q AM, Wilfred Randle MD, 100 mcg at 02/18/17 0551  •  morphine injection 4 mg, 4 mg, Intravenous, Q3H PRN, Wilfred Randle MD, 4 mg at 02/15/17 1934  •   Morphine injection 8 mg, 8 mg, Intravenous, Q3H PRN, Wilfred Randle MD, 8 mg at 02/17/17 0238  •  naproxen (NAPROSYN) tablet 500 mg, 500 mg, Oral, BID With Meals, Wilfred Randle MD, 500 mg at 02/18/17 0922  •  nortriptyline (PAMELOR) capsule 50 mg, 50 mg, Oral, Nightly, Wilfred Randle MD, 50 mg at 02/17/17 2234  •  ondansetron (ZOFRAN) tablet 4 mg, 4 mg, Oral, Q6H PRN, Wilfred Randle MD, 4 mg at 02/17/17 0250  •  oxyCODONE-acetaminophen (PERCOCET)  MG per tablet 1 tablet, 1 tablet, Oral, Q4H PRN, Samuel Duane Kreis, MD, 1 tablet at 02/18/17 0922  •  sodium chloride 0.9 % flush 10 mL, 10 mL, Intracatheter, Q12H, Wilfred Randle MD, 10 mL at 02/18/17 0924  •  sodium chloride 0.9 % flush 10 mL, 10 mL, Intracatheter, PRN, Wilfred Randle MD  •  verapamil SR (CALAN-SR) CR tablet 180 mg, 180 mg, Oral, Q24H, Samuel Duane Kreis, MD, 180 mg at 02/18/17 0922  •  vitamin B-12 (CYANOCOBALAMIN) tablet 1,000 mcg, 1,000 mcg, Oral, Daily, Samuel Duane Kreis, MD, 1,000 mcg at 02/18/17 0922      Assessment/Plan   Primary osteoarthritis of right knee S/P TKA  Migraine Headaches  Insomnia  Acute Blood Loss Anemia  B12 def/Iron Def    Morphine + percocet prn pain    Continue gabapentin, nortriptyline and gabapentin for migraine prophylaxis.    Eliquis for DVT prophylaxis     Continue with PT    Status post transfusion of packed red blood cells.  Continue with supplements of iron and B12    She should be stable for discharge to home  Samuel Duane Kreis, MD  02/18/17  1:48 PM

## 2017-02-18 NOTE — DISCHARGE INSTRUCTIONS
Change aquacel dressing to the right knee in 7 days or if 75% saturated.  [facility] will continue your care at home for dressing changes and physical therapy.  [phone].

## 2017-02-18 NOTE — THERAPY DISCHARGE NOTE
Acute Care - Physical Therapy Treatment Note/Discharge  ANNITA Adame     Patient Name: Roseline Steiner  : 1960  MRN: [id number]  Today's Date: 2017  Onset of Illness/Injury or Date of Surgery Date: 17  Date of Referral to PT: 17  Referring Physician: Dr. Randle    Admit Date: 2017    Visit Dx:    ICD-10-CM ICD-9-CM   1. Status post total right knee replacement Z96.651 V43.65   2. Primary osteoarthritis of right knee M17.11 715.16   3. Preop testing Z01.818 V72.84     Patient Active Problem List   Diagnosis   • Migraine with status migrainosus   • Arthralgia of left hip   • Greater trochanteric bursitis   • Primary osteoarthritis of right knee       Physical Therapy Education     Title: PT OT SLP Therapies (Resolved)     Topic: Physical Therapy (Resolved)     Point: Mobility training (Resolved)    Learning Progress Summary    Learner Readiness Method Response Comment Documented by Status   Patient Acceptance E [facility] 17 1447 Done    Acceptance E [facility] 17 1606 Done    Acceptance E NR  BC 02/15/17 1612 Active    Acceptance E NR  BC 17 1816 Active               Point: Home exercise program (Resolved)    Learning Progress Summary    Learner Readiness Method Response Comment Documented by Status   Patient Acceptance E [facility] 17 1447 Done    Acceptance E [facility] 17 1606 Done    Acceptance E NR  BC 02/15/17 1612 Active    Acceptance E NR  BC 17 1816 Active               Point: Body mechanics (Resolved)    Learning Progress Summary    Learner Readiness Method Response Comment Documented by Status   Patient Acceptance E [facility] 17 1447 Done    Acceptance E [facility] 17 1606 Done    Acceptance E NR  BC 02/15/17 1612 Active    Acceptance E NR  BC 17 1816 Active               Point: Precautions (Resolved)    Learning Progress Summary    Learner Readiness Method Response Comment Documented by Status   Patient Acceptance E [facility] 17 1447 Done     Acceptance E VU  BC 02/16/17 1606 Done    Acceptance E NR  BC 02/15/17 1612 Active    Acceptance E NR  BC 02/14/17 1816 Active                      User Key     Initials Effective Dates Name Provider Type Discipline    BC 03/14/16 -  Mackenzie Foster, PT Physical Therapist PT                    IP PT Goals       02/14/17 1816          Bed Mobility PT LTG    Bed Mobility PT LTG, Date Established 02/14/17  -BC      Bed Mobility PT LTG, Time to Achieve 2 - 3 days  -BC      Bed Mobility PT LTG, Activity Type all bed mobility  -BC      Bed Mobility PT LTG, [place] Level contact guard assist  -BC      Bed Mobility PT Goal  LTG, Assist Device bed rails  -BC      Transfer Training PT LTG    Transfer Training PT LTG, Time to Achieve 2 - 3 days  -BC      Transfer Training PT LTG, Activity Type all transfers  -BC      Transfer Training PT LTG, [place] Level contact guard assist  -BC      Transfer Training PT LTG, Assist Device walker, rolling  -BC      Gait Training PT LTG    Gait Training Goal PT LTG, Date Established 02/14/17  -BC      Gait Training Goal PT LTG, Time to Achieve 2 - 3 days  -BC      Gait Training Goal PT LTG, [place] Level contact guard assist  -BC      Gait Training Goal PT LTG, Assist Device walker, rolling  -BC      Gait Training Goal PT LTG, Distance to Achieve 80  -BC        User Key  (r) = Recorded By, (t) = Taken By, (c) = Cosigned By    Initials Name Provider Type    BC Mackenzie Foster, PT Physical Therapist              Adult Rehabilitation Note       02/18/17 1339 02/17/17 1437 02/16/17 1547    Rehab Assessment/Intervention    Discipline physical therapist  -BC physical therapist  -BC physical therapist  -BC    Document Type therapy note (daily note)  -BC therapy note (daily note)  -BC therapy note (daily note)  -BC    Subjective Information agree to therapy;complains of;weakness  -BC agree to therapy;complains of;weakness;fatigue  -BC agree to therapy;complains of;weakness  -BC     Patient Effort, Rehab Treatment good  -BC good  -BC good  -BC    Recorded by [BC] Mackenzie Foster PT [BC] Mackenzie Foster PT [BC] Mackenzie Foster PT    Pain Assessment    Pain Assessment Hernandez-Baker FACES  -BC Hernandez-Baker FACES  -BC Hernandez-Baker FACES  -BC    Hernandez-Baker FACES Pain Rating 2  -BC 6  -BC 6  -BC    Pain Location Knee  -BC Knee  -BC Knee  -BC    Pain Orientation Right  -BC      Recorded by [BC] Mackenzie Foster PT [BC] Mackenzie Foster PT [BC] Mackenzie Foster PT    Cognitive Assessment/Intervention    Current Cognitive/Communication Assessment functional  -BC functional  -BC functional  -BC    Orientation Status oriented x 4  -BC oriented x 4  -BC oriented x 4  -BC    Follows Commands/Answers Questions 100% of the time  -% of the time  -% of the time  -BC    Personal Safety WNL/WFL  -BC WNL/WFL  -BC WNL/WFL;decreased awareness, need for assist;decreased awareness, need for safety  -BC    Personal Safety Interventions gait belt;nonskid shoes/slippers when out of bed  -BC gait belt;nonskid shoes/slippers when out of bed  -BC gait belt;nonskid shoes/slippers when out of bed  -BC    Recorded by [BC] Mackenzie Foster PT [BC] Mackenzie Foster PT [BC] Mackenzie Foster PT    Bed Mobility, Assessment/Treatment    Bed Mobility, Assistive Device bed rails  -BC bed rails  -BC bed rails  -BC    Bed Mob, Supine to Sit, [place] verbal cues required;nonverbal cues required (demo/gesture);2 person assist required;minimum assist (75% patient effort)  -BC verbal cues required;nonverbal cues required (demo/gesture);2 person assist required;minimum assist (75% patient effort)  -BC verbal cues required;nonverbal cues required (demo/gesture);moderate assist (50% patient effort);2 person assist required  -BC    Bed Mob, Sit to Supine, [place] verbal cues required;nonverbal cues required (demo/gesture);2 person assist required;minimum assist (75% patient effort)  -BC verbal cues required;nonverbal  cues required (demo/gesture);2 person assist required;minimum assist (75% patient effort)  -BC verbal cues required;nonverbal cues required (demo/gesture);moderate assist (50% patient effort);2 person assist required  -BC    Recorded by [BC] Mackenzie Foster PT [BC] Mackenzie Foster PT [BC] Mackenzie Foster PT    Transfer Assessment/Treatment    Transfers, Sit-Stand [place] verbal cues required;nonverbal cues required (demo/gesture);minimum assist (75% patient effort);moderate assist (50% patient effort);2 person assist required  -BC verbal cues required;nonverbal cues required (demo/gesture);minimum assist (75% patient effort);moderate assist (50% patient effort);2 person assist required  -BC verbal cues required;nonverbal cues required (demo/gesture);minimum assist (75% patient effort);moderate assist (50% patient effort);2 person assist required  -BC    Transfers, Stand-Sit [place] verbal cues required;nonverbal cues required (demo/gesture);moderate assist (50% patient effort);minimum assist (75% patient effort);2 person assist required  -BC verbal cues required;nonverbal cues required (demo/gesture);moderate assist (50% patient effort);minimum assist (75% patient effort);2 person assist required  -BC verbal cues required;nonverbal cues required (demo/gesture);moderate assist (50% patient effort);minimum assist (75% patient effort);2 person assist required  -BC    Transfers, Sit-Stand-Sit, Assist Device standard walker  -BC standard walker  -BC standard walker  -BC    Recorded by [BC] Mackenzie Foster PT [BC] Mackenzie Foster PT [BC] Mackenzie Foster PT    Gait Assessment/Treatment    Gait, [place] Level minimum assist (75% patient effort);2 person assist required;verbal cues required;nonverbal cues required (demo/gesture)  -BC minimum assist (75% patient effort);2 person assist required;verbal cues required;nonverbal cues required (demo/gesture)  -BC minimum assist (75% patient effort);2  person assist required;verbal cues required;nonverbal cues required (demo/gesture)  -BC    Gait, Assistive Device rolling walker  -BC rolling walker  -BC rolling walker  -BC    Gait, Distance (Feet) 160  -BC 80   no pm session due to getting blood  -   120pm  -BC    Recorded by [BC] Mackenzie Foster PT [BC] Mackenzie Foster PT [BC] Mackenzie Fostre PT    Positioning and Restraints    Pre-Treatment Position in bed  -BC in bed  -BC in bed  -BC    Post Treatment Position chair  -BC chair  -BC chair  -BC    In Bed notified nsg;supine;call light within reach;encouraged to call for assist  -BC notified nsg;supine;call light within reach;encouraged to call for assist;side rails up x3  -BC notified nsg;supine;call light within reach;encouraged to call for assist;with family/caregiver;side rails up x3   pm  -BC    In Chair   notified nsg;sitting;encouraged to call for assist;call light within reach  -BC    Recorded by [BC] Mackenzie Foster PT [BC] Mackenzie Foster PT [BC] Mackenzie Foster PT      02/15/17 5794          Rehab Assessment/Intervention    Discipline physical therapist  -BC      Document Type therapy note (daily note)  -BC      Subjective Information agree to therapy;complains of;weakness  -BC      Patient Effort, Rehab Treatment good  -BC      Recorded by [BC] Mackenzie Foster PT      Pain Assessment    Pain Assessment Hernandez-Baker FACES  -BC      Hernandez-Baker FACES Pain Rating 4  -BC      Pain Location Knee  -BC      Recorded by [BC] Mackenzie Foster PT      Cognitive Assessment/Intervention    Current Cognitive/Communication Assessment functional  -BC      Orientation Status oriented x 4  -BC      Follows Commands/Answers Questions 100% of the time;able to follow single-step instructions  -BC      Personal Safety WNL/WFL;decreased awareness, need for safety  -BC      Personal Safety Interventions gait belt;nonskid shoes/slippers when out of bed  -BC      Recorded by [BC] Mackenzie Foster PT      Bed  Mobility, Assessment/Treatment    Bed Mobility, Assistive Device bed rails  -BC      Bed Mob, Supine to Sit, [place] verbal cues required;nonverbal cues required (demo/gesture);moderate assist (50% patient effort);2 person assist required  -BC      Bed Mob, Sit to Supine, [place] verbal cues required;nonverbal cues required (demo/gesture);moderate assist (50% patient effort);2 person assist required  -BC      Recorded by [BC] Mackenzie Foster PT      Transfer Assessment/Treatment    Transfers, Sit-Stand [place] verbal cues required;nonverbal cues required (demo/gesture);minimum assist (75% patient effort);moderate assist (50% patient effort);2 person assist required  -BC      Transfers, Stand-Sit [place] verbal cues required;nonverbal cues required (demo/gesture);moderate assist (50% patient effort);minimum assist (75% patient effort);2 person assist required  -BC      Transfers, Sit-Stand-Sit, Assist Device standard walker  -BC      Recorded by [BC] Mackenzie Foster PT      Gait Assessment/Treatment    Gait, [place] Level minimum assist (75% patient effort);2 person assist required;verbal cues required;nonverbal cues required (demo/gesture)  -BC      Gait, Assistive Device rolling walker  -BC      Gait, Distance (Feet) 80   75 pm  -BC      Recorded by [BC] Mackenzie Foster PT      Positioning and Restraints    Pre-Treatment Position in bed  -BC      Post Treatment Position chair  -BC      In Bed notified nsg;supine;call light within reach;encouraged to call for assist;with family/caregiver;side rails up x3   pm  -BC      In Chair notified nsg;sitting;call light within reach;encouraged to call for assist   am  -BC      Recorded by [BC] Mackenzie Foster PT        User Key  (r) = Recorded By, (t) = Taken By, (c) = Cosigned By    Initials Name Effective Dates    BC Mackenzie Foster PT 03/14/16 -           PT Recommendation and Plan  Planned Therapy Interventions: balance training, bed  mobility training, gait training, home exercise program, patient/family education, strengthening, transfer training  PT Frequency: 2 times/day, 5 times/wk, per priority policy             Outcome Measures       02/18/17 1300 02/17/17 1400 02/16/17 1607    How much help from another person do you currently need...    Turning from your back to your side while in flat bed without using bedrails? 3  -BC 3  -BC 3  -BC    Moving from lying on back to sitting on the side of a flat bed without bedrails? 3  -BC 3  -BC 3  -BC    Moving to and from a bed to a chair (including a wheelchair)? 3  -BC 3  -BC 3  -BC    Standing up from a chair using your arms (e.g., wheelchair, bedside chair)? 3  -BC 3  -BC 3  -BC    Climbing 3-5 steps with a railing? 2  -BC 2  -BC 2  -BC    To walk in hospital room? 3  -BC 3  -BC 3  -BC    AM-PAC 6 Clicks Score 17  -BC 17  -BC 17  -BC    Functional Assessment    Outcome Measure Options AM-PAC 6 Clicks Basic Mobility (PT)  -BC AM-PAC 6 Clicks Basic Mobility (PT)  -BC AM-PAC 6 Clicks Basic Mobility (PT)  -BC      02/15/17 1600          How much help from another person do you currently need...    Turning from your back to your side while in flat bed without using bedrails? 3  -BC      Moving from lying on back to sitting on the side of a flat bed without bedrails? 2  -BC      Moving to and from a bed to a chair (including a wheelchair)? 3  -BC      Standing up from a chair using your arms (e.g., wheelchair, bedside chair)? 3  -BC      Climbing 3-5 steps with a railing? 2  -BC      To walk in hospital room? 3  -BC      AM-PAC 6 Clicks Score 16  -BC      Functional Assessment    Outcome Measure Options AM-PAC 6 Clicks Basic Mobility (PT)  -BC        User Key  (r) = Recorded By, (t) = Taken By, (c) = Cosigned By    Initials Name Provider Type    GINA Foster, PT Physical Therapist           Time Calculation:         PT Charges       02/18/17 4484          Time Calculation    Start Time --   30   -BC      PT Received On 02/18/17  -BC        User Key  (r) = Recorded By, (t) = Taken By, (c) = Cosigned By    Initials Name Provider Type    BC Mackenzie Foster, PT Physical Therapist          Therapy Charges for Today     Code Description Service Date Service Provider Modifiers Qty    79223562401 HC GAIT TRAINING EA 15 MIN 2/17/2017 Mackenzie Foster, PT GP 1    72783713731 HC PT THERAPEUTIC ACT EA 15 MIN 2/17/2017 Mackenzie Foster, PT GP 1    68424329862 HC PT THER SUPP EA 15 MIN 2/17/2017 Mackenzie Foster, PT GP 2    17248703271 HC PT MOBILITY CURRENT 2/18/2017 Mackenzie Foster, PT GP, CK 1    99935715705 HC PT MOBILITY PROJECTED 2/18/2017 Mackenzie Foster, PT GP, CK 1    29849244742 HC PT MOBILITY DISCHARGE 2/18/2017 Mackenzie Foster, PT GP, CK 1    16918384148 HC GAIT TRAINING EA 15 MIN 2/18/2017 Mackenzie Foster, PT GP 1    00432689403 HC PT THERAPEUTIC ACT EA 15 MIN 2/18/2017 Mackenzie Foster, PT GP 1    22691326589 HC PT THER SUPP EA 15 MIN 2/18/2017 Mackenzie Foster, PT GP 2          PT G-Codes  Outcome Measure Options: AM-PAC 6 Clicks Basic Mobility (PT)  Score: 17  Functional Limitation: Mobility: Walking and moving around  Mobility: Walking and Moving Around Current Status (): At least 40 percent but less than 60 percent impaired, limited or restricted  Mobility: Walking and Moving Around Goal Status (): At least 40 percent but less than 60 percent impaired, limited or restricted  Mobility: Walking and Moving Around Discharge Status (): At least 40 percent but less than 60 percent impaired, limited or restricted         Mackenzie Foster PT  2/18/2017

## 2017-02-18 NOTE — PROGRESS NOTES
Inpatient Progress Note  Roseline Steiner  Date: 02/18/17  MRN: [id number]      Subjective:  Roseline Steiner is a 56 y.o. POD#4 right TKA. She is complaining of moderate pain especially with movement and when transferring. Patient anemia has improved and blood pressure is normal. She denies paresthesias. She has no new complaints.      Objective:    Vitals:    02/17/17 2300 02/18/17 0300 02/18/17 0633 02/18/17 1121   BP: 102/55 112/71 126/62 108/62   BP Location: Left arm Left arm Left arm Left arm   Patient Position: Lying Lying Sitting Lying   Pulse: 84 72 92 90   Resp: 20 20 18 18   Temp: 98.3 °F (36.8 °C) 98.9 °F (37.2 °C) 98.4 °F (36.9 °C) 98.2 °F (36.8 °C)   TempSrc: Oral Oral Oral Oral   SpO2: 93% 94% 94% 94%   Weight:       Height:         Examination of the right knee reveals Aquacel dressing in place with post op drainage. She has mild swelling right knee. No calf tenderness. Active dorsiflexion and plantarflexion. Neurovascular status is intact.     Labs:      Results from last 7 days  Lab Units 02/17/17  2254 02/17/17  0138 02/15/17  0729 02/15/17  0057   WBC 10*3/mm3  --  7.48  --  8.31   HEMOGLOBIN g/dL 8.2* 6.4* 8.3* 7.9*   HEMATOCRIT % 25.7* 21.4* 25.9* 26.3*   MCV fL  --  100.9*  --  100.0*   MCHC g/dL  --  29.9*  --  30.0*   PLATELETS 10*3/mm3  --  176  --  205           Results from last 7 days  Lab Units 02/17/17  0048   SODIUM mmol/L 138   POTASSIUM mmol/L 4.0   CHLORIDE mmol/L 106   TOTAL CO2 mmol/L 30.1   BUN mg/dL 8   CREATININE mg/dL 0.81   EGFR IF NONAFRICN AM mL/min/1.73 73   CALCIUM mg/dL 8.7   GLUCOSE mg/dL 125*   Estimated Creatinine Clearance: 99.2 mL/min (by C-G formula based on Cr of 0.81).  No results found for: AMMONIA          No results found for: HGBA1C  No results found for: TSH, FREET4      Pain Management Panel     Pain Management Panel Latest Ref Rng 9/12/2015    AMPHETAMINES SCREEN, URINE Negative Negative    BARBITURATES SCREEN Negative Negative    BENZODIAZEPINE SCREEN, URINE  Negative Negative    COCAINE SCREEN, URINE Negative Negative    METHADONE SCREEN, URINE Negative Negative              Radiology:  Imaging Results (last 72 hours)     Procedure Component Value Units Date/Time    XR Knee 1 or 2 View Right [[id number]] Collected:  02/14/17 1050     Updated:  02/14/17 1052    Narrative:       EXAMINATION: XR KNEE 1 OR 2 VW RIGHT-      XR KNEE 1 OR 2 VW RIGHT-      INDICATION: POST SURG subsequent     COMPARISON: 1/18/2017     FINDINGS: Total right knee arthroplasty. Alignment maintained. Soft  tissue postsurgical change.          Impression:       As above.              This report was finalized on 2/14/2017 10:50 AM by Dr. Chris Ring MD.               Assessment:      ICD-10-CM ICD-9-CM   1. Status post total right knee replacement Z96.651 V43.65   2. Primary osteoarthritis of right knee M17.11 715.16   3. Preop testing Z01.818 V72.84       Plan:  POD#4 Right TKA. Patient is stable. She has moderate pain. Continue knee immobilizer as tolerated. Continue PT/OT per protocol today. Continue Eliquis for DVT prophylaxis. Will discharge home today. RTC in 10 days.       DESTINEY Uribe

## 2017-02-18 NOTE — PROGRESS NOTES
Discharge Planning Assessment   Deep     Patient Name: Roseline Steiner  MRN: [id number]  Today's Date: 2/18/2017    Admit Date: 2/14/2017    Discharge Plan       02/18/17 1331    Final Note    Final Note Pt is being discharged home on this date. Pt is being discharged with [facility]. SS faxed pt's information and AVS to [facility]. Pt's nurse called report. No other needs identified.      Marisela Davenport

## 2017-02-19 NOTE — PAYOR COMM NOTE
"[facility]   [facility]  PHONE  [phone]  FAX  [phone]    Roseline Guerrero (56 y.o. Female)     Date of Birth Social Security Number Address Home Phone MRN    1960  [address]   [facility] [address] [phone] [id number]    [employer] Marital Status          [employer]        Admission Date Admission Type Admitting Provider Attending Provider Department, Room/Bed    2/14/17 Elective Wilfred Randle MD  [facility] 2 SOUTH, 210/1S    Discharge Date Discharge Disposition Discharge Destination        2/18/2017 Home or Self Care             Attending Provider: (none)    Allergies:  Codeine    Isolation:  None   Infection:  None   Code Status:  Prior    Ht:  67\" (170.2 cm)   Wt:  253 lb (115 kg)    Admission Cmt:  None   Principal Problem:  None                Active Insurance as of 2/14/2017     Primary Coverage     Payor Plan Insurance Group Employer/Plan Group    [employer] EM[employer]OY[employer] [id number]     Payor Plan Address Payor Plan Phone Number Effective From Effective To    [address] [phone] 1/1/2015     [place] [address]       Subscriber Name Subscriber Birth Date Member ID       ROSELINE GUERRERO 1960 [id number]                 Emergency Contacts      (Rel.) Home Phone Work Phone Mobile Phone    KoNancy lewisyl (Sister) [phone] -- --    Wilfred Guerrero (Spouse) [phone] -- --              "

## 2017-02-22 ENCOUNTER — HOSPITAL ENCOUNTER (OUTPATIENT)
Dept: ULTRASOUND IMAGING | Facility: HOSPITAL | Age: 57
Discharge: HOME | End: 2017-02-22
Attending: ORTHOPAEDIC SURGERY | Admitting: ORTHOPAEDIC SURGERY

## 2017-02-22 ENCOUNTER — OFFICE VISIT (OUTPATIENT)
Dept: ORTHOPEDIC SURGERY | Facility: CLINIC | Age: 57
End: 2017-02-22

## 2017-02-22 VITALS — BODY MASS INDEX: 39.24 KG/M2 | WEIGHT: 250 LBS | HEIGHT: 67 IN

## 2017-02-22 DIAGNOSIS — M25.561 PAIN AND SWELLING OF KNEE, RIGHT: ICD-10-CM

## 2017-02-22 DIAGNOSIS — M25.461 PAIN AND SWELLING OF KNEE, RIGHT: ICD-10-CM

## 2017-02-22 DIAGNOSIS — Z96.651 STATUS POST TOTAL RIGHT KNEE REPLACEMENT: Primary | ICD-10-CM

## 2017-02-22 PROCEDURE — 93971 EXTREMITY STUDY: CPT | Performed by: RADIOLOGY

## 2017-02-22 PROCEDURE — 93971 EXTREMITY STUDY: CPT

## 2017-02-22 PROCEDURE — 99024 POSTOP FOLLOW-UP VISIT: CPT | Performed by: ORTHOPAEDIC SURGERY

## 2017-02-22 RX ORDER — LANOLIN ALCOHOL/MO/W.PET/CERES
CREAM (GRAM) TOPICAL
Refills: 0 | COMMUNITY
Start: 2017-02-18 | End: 2018-06-15 | Stop reason: ALTCHOICE

## 2017-02-22 NOTE — PROGRESS NOTES
Roseline Steiner   :1960    Date of encounter:2017        HPI:  Roseline Steiner is a 56 y.o. female seen today in follow-up of right total knee arthroplasty on 2017, now 8 days out.  She is complaining of pressure and pain in her right leg which began yesterday.  She did well up until then.  She did not have any events yesterday.      Exam:   Right lower extremity with midline incision intact, no erythema and no drainage, no swelling localized.  There is no increased pressure or tightness about her calf or thigh.  She is able to bend her knee.  She has palpable dorsalis pedis pulse, palpable posterior tibial pulse.  She can actively extend her ankle and toes and has normal sensation.      Assessment:   86-year-old female now 8 days postop right total knee arthroplasty doing well other than the sudden onset of the pressure in her right thigh and her right calf.  She is currently anticoagulated with Eliquis.    ICD-10-CM ICD-9-CM   1. Status post total right knee replacement Z96.651 V43.65   2. Pain and swelling of knee, right M25.561 719.46    M25.461        Plan:  Will obtain venous doppler right lower extremitiy.  She will be provided prescription for Toradol 10 mg 1 by mouth every 6 hours #12 she will take these sparingly.    Samuel Duane Kreis, MD

## 2017-02-24 ENCOUNTER — TELEPHONE (OUTPATIENT)
Dept: ORTHOPEDIC SURGERY | Facility: CLINIC | Age: 57
End: 2017-02-24

## 2017-02-27 ENCOUNTER — OFFICE VISIT (OUTPATIENT)
Dept: ORTHOPEDIC SURGERY | Facility: CLINIC | Age: 57
End: 2017-02-27

## 2017-02-27 VITALS — BODY MASS INDEX: 39.24 KG/M2 | WEIGHT: 250 LBS | HEIGHT: 67 IN

## 2017-02-27 DIAGNOSIS — Z96.651 STATUS POST TOTAL RIGHT KNEE REPLACEMENT: Primary | ICD-10-CM

## 2017-02-27 PROCEDURE — 99024 POSTOP FOLLOW-UP VISIT: CPT | Performed by: ORTHOPAEDIC SURGERY

## 2017-03-12 ENCOUNTER — HOSPITAL ENCOUNTER (EMERGENCY)
Facility: HOSPITAL | Age: 57
Discharge: HOME | End: 2017-03-12
Attending: EMERGENCY MEDICINE | Admitting: EMERGENCY MEDICINE

## 2017-03-12 ENCOUNTER — APPOINTMENT (OUTPATIENT)
Dept: GENERAL RADIOLOGY | Facility: HOSPITAL | Age: 57
End: 2017-03-12

## 2017-03-12 VITALS
BODY MASS INDEX: 37.67 KG/M2 | OXYGEN SATURATION: 99 % | HEART RATE: 98 BPM | DIASTOLIC BLOOD PRESSURE: 88 MMHG | RESPIRATION RATE: 18 BRPM | TEMPERATURE: 98.2 F | WEIGHT: 240 LBS | SYSTOLIC BLOOD PRESSURE: 140 MMHG | HEIGHT: 67 IN

## 2017-03-12 DIAGNOSIS — M70.61 TROCHANTERIC BURSITIS, RIGHT HIP: Primary | ICD-10-CM

## 2017-03-12 PROCEDURE — 25010000002 METHYLPREDNISOLONE PER 80 MG: Performed by: PHYSICIAN ASSISTANT

## 2017-03-12 PROCEDURE — 73564 X-RAY EXAM KNEE 4 OR MORE: CPT | Performed by: RADIOLOGY

## 2017-03-12 PROCEDURE — 96372 THER/PROPH/DIAG INJ SC/IM: CPT

## 2017-03-12 PROCEDURE — 73502 X-RAY EXAM HIP UNI 2-3 VIEWS: CPT | Performed by: RADIOLOGY

## 2017-03-12 PROCEDURE — 73564 X-RAY EXAM KNEE 4 OR MORE: CPT

## 2017-03-12 PROCEDURE — 25010000002 MORPHINE (PF) 10 MG/ML SOLUTION: Performed by: EMERGENCY MEDICINE

## 2017-03-12 PROCEDURE — 25010000002 PROMETHAZINE PER 50 MG: Performed by: EMERGENCY MEDICINE

## 2017-03-12 PROCEDURE — 73502 X-RAY EXAM HIP UNI 2-3 VIEWS: CPT

## 2017-03-12 PROCEDURE — 25010000002 KETOROLAC TROMETHAMINE PER 15 MG: Performed by: EMERGENCY MEDICINE

## 2017-03-12 PROCEDURE — 99284 EMERGENCY DEPT VISIT MOD MDM: CPT

## 2017-03-12 RX ORDER — PROMETHAZINE HYDROCHLORIDE 25 MG/ML
25 INJECTION, SOLUTION INTRAMUSCULAR; INTRAVENOUS ONCE
Status: COMPLETED | OUTPATIENT
Start: 2017-03-12 | End: 2017-03-12

## 2017-03-12 RX ORDER — HYDROCODONE BITARTRATE AND ACETAMINOPHEN 7.5; 325 MG/1; MG/1
1 TABLET ORAL ONCE
Status: COMPLETED | OUTPATIENT
Start: 2017-03-12 | End: 2017-03-12

## 2017-03-12 RX ORDER — NAPROXEN 500 MG/1
500 TABLET ORAL 2 TIMES DAILY PRN
Qty: 20 TABLET | Refills: 0 | Status: SHIPPED | OUTPATIENT
Start: 2017-03-12 | End: 2018-06-15 | Stop reason: ALTCHOICE

## 2017-03-12 RX ORDER — KETOROLAC TROMETHAMINE 30 MG/ML
60 INJECTION, SOLUTION INTRAMUSCULAR; INTRAVENOUS ONCE
Status: COMPLETED | OUTPATIENT
Start: 2017-03-12 | End: 2017-03-12

## 2017-03-12 RX ORDER — HYDROCODONE BITARTRATE AND ACETAMINOPHEN 7.5; 325 MG/1; MG/1
1 TABLET ORAL EVERY 6 HOURS PRN
Qty: 10 TABLET | Refills: 0 | Status: SHIPPED | OUTPATIENT
Start: 2017-03-12 | End: 2018-06-15 | Stop reason: ALTCHOICE

## 2017-03-12 RX ORDER — ONDANSETRON 4 MG/1
4 TABLET, ORALLY DISINTEGRATING ORAL ONCE
Status: COMPLETED | OUTPATIENT
Start: 2017-03-12 | End: 2017-03-12

## 2017-03-12 RX ORDER — MORPHINE SULFATE 10 MG/ML
6 INJECTION INTRAMUSCULAR; INTRAVENOUS; SUBCUTANEOUS ONCE
Status: COMPLETED | OUTPATIENT
Start: 2017-03-12 | End: 2017-03-12

## 2017-03-12 RX ORDER — METHYLPREDNISOLONE ACETATE 80 MG/ML
120 INJECTION, SUSPENSION INTRA-ARTICULAR; INTRALESIONAL; INTRAMUSCULAR; SOFT TISSUE ONCE
Status: COMPLETED | OUTPATIENT
Start: 2017-03-12 | End: 2017-03-12

## 2017-03-12 RX ADMIN — MORPHINE SULFATE 6 MG: 10 INJECTION, SOLUTION INTRAMUSCULAR; INTRAVENOUS at 06:45

## 2017-03-12 RX ADMIN — HYDROCODONE BITARTRATE AND ACETAMINOPHEN 1 TABLET: 7.5; 325 TABLET ORAL at 07:54

## 2017-03-12 RX ADMIN — METHYLPREDNISOLONE ACETATE 120 MG: 80 INJECTION, SUSPENSION INTRA-ARTICULAR; INTRALESIONAL; INTRAMUSCULAR; SOFT TISSUE at 07:54

## 2017-03-12 RX ADMIN — PROMETHAZINE HYDROCHLORIDE 25 MG: 25 INJECTION, SOLUTION INTRAMUSCULAR; INTRAVENOUS at 06:45

## 2017-03-12 RX ADMIN — KETOROLAC TROMETHAMINE 60 MG: 60 INJECTION, SOLUTION INTRAMUSCULAR at 06:47

## 2017-03-12 RX ADMIN — ONDANSETRON 4 MG: 4 TABLET, ORALLY DISINTEGRATING ORAL at 07:54

## 2017-03-12 NOTE — ED PROVIDER NOTES
Subjective   Patient is a 57 y.o. female presenting with lower extremity pain.   History provided by:  Patient   used: No    Lower Extremity Issue   Location:  Hip and knee  Time since incident:  1 day  Injury: no    Hip location:  L hip  Knee location:  L knee  Pain details:     Quality:  Aching and throbbing    Severity:  Severe    Onset quality:  Gradual    Duration:  1 day    Timing:  Intermittent    Progression:  Waxing and waning  Chronicity:  New  Dislocation: no    Foreign body present:  No foreign bodies  Tetanus status:  Up to date  Prior injury to area:  Yes (prior joint replacement left knee 1 month ago)  Relieved by:  Nothing  Worsened by:  Adduction and bearing weight  Ineffective treatments:  NSAIDs, ice and elevation  Associated symptoms: decreased ROM, muscle weakness and stiffness        Review of Systems   Constitutional: Negative.    HENT: Negative.    Eyes: Negative.    Cardiovascular: Negative.    Gastrointestinal: Negative.    Endocrine: Negative.    Genitourinary: Negative.    Musculoskeletal: Positive for gait problem and stiffness.   Skin: Negative.    Allergic/Immunologic: Negative.    Hematological: Negative.    Psychiatric/Behavioral: Negative.    All other systems reviewed and are negative.      Past Medical History   Diagnosis Date   • Arthritis    • Disease of thyroid gland    • Fibromyalgia    • GERD (gastroesophageal reflux disease)    • Insomnia    • Migraine    • Nephrolithiasis    • PONV (postoperative nausea and vomiting)    • Spinal headache        Allergies   Allergen Reactions   • Codeine Hives and Itching       Past Surgical History   Procedure Laterality Date   • Hysterectomy     • Cholecystectomy     • Gastric banding     • Knee surgery     • Hand surgery     • Carpal tunnel release Bilateral    • Cranial neurostimulator insertion/replacement  01/09/2017     for migraines   • Abdominal surgery     • Eye surgery Bilateral      Cataract   • Joint  replacement  2012   • Joint replacement Right 02/14/2017     right total knee  [facility]  dr farah   • Pr total knee arthroplasty Right 2/14/2017     Procedure: TOTAL KNEE ARTHROPLASTY;  Surgeon: Wilfred Farah MD;  Location: [facility] OR;  Service: Orthopedics       Family History   Problem Relation Age of Onset   • Cancer Father    • Hypertension Father    • Hyperlipidemia Mother        Social History     Social History   • Marital status:      Spouse name: N/A   • Number of children: N/A   • Years of education: N/A     Social History Main Topics   • Smoking status: Never Smoker   • Smokeless tobacco: Never Used   • Alcohol use No   • Drug use: No   • Sexual activity: Defer     Other Topics Concern   • None     Social History Narrative           Objective   Physical Exam   Constitutional: She is oriented to person, place, and time. She appears well-developed and well-nourished.   HENT:   Head: Normocephalic and atraumatic.   Nose: Nose normal.   Mouth/Throat: Oropharynx is clear and moist.   Eyes: EOM are normal. Pupils are equal, round, and reactive to light.   Neck: Normal range of motion.   Cardiovascular: Normal rate, regular rhythm, normal heart sounds and intact distal pulses.    Pulmonary/Chest: Effort normal and breath sounds normal.   Abdominal: Soft. Bowel sounds are normal.   Musculoskeletal: She exhibits tenderness.   Tenderness to right lateral upper thigh. Pulses WNL, No distal edema, no calf tenderness or edema. No erythema.    Neurological: She is alert and oriented to person, place, and time.   Skin: Skin is warm and dry.   Psychiatric: She has a normal mood and affect. Her behavior is normal. Judgment and thought content normal.   Nursing note and vitals reviewed.      Procedures         ED Course  ED Course                  MDM    Final diagnoses:   None            Katelynn Tian, APRN  03/12/17 0646

## 2017-03-12 NOTE — ED NOTES
Pt is lying in bed, complains of pain 10/10 in her right leg, Camryn PAC notified, new orders noted.     Cierra Winters RN  03/12/17 0892

## 2017-03-12 NOTE — ED PROVIDER NOTES
Subjective   History of Present Illness  See Katelynn Tian's note for H&P  Review of Systems    Past Medical History   Diagnosis Date   • Arthritis    • Disease of thyroid gland    • Fibromyalgia    • GERD (gastroesophageal reflux disease)    • Insomnia    • Migraine    • Nephrolithiasis    • PONV (postoperative nausea and vomiting)    • Spinal headache        Allergies   Allergen Reactions   • Codeine Hives and Itching       Past Surgical History   Procedure Laterality Date   • Hysterectomy     • Cholecystectomy     • Gastric banding     • Knee surgery     • Hand surgery     • Carpal tunnel release Bilateral    • Cranial neurostimulator insertion/replacement  01/09/2017     for migraines   • Abdominal surgery     • Eye surgery Bilateral      Cataract   • Joint replacement  2012   • Joint replacement Right 02/14/2017     right total knee  [facility]  dr farah   • Pr total knee arthroplasty Right 2/14/2017     Procedure: TOTAL KNEE ARTHROPLASTY;  Surgeon: Wilfred Farah MD;  Location: [facility] OR;  Service: Orthopedics       Family History   Problem Relation Age of Onset   • Cancer Father    • Hypertension Father    • Hyperlipidemia Mother        Social History     Social History   • Marital status:      Spouse name: N/A   • Number of children: N/A   • Years of education: N/A     Social History Main Topics   • Smoking status: Never Smoker   • Smokeless tobacco: Never Used   • Alcohol use No   • Drug use: No   • Sexual activity: Defer     Other Topics Concern   • None     Social History Narrative           Objective   Physical Exam    Procedures         ED Course  ED Course   Comment By Time   Pt's x-rays show no acute findings, symptoms most likely due to bursitis of the hip.  Will discharge home on a NSAID and pain medication.  She will follow up with ortho this coming week. DESTINEY Espana 03/12 0813                  MDM  Number of Diagnoses or Management Options  Trochanteric bursitis, right hip:       Amount and/or Complexity of Data Reviewed  Tests in the radiology section of CPT®: reviewed  Discuss the patient with other providers: yes (Dr. Calderon)    Patient Progress  Patient progress: stable      Final diagnoses:   Trochanteric bursitis, right hip            DESTINEY Espana  03/12/17 0814

## 2017-03-12 NOTE — DISCHARGE INSTRUCTIONS

## 2017-03-15 ENCOUNTER — TELEPHONE (OUTPATIENT)
Dept: ORTHOPEDIC SURGERY | Facility: CLINIC | Age: 57
End: 2017-03-15

## 2017-03-15 NOTE — TELEPHONE ENCOUNTER
Patient had called 03/13/17 stating she had been to the ER at [facility] for her right hip pain, Dr. Randle was a AM clinic only on 03/13/17 we would get her in on Wednesday.   Called today 03/15/17 to see if patient could come in today concerning her right hip, she stated she had gone to her PCP on Tuesday 03/14/17 and received a cortisone injection and the right hip was feeling better, she was informed Dr. Randle would be on vacation next week, if she needed anything the PA's would be here and could help if needed.

## 2017-03-29 ENCOUNTER — OFFICE VISIT (OUTPATIENT)
Dept: ORTHOPEDIC SURGERY | Facility: CLINIC | Age: 57
End: 2017-03-29

## 2017-03-29 VITALS — WEIGHT: 240 LBS | BODY MASS INDEX: 37.67 KG/M2 | HEIGHT: 67 IN

## 2017-03-29 DIAGNOSIS — M70.61 TROCHANTERIC BURSITIS OF RIGHT HIP: Primary | ICD-10-CM

## 2017-03-29 DIAGNOSIS — Z96.651 STATUS POST TOTAL RIGHT KNEE REPLACEMENT: ICD-10-CM

## 2017-03-29 PROCEDURE — 20610 DRAIN/INJ JOINT/BURSA W/O US: CPT | Performed by: ORTHOPAEDIC SURGERY

## 2017-03-29 PROCEDURE — 99024 POSTOP FOLLOW-UP VISIT: CPT | Performed by: ORTHOPAEDIC SURGERY

## 2017-03-29 RX ORDER — METHYLPREDNISOLONE ACETATE 40 MG/ML
80 INJECTION, SUSPENSION INTRA-ARTICULAR; INTRALESIONAL; INTRAMUSCULAR; SOFT TISSUE
Status: COMPLETED | OUTPATIENT
Start: 2017-03-29 | End: 2017-03-29

## 2017-03-29 RX ORDER — LIDOCAINE HYDROCHLORIDE 20 MG/ML
2 INJECTION, SOLUTION INFILTRATION; PERINEURAL
Status: COMPLETED | OUTPATIENT
Start: 2017-03-29 | End: 2017-03-29

## 2017-03-29 RX ADMIN — METHYLPREDNISOLONE ACETATE 40 MG: 40 INJECTION, SUSPENSION INTRA-ARTICULAR; INTRALESIONAL; INTRAMUSCULAR; SOFT TISSUE at 08:27

## 2017-03-29 RX ADMIN — LIDOCAINE HYDROCHLORIDE 2 ML: 20 INJECTION, SOLUTION INFILTRATION; PERINEURAL at 08:27

## 2017-03-29 RX ADMIN — METHYLPREDNISOLONE ACETATE 80 MG: 40 INJECTION, SUSPENSION INTRA-ARTICULAR; INTRALESIONAL; INTRAMUSCULAR; SOFT TISSUE at 08:37

## 2017-03-29 RX ADMIN — LIDOCAINE HYDROCHLORIDE 2 ML: 20 INJECTION, SOLUTION INFILTRATION; PERINEURAL at 08:37

## 2017-03-29 NOTE — PROGRESS NOTES
Patient: Roseline Steiner  YOB: 1960  Date of Encounter: 03/29/2017        History of Present Illness:     57-year-old white female seen today for follow-up in regards to right total knee arthroplasty date of surgery 2/14/17. Patient states that she began to develop pain several weeks following surgery gradual onset lateral worse with ambulation and lying on the affected side. She denies specific  injury or fall.She has struggled with this in the past with trochanteric bursitis and reports that it has returned. She has followed up with her primary care physician which attempted an injection with minimal relief. She is requesting further evaluation. In reference to the right knee she continues to increase her ambulation and range of motion. She still walks with a slightly antalgic gait and is concerned that this may be affecting or putting stress on the hip.    Physical Exam: 57 y.o. female .  General Appearance:    Well appearing, cooperative, in no acute distress.       Patient is alert and oriented x 3.          Musculoskeletal: right knee incision clean,dry, and intact. Full range of motion. No instability. Quadriceps atrophy. Right hip on exam reveals full flexion with minimal pain with external and internal rotation. There is pain to palpation along the greater trochanteric bursa. No surrounding swelling, ecchymosis, erythema.       Procedure:  Right GT Bursa injection  Date/Time: 3/29/2017 8:37 AM  Consent given by: patient  Site marked: site marked  Supporting Documentation  Indications: pain and diagnostic evaluation   Procedure Details  Location: hip - R greater trochanteric bursaKnee joint: Right hip trochanteric bursa.  Needle size: 25 G (Spinal)  Approach: anterolateral  Medications administered: 2 mL lidocaine 2%; 80 mg methylPREDNISolone acetate 40 MG/ML  Patient tolerance: patient tolerated the procedure well with no immediate complications    Rt Hip Injection  Date/Time: 3/29/2017 8:27  AM  Consent given by: patient  Supporting Documentation  Indications: pain and diagnostic evaluation   Procedure Details  Location: hip - R greater trochanteric bursa  Needle size: 25 G (Spinal Needle)  Approach: lateral  Medications administered: 40 mg methylPREDNISolone acetate 40 MG/ML; 2 mL lidocaine 2%  Patient tolerance: patient tolerated the procedure well with no immediate complications          Assessment:    ICD-10-CM ICD-9-CM   1. Trochanteric bursitis of right hip M70.61 726.5   2. Status post total right knee replacement Z96.651 V43.65       Plan:    Situation was discussed with the patient in regards to the injection she tolerated well. She was instructed to return back next 4 weeks for further evaluation of the efficacy of the injection. No repeat x-rays of the knee will be necessary. She may continue with her home strengthening and therapy exercises as well as formal outpatient therapy.        Discussion and Summary:    Written by Geo Guillen PA-C, acting as scribe for Dr. Jer MCKEON, Wilfred Randle MD, personally performed the services described in this documentation as scribed by the above named individual in my presence, and it is both accurate and complete.  3/31/2017  9:37 AM      This document was signed by Geo Guillen PA-C March 31, 2017

## 2017-03-31 RX ORDER — METHYLPREDNISOLONE ACETATE 40 MG/ML
40 INJECTION, SUSPENSION INTRA-ARTICULAR; INTRALESIONAL; INTRAMUSCULAR; SOFT TISSUE
Status: COMPLETED | OUTPATIENT
Start: 2017-03-29 | End: 2017-03-29

## 2017-03-31 RX ORDER — LIDOCAINE HYDROCHLORIDE 20 MG/ML
2 INJECTION, SOLUTION INFILTRATION; PERINEURAL
Status: COMPLETED | OUTPATIENT
Start: 2017-03-29 | End: 2017-03-29

## 2017-04-26 ENCOUNTER — HOSPITAL ENCOUNTER (EMERGENCY)
Facility: HOSPITAL | Age: 57
Discharge: HOME | End: 2017-04-26
Attending: EMERGENCY MEDICINE | Admitting: EMERGENCY MEDICINE

## 2017-04-26 VITALS
TEMPERATURE: 98 F | DIASTOLIC BLOOD PRESSURE: 62 MMHG | HEIGHT: 67 IN | BODY MASS INDEX: 36.1 KG/M2 | RESPIRATION RATE: 18 BRPM | HEART RATE: 88 BPM | WEIGHT: 230 LBS | OXYGEN SATURATION: 99 % | SYSTOLIC BLOOD PRESSURE: 123 MMHG

## 2017-04-26 DIAGNOSIS — I10 ESSENTIAL HYPERTENSION: ICD-10-CM

## 2017-04-26 DIAGNOSIS — R04.0 EPISTAXIS: Primary | ICD-10-CM

## 2017-04-26 LAB
ANION GAP SERPL CALCULATED.3IONS-SCNC: 6.2 MMOL/L (ref 3.6–11.2)
BASOPHILS # BLD AUTO: 0.05 10*3/MM3 (ref 0–0.3)
BASOPHILS NFR BLD AUTO: 0.7 % (ref 0–2)
BUN BLD-MCNC: 16 MG/DL (ref 7–21)
BUN/CREAT SERPL: 15 (ref 7–25)
CALCIUM SPEC-SCNC: 9.3 MG/DL (ref 7.7–10)
CHLORIDE SERPL-SCNC: 106 MMOL/L (ref 99–112)
CO2 SERPL-SCNC: 29.8 MMOL/L (ref 24.3–31.9)
CREAT BLD-MCNC: 1.07 MG/DL (ref 0.43–1.29)
DEPRECATED RDW RBC AUTO: 45.6 FL (ref 37–54)
EOSINOPHIL # BLD AUTO: 0.46 10*3/MM3 (ref 0–0.7)
EOSINOPHIL NFR BLD AUTO: 6.4 % (ref 0–5)
ERYTHROCYTE [DISTWIDTH] IN BLOOD BY AUTOMATED COUNT: 14 % (ref 11.5–14.5)
GFR SERPL CREATININE-BSD FRML MDRD: 52 ML/MIN/1.73
GLUCOSE BLD-MCNC: 129 MG/DL (ref 70–110)
HCT VFR BLD AUTO: 39.3 % (ref 37–47)
HGB BLD-MCNC: 12.5 G/DL (ref 12–16)
HYPOCHROMIA BLD QL: NORMAL
IMM GRANULOCYTES # BLD: 0.11 10*3/MM3 (ref 0–0.03)
IMM GRANULOCYTES NFR BLD: 1.5 % (ref 0–0.5)
LYMPHOCYTES # BLD AUTO: 2.18 10*3/MM3 (ref 1–3)
LYMPHOCYTES NFR BLD AUTO: 30.3 % (ref 21–51)
MCH RBC QN AUTO: 29.7 PG (ref 27–33)
MCHC RBC AUTO-ENTMCNC: 31.8 G/DL (ref 33–37)
MCV RBC AUTO: 93.3 FL (ref 80–94)
MONOCYTES # BLD AUTO: 0.63 10*3/MM3 (ref 0.1–0.9)
MONOCYTES NFR BLD AUTO: 8.8 % (ref 0–10)
NEUTROPHILS # BLD AUTO: 3.76 10*3/MM3 (ref 1.4–6.5)
NEUTROPHILS NFR BLD AUTO: 52.3 % (ref 30–70)
OSMOLALITY SERPL CALC.SUM OF ELEC: 286 MOSM/KG (ref 273–305)
PLAT MORPH BLD: NORMAL
PLATELET # BLD AUTO: 245 10*3/MM3 (ref 130–400)
PMV BLD AUTO: 10.2 FL (ref 6–10)
POTASSIUM BLD-SCNC: 3.9 MMOL/L (ref 3.5–5.3)
RBC # BLD AUTO: 4.21 10*6/MM3 (ref 4.2–5.4)
SODIUM BLD-SCNC: 142 MMOL/L (ref 135–153)
WBC NRBC COR # BLD: 7.19 10*3/MM3 (ref 4.5–12.5)

## 2017-04-26 PROCEDURE — 85007 BL SMEAR W/DIFF WBC COUNT: CPT | Performed by: EMERGENCY MEDICINE

## 2017-04-26 PROCEDURE — 96374 THER/PROPH/DIAG INJ IV PUSH: CPT

## 2017-04-26 PROCEDURE — 80048 BASIC METABOLIC PNL TOTAL CA: CPT | Performed by: EMERGENCY MEDICINE

## 2017-04-26 PROCEDURE — 25010000002 LORAZEPAM PER 2 MG

## 2017-04-26 PROCEDURE — 96375 TX/PRO/DX INJ NEW DRUG ADDON: CPT

## 2017-04-26 PROCEDURE — 96361 HYDRATE IV INFUSION ADD-ON: CPT

## 2017-04-26 PROCEDURE — 99284 EMERGENCY DEPT VISIT MOD MDM: CPT

## 2017-04-26 PROCEDURE — 85025 COMPLETE CBC W/AUTO DIFF WBC: CPT | Performed by: EMERGENCY MEDICINE

## 2017-04-26 PROCEDURE — 25010000002 ONDANSETRON PER 1 MG: Performed by: EMERGENCY MEDICINE

## 2017-04-26 RX ORDER — ONDANSETRON 4 MG/1
4 TABLET, FILM COATED ORAL EVERY 6 HOURS
Qty: 10 TABLET | Refills: 0 | Status: SHIPPED | OUTPATIENT
Start: 2017-04-26 | End: 2018-06-15 | Stop reason: ALTCHOICE

## 2017-04-26 RX ORDER — ONDANSETRON 2 MG/ML
4 INJECTION INTRAMUSCULAR; INTRAVENOUS ONCE
Status: COMPLETED | OUTPATIENT
Start: 2017-04-26 | End: 2017-04-26

## 2017-04-26 RX ORDER — LORAZEPAM 2 MG/ML
1 INJECTION INTRAMUSCULAR ONCE
Status: COMPLETED | OUTPATIENT
Start: 2017-04-26 | End: 2017-04-26

## 2017-04-26 RX ORDER — LORAZEPAM 2 MG/ML
INJECTION INTRAMUSCULAR
Status: COMPLETED
Start: 2017-04-26 | End: 2017-04-26

## 2017-04-26 RX ORDER — SODIUM CHLORIDE 0.9 % (FLUSH) 0.9 %
10 SYRINGE (ML) INJECTION AS NEEDED
Status: DISCONTINUED | OUTPATIENT
Start: 2017-04-26 | End: 2017-04-26 | Stop reason: HOSPADM

## 2017-04-26 RX ADMIN — LORAZEPAM 1 MG: 2 INJECTION INTRAMUSCULAR; INTRAVENOUS at 19:12

## 2017-04-26 RX ADMIN — SILVER NITRATE APPLICATORS 1 APPLICATION: 25; 75 STICK TOPICAL at 17:45

## 2017-04-26 RX ADMIN — SODIUM CHLORIDE 1000 ML: 9 INJECTION, SOLUTION INTRAVENOUS at 18:13

## 2017-04-26 RX ADMIN — ONDANSETRON 4 MG: 2 INJECTION, SOLUTION INTRAMUSCULAR; INTRAVENOUS at 18:14

## 2017-04-26 RX ADMIN — LORAZEPAM 1 MG: 2 INJECTION INTRAMUSCULAR at 19:12

## 2017-05-04 ENCOUNTER — HOSPITAL ENCOUNTER (EMERGENCY)
Facility: HOSPITAL | Age: 57
Discharge: HOME | End: 2017-05-04
Attending: EMERGENCY MEDICINE | Admitting: EMERGENCY MEDICINE

## 2017-05-04 VITALS
RESPIRATION RATE: 16 BRPM | TEMPERATURE: 97.4 F | OXYGEN SATURATION: 96 % | BODY MASS INDEX: 36.1 KG/M2 | WEIGHT: 230 LBS | SYSTOLIC BLOOD PRESSURE: 126 MMHG | HEART RATE: 76 BPM | DIASTOLIC BLOOD PRESSURE: 67 MMHG | HEIGHT: 67 IN

## 2017-05-04 DIAGNOSIS — R04.0 LEFT-SIDED NOSEBLEED: Primary | ICD-10-CM

## 2017-05-04 PROCEDURE — 25010000002 PROCHLORPERAZINE EDISYLATE PER 10 MG: Performed by: EMERGENCY MEDICINE

## 2017-05-04 PROCEDURE — 96374 THER/PROPH/DIAG INJ IV PUSH: CPT

## 2017-05-04 PROCEDURE — 99285 EMERGENCY DEPT VISIT HI MDM: CPT

## 2017-05-04 RX ORDER — SODIUM CHLORIDE 0.9 % (FLUSH) 0.9 %
10 SYRINGE (ML) INJECTION AS NEEDED
Status: DISCONTINUED | OUTPATIENT
Start: 2017-05-04 | End: 2017-05-04 | Stop reason: HOSPADM

## 2017-05-04 RX ORDER — HYDROCODONE BITARTRATE AND ACETAMINOPHEN 7.5; 325 MG/1; MG/1
1 TABLET ORAL ONCE
Status: COMPLETED | OUTPATIENT
Start: 2017-05-04 | End: 2017-05-04

## 2017-05-04 RX ORDER — HYDROCODONE BITARTRATE AND ACETAMINOPHEN 5; 325 MG/1; MG/1
1 TABLET ORAL EVERY 6 HOURS PRN
Qty: 12 TABLET | Refills: 0 | Status: SHIPPED | OUTPATIENT
Start: 2017-05-04 | End: 2018-06-15 | Stop reason: ALTCHOICE

## 2017-05-04 RX ORDER — LORAZEPAM 1 MG/1
1 TABLET ORAL ONCE
Status: COMPLETED | OUTPATIENT
Start: 2017-05-04 | End: 2017-05-04

## 2017-05-04 RX ORDER — KETOROLAC TROMETHAMINE 30 MG/ML
INJECTION, SOLUTION INTRAMUSCULAR; INTRAVENOUS
Status: DISCONTINUED
Start: 2017-05-04 | End: 2017-05-04 | Stop reason: WASHOUT

## 2017-05-04 RX ADMIN — SILVER NITRATE APPLICATORS 1 APPLICATION: 25; 75 STICK TOPICAL at 12:44

## 2017-05-04 RX ADMIN — LORAZEPAM 1 MG: 1 TABLET ORAL at 14:47

## 2017-05-04 RX ADMIN — PROCHLORPERAZINE EDISYLATE 10 MG: 5 INJECTION INTRAMUSCULAR; INTRAVENOUS at 16:06

## 2017-05-04 RX ADMIN — COCAINE HYDROCHLORIDE: 40 SOLUTION TOPICAL at 12:17

## 2017-05-04 RX ADMIN — HYDROCODONE BITARTRATE AND ACETAMINOPHEN 1 TABLET: 7.5; 325 TABLET ORAL at 13:20

## 2018-05-20 ENCOUNTER — APPOINTMENT (OUTPATIENT)
Dept: CT IMAGING | Facility: HOSPITAL | Age: 58
End: 2018-05-20

## 2018-05-20 ENCOUNTER — HOSPITAL ENCOUNTER (EMERGENCY)
Facility: HOSPITAL | Age: 58
Discharge: HOME | End: 2018-05-20
Admitting: NURSE PRACTITIONER

## 2018-05-20 ENCOUNTER — APPOINTMENT (OUTPATIENT)
Dept: GENERAL RADIOLOGY | Facility: HOSPITAL | Age: 58
End: 2018-05-20

## 2018-05-20 VITALS
OXYGEN SATURATION: 98 % | HEART RATE: 83 BPM | TEMPERATURE: 98.2 F | RESPIRATION RATE: 18 BRPM | HEIGHT: 67 IN | SYSTOLIC BLOOD PRESSURE: 112 MMHG | WEIGHT: 240 LBS | BODY MASS INDEX: 37.67 KG/M2 | DIASTOLIC BLOOD PRESSURE: 74 MMHG

## 2018-05-20 DIAGNOSIS — R51.9 ACUTE NONINTRACTABLE HEADACHE, UNSPECIFIED HEADACHE TYPE: Primary | ICD-10-CM

## 2018-05-20 PROCEDURE — 70450 CT HEAD/BRAIN W/O DYE: CPT | Performed by: RADIOLOGY

## 2018-05-20 PROCEDURE — 93005 ELECTROCARDIOGRAM TRACING: CPT | Performed by: NURSE PRACTITIONER

## 2018-05-20 PROCEDURE — 96372 THER/PROPH/DIAG INJ SC/IM: CPT

## 2018-05-20 PROCEDURE — 99284 EMERGENCY DEPT VISIT MOD MDM: CPT

## 2018-05-20 PROCEDURE — 70160 X-RAY EXAM OF NASAL BONES: CPT | Performed by: RADIOLOGY

## 2018-05-20 PROCEDURE — 25010000002 PROMETHAZINE PER 50 MG: Performed by: NURSE PRACTITIONER

## 2018-05-20 PROCEDURE — 25010000002 HYDROMORPHONE PER 4 MG: Performed by: NURSE PRACTITIONER

## 2018-05-20 PROCEDURE — 70450 CT HEAD/BRAIN W/O DYE: CPT

## 2018-05-20 PROCEDURE — 70160 X-RAY EXAM OF NASAL BONES: CPT

## 2018-05-20 RX ORDER — HYDROMORPHONE HCL 110MG/55ML
1 PATIENT CONTROLLED ANALGESIA SYRINGE INTRAVENOUS ONCE
Status: COMPLETED | OUTPATIENT
Start: 2018-05-20 | End: 2018-05-20

## 2018-05-20 RX ORDER — PROMETHAZINE HYDROCHLORIDE 25 MG/ML
12.5 INJECTION, SOLUTION INTRAMUSCULAR; INTRAVENOUS ONCE
Status: COMPLETED | OUTPATIENT
Start: 2018-05-20 | End: 2018-05-20

## 2018-05-20 RX ADMIN — PROMETHAZINE HYDROCHLORIDE 12.5 MG: 25 INJECTION INTRAMUSCULAR; INTRAVENOUS at 12:12

## 2018-05-20 RX ADMIN — HYDROMORPHONE HYDROCHLORIDE 1 MG: 2 INJECTION INTRAMUSCULAR; INTRAVENOUS; SUBCUTANEOUS at 12:12

## 2018-05-20 NOTE — ED NOTES
Pt voiced a complaint of epigastric discomfort. Pt was placed on bedside monitor and 12 lead     Kvng Cook RN  05/20/18 6977

## 2018-05-20 NOTE — ED PROVIDER NOTES
Subjective     History provided by:  Patient   used: No    Headache   Pain location:  Generalized  Quality:  Dull  Radiates to:  Does not radiate  Severity currently:  8/10  Severity at highest:  8/10  Onset quality:  Gradual  Duration:  2 weeks  Timing:  Constant  Progression:  Waxing and waning  Chronicity:  New  Similar to prior headaches: no    Context: not activity, not exposure to bright light, not caffeine, not defecating, not eating, not stress, not intercourse, not loud noise and not straining    Relieved by:  Nothing  Worsened by:  Nothing  Ineffective treatments:  None tried  Associated symptoms: no abdominal pain, no back pain, no congestion, no cough, no diarrhea, no dizziness, no drainage, no ear pain, no eye pain, no facial pain, no fatigue, no fever, no focal weakness, no hearing loss, no loss of balance, no myalgias, no near-syncope, no neck pain, no neck stiffness, no numbness, no paresthesias, no photophobia, no seizures, no sore throat, no swollen glands, no syncope, no tingling, no URI and no visual change        Review of Systems   Constitutional: Negative.  Negative for fatigue and fever.   HENT: Negative for congestion, ear pain, hearing loss, postnasal drip and sore throat.    Eyes: Negative.  Negative for photophobia and pain.   Respiratory: Negative.  Negative for cough.    Cardiovascular: Negative.  Negative for syncope and near-syncope.   Gastrointestinal: Negative.  Negative for abdominal pain and diarrhea.   Endocrine: Negative.    Musculoskeletal: Negative.  Negative for back pain, myalgias, neck pain and neck stiffness.   Skin: Negative.    Allergic/Immunologic: Negative.    Neurological: Positive for headaches. Negative for dizziness, focal weakness, seizures, numbness, paresthesias and loss of balance.   Hematological: Negative.    Psychiatric/Behavioral: Negative.        Past Medical History:   Diagnosis Date   • Arthritis    • Disease of thyroid gland    •  Fibromyalgia    • GERD (gastroesophageal reflux disease)    • Insomnia    • Migraine    • Nephrolithiasis    • PONV (postoperative nausea and vomiting)    • Spinal headache        Allergies   Allergen Reactions   • Codeine Hives and Itching       Past Surgical History:   Procedure Laterality Date   • ABDOMINAL SURGERY     • CARPAL TUNNEL RELEASE Bilateral    • CHOLECYSTECTOMY     • CRANIAL NEUROSTIMULATOR INSERTION/REPLACEMENT  01/09/2017    for migraines   • EYE SURGERY Bilateral     Cataract   • GASTRIC BANDING     • HAND SURGERY     • HYSTERECTOMY     • JOINT REPLACEMENT  2012   • JOINT REPLACEMENT Right 02/14/2017    right total knee  [facility]  dr farah   • KNEE SURGERY     • HI TOTAL KNEE ARTHROPLASTY Right 2/14/2017    Procedure: TOTAL KNEE ARTHROPLASTY;  Surgeon: Wilfred Farah MD;  Location: [facility];  Service: Orthopedics       Family History   Problem Relation Age of Onset   • Cancer Father    • Hypertension Father    • Hyperlipidemia Mother        Social History     Social History   • Marital status:      Social History Main Topics   • Smoking status: Never Smoker   • Smokeless tobacco: Never Used   • Alcohol use No   • Drug use: No   • Sexual activity: Defer     Other Topics Concern   • Not on file           Objective   Physical Exam   Constitutional: She is oriented to person, place, and time. She appears well-developed and well-nourished.   HENT:   Head: Normocephalic.   Right Ear: External ear normal.   Left Ear: External ear normal.   Mouth/Throat: Oropharynx is clear and moist.   Eyes: EOM are normal. Pupils are equal, round, and reactive to light.   Neck: Normal range of motion. Neck supple.   Cardiovascular: Normal rate and regular rhythm.    Pulmonary/Chest: Effort normal and breath sounds normal.   Abdominal: Soft. Bowel sounds are normal.   Musculoskeletal: Normal range of motion.   Neurological: She is alert and oriented to person, place, and time.   Skin: Skin is warm and dry.  Capillary refill takes less than 2 seconds.   Psychiatric: She has a normal mood and affect. Her behavior is normal.   Nursing note and vitals reviewed.      Procedures           ED Course                  MDM      Final diagnoses:   Acute nonintractable headache, unspecified headache type            Manoj Roberson, APRN  05/20/18 2125

## 2018-05-27 ENCOUNTER — APPOINTMENT (OUTPATIENT)
Dept: GENERAL RADIOLOGY | Facility: HOSPITAL | Age: 58
End: 2018-05-27

## 2018-05-27 ENCOUNTER — HOSPITAL ENCOUNTER (EMERGENCY)
Facility: HOSPITAL | Age: 58
Discharge: HOME | End: 2018-05-28
Attending: EMERGENCY MEDICINE | Admitting: EMERGENCY MEDICINE

## 2018-05-27 DIAGNOSIS — L03.119 CELLULITIS OF KNEE: Primary | ICD-10-CM

## 2018-05-27 LAB
ALBUMIN SERPL-MCNC: 4.6 G/DL (ref 3.4–4.8)
ALBUMIN/GLOB SERPL: 1.5 G/DL (ref 1.5–2.5)
ALP SERPL-CCNC: 77 U/L (ref 35–104)
ALT SERPL W P-5'-P-CCNC: 35 U/L (ref 10–36)
ANION GAP SERPL CALCULATED.3IONS-SCNC: 6.4 MMOL/L (ref 3.6–11.2)
AST SERPL-CCNC: 22 U/L (ref 10–30)
BASOPHILS # BLD AUTO: 0.03 10*3/MM3 (ref 0–0.3)
BASOPHILS NFR BLD AUTO: 0.3 % (ref 0–2)
BILIRUB SERPL-MCNC: 0.7 MG/DL (ref 0.2–1.8)
BUN BLD-MCNC: 12 MG/DL (ref 7–21)
BUN/CREAT SERPL: 15 (ref 7–25)
CALCIUM SPEC-SCNC: 9.6 MG/DL (ref 7.7–10)
CHLORIDE SERPL-SCNC: 103 MMOL/L (ref 99–112)
CO2 SERPL-SCNC: 25.6 MMOL/L (ref 24.3–31.9)
CREAT BLD-MCNC: 0.8 MG/DL (ref 0.43–1.29)
DEPRECATED RDW RBC AUTO: 47.4 FL (ref 37–54)
EOSINOPHIL # BLD AUTO: 0.05 10*3/MM3 (ref 0–0.7)
EOSINOPHIL NFR BLD AUTO: 0.5 % (ref 0–5)
ERYTHROCYTE [DISTWIDTH] IN BLOOD BY AUTOMATED COUNT: 14.3 % (ref 11.5–14.5)
GFR SERPL CREATININE-BSD FRML MDRD: 73 ML/MIN/1.73
GLOBULIN UR ELPH-MCNC: 3 GM/DL
GLUCOSE BLD-MCNC: 121 MG/DL (ref 70–110)
HCT VFR BLD AUTO: 43.3 % (ref 37–47)
HGB BLD-MCNC: 14.1 G/DL (ref 12–16)
IMM GRANULOCYTES # BLD: 0.03 10*3/MM3 (ref 0–0.03)
IMM GRANULOCYTES NFR BLD: 0.3 % (ref 0–0.5)
LYMPHOCYTES # BLD AUTO: 1.36 10*3/MM3 (ref 1–3)
LYMPHOCYTES NFR BLD AUTO: 13.1 % (ref 21–51)
MCH RBC QN AUTO: 31 PG (ref 27–33)
MCHC RBC AUTO-ENTMCNC: 32.6 G/DL (ref 33–37)
MCV RBC AUTO: 95.2 FL (ref 80–94)
MONOCYTES # BLD AUTO: 0.97 10*3/MM3 (ref 0.1–0.9)
MONOCYTES NFR BLD AUTO: 9.3 % (ref 0–10)
NEUTROPHILS # BLD AUTO: 7.96 10*3/MM3 (ref 1.4–6.5)
NEUTROPHILS NFR BLD AUTO: 76.5 % (ref 30–70)
OSMOLALITY SERPL CALC.SUM OF ELEC: 271.1 MOSM/KG (ref 273–305)
PLATELET # BLD AUTO: 247 10*3/MM3 (ref 130–400)
PMV BLD AUTO: 9.8 FL (ref 6–10)
POTASSIUM BLD-SCNC: 3.8 MMOL/L (ref 3.5–5.3)
PROT SERPL-MCNC: 7.6 G/DL (ref 6–8)
RBC # BLD AUTO: 4.55 10*6/MM3 (ref 4.2–5.4)
SODIUM BLD-SCNC: 135 MMOL/L (ref 135–153)
WBC NRBC COR # BLD: 10.4 10*3/MM3 (ref 4.5–12.5)

## 2018-05-27 PROCEDURE — 87040 BLOOD CULTURE FOR BACTERIA: CPT | Performed by: EMERGENCY MEDICINE

## 2018-05-27 PROCEDURE — 99284 EMERGENCY DEPT VISIT MOD MDM: CPT

## 2018-05-27 PROCEDURE — 36415 COLL VENOUS BLD VENIPUNCTURE: CPT

## 2018-05-27 PROCEDURE — 73562 X-RAY EXAM OF KNEE 3: CPT | Performed by: RADIOLOGY

## 2018-05-27 PROCEDURE — 73562 X-RAY EXAM OF KNEE 3: CPT

## 2018-05-27 PROCEDURE — 80053 COMPREHEN METABOLIC PANEL: CPT | Performed by: EMERGENCY MEDICINE

## 2018-05-27 PROCEDURE — 85025 COMPLETE CBC W/AUTO DIFF WBC: CPT | Performed by: EMERGENCY MEDICINE

## 2018-05-27 RX ORDER — OXYCODONE AND ACETAMINOPHEN 10; 325 MG/1; MG/1
1 TABLET ORAL ONCE
Status: COMPLETED | OUTPATIENT
Start: 2018-05-27 | End: 2018-05-27

## 2018-05-27 RX ORDER — IBUPROFEN 600 MG/1
600 TABLET ORAL ONCE
Status: COMPLETED | OUTPATIENT
Start: 2018-05-27 | End: 2018-05-27

## 2018-05-27 RX ORDER — HYDROCODONE BITARTRATE AND ACETAMINOPHEN 7.5; 325 MG/1; MG/1
1 TABLET ORAL ONCE
Status: DISCONTINUED | OUTPATIENT
Start: 2018-05-27 | End: 2018-05-27

## 2018-05-27 RX ADMIN — OXYCODONE HYDROCHLORIDE AND ACETAMINOPHEN 1 TABLET: 10; 325 TABLET ORAL at 22:39

## 2018-05-27 RX ADMIN — IBUPROFEN 600 MG: 600 TABLET ORAL at 22:39

## 2018-05-28 VITALS
OXYGEN SATURATION: 98 % | SYSTOLIC BLOOD PRESSURE: 112 MMHG | HEART RATE: 88 BPM | TEMPERATURE: 98.9 F | BODY MASS INDEX: 37.67 KG/M2 | HEIGHT: 67 IN | WEIGHT: 240 LBS | RESPIRATION RATE: 18 BRPM | DIASTOLIC BLOOD PRESSURE: 65 MMHG

## 2018-05-28 PROCEDURE — 25010000002 KETOROLAC TROMETHAMINE PER 15 MG: Performed by: EMERGENCY MEDICINE

## 2018-05-28 PROCEDURE — 96372 THER/PROPH/DIAG INJ SC/IM: CPT

## 2018-05-28 RX ORDER — CIPROFLOXACIN 500 MG/1
500 TABLET, FILM COATED ORAL 2 TIMES DAILY
Qty: 14 TABLET | Refills: 0 | Status: SHIPPED | OUTPATIENT
Start: 2018-05-28 | End: 2018-06-15 | Stop reason: ALTCHOICE

## 2018-05-28 RX ORDER — KETOROLAC TROMETHAMINE 30 MG/ML
30 INJECTION, SOLUTION INTRAMUSCULAR; INTRAVENOUS ONCE
Status: COMPLETED | OUTPATIENT
Start: 2018-05-28 | End: 2018-05-28

## 2018-05-28 RX ORDER — CIPROFLOXACIN 500 MG/1
500 TABLET, FILM COATED ORAL ONCE
Status: COMPLETED | OUTPATIENT
Start: 2018-05-28 | End: 2018-05-28

## 2018-05-28 RX ADMIN — KETOROLAC TROMETHAMINE 30 MG: 30 INJECTION, SOLUTION INTRAMUSCULAR; INTRAVENOUS at 00:19

## 2018-05-28 RX ADMIN — CIPROFLOXACIN 500 MG: 500 TABLET, FILM COATED ORAL at 00:19

## 2018-06-01 LAB
BACTERIA SPEC AEROBE CULT: NORMAL
BACTERIA SPEC AEROBE CULT: NORMAL

## 2018-06-04 ENCOUNTER — OFFICE VISIT (OUTPATIENT)
Dept: ORTHOPEDIC SURGERY | Facility: CLINIC | Age: 58
End: 2018-06-04

## 2018-06-04 DIAGNOSIS — Z96.651 STATUS POST TOTAL RIGHT KNEE REPLACEMENT: ICD-10-CM

## 2018-06-04 DIAGNOSIS — S82.001A CLOSED NONDISPLACED FRACTURE OF RIGHT PATELLA, UNSPECIFIED FRACTURE MORPHOLOGY, INITIAL ENCOUNTER: Primary | ICD-10-CM

## 2018-06-04 PROCEDURE — 27520 TREAT KNEECAP FRACTURE: CPT | Performed by: ORTHOPAEDIC SURGERY

## 2018-06-04 NOTE — PROGRESS NOTES
Follow-up Visit         Patient: Roseline Steiner  YOB: 1960  Date of Encounter: 06/04/2018      HPI:  Roseline Steiner, 58 y.o. female seen today in follow up presents with acute injury to right knee sustained approximately 8 days ago.  She was walking down steps when she missed a step and fell, landing directly on her right knee.  She had immediate pain, has continued to experience pain described as a stabbing sensation when she attempts to ambulate.  She underwent left total knee arthroplasty February 14, 2014.  She has done well with this essentially with no problems until this injury.    Medical History:  Patient Active Problem List   Diagnosis   • Migraine with status migrainosus   • Arthralgia of left hip   • Greater trochanteric bursitis   • Primary osteoarthritis of right knee   • Closed nondisplaced fracture of right patella     Past Medical History:   Diagnosis Date   • Arthritis    • Disease of thyroid gland    • Fibromyalgia    • GERD (gastroesophageal reflux disease)    • Insomnia    • Migraine    • Nephrolithiasis    • PONV (postoperative nausea and vomiting)    • Spinal headache        Social History:  Social History     Social History   • Marital status:      Spouse name: N/A   • Number of children: N/A   • Years of education: N/A     Occupational History   • Not on file.     Social History Main Topics   • Smoking status: Never Smoker   • Smokeless tobacco: Never Used   • Alcohol use No   • Drug use: No   • Sexual activity: Defer     Other Topics Concern   • Not on file     Social History Narrative   • No narrative on file   Patient's Body mass index is 37.64 kg/m². BMI is above normal parameters. Recommendations include: educational material.      Surgical History:  Past Surgical History:   Procedure Laterality Date   • ABDOMINAL SURGERY     • CARPAL TUNNEL RELEASE Bilateral    • CHOLECYSTECTOMY     • CRANIAL NEUROSTIMULATOR INSERTION/REPLACEMENT  01/09/2017    for migraines    • EYE SURGERY Bilateral     Cataract   • GASTRIC BANDING     • HAND SURGERY     • HYSTERECTOMY     • JOINT REPLACEMENT  2012   • JOINT REPLACEMENT Right 02/14/2017    right total knee  [facility]  dr farah   • KNEE SURGERY     • PA TOTAL KNEE ARTHROPLASTY Right 2/14/2017    Procedure: TOTAL KNEE ARTHROPLASTY;  Surgeon: Wilfred Farah MD;  Location: [facility];  Service: Orthopedics       Radiology:  Right knee x-rays show transverse fracture involving the inferior most portion of the patella and the fragment is mildly angulated and rotated.    Examination:  Right Knee: Effusion with moderate tenderness along the distal pole of patella pain with attempted flexion of the knee, no medial or lateral joint line tenderness, no instability with varus valgus stressing, Lackman and drawer, negative neurovascular is grossly intact.      Assessment:   58-year-old female with transverse fracture through the inferior pole of the patella superimposed on right total knee replacement without patellar replacement February 2017.  I think the fragment is stable as evidenced by the fact that she has ambulated for 8 days.  We will therefore treat her with prefabricated knee immobilizer. She will wear this when ambulating but is permitted to remove this at rest. She is also provided crutches.  She will try to limit her weightbearing.  She will follow-up in 4 weeks' time we will repeat x-rays, AP lateral right knee. She'll return sooner with any increase in pain.       Diagnosis Plan   1. Closed nondisplaced fracture of right patella, unspecified fracture morphology, initial encounter     2. Status post total right knee replacement         Plan:  She will follow up in 4 weeks with repeat x-rays AP lateral right knee      Cc:  Samuel Duane Kreis, MD    Scribed for Wilfred Farah MD by Briseida Farah RN.2:56 PM 06/04/2018

## 2018-06-05 VITALS — HEIGHT: 67 IN | BODY MASS INDEX: 37.72 KG/M2 | WEIGHT: 240.3 LBS

## 2018-06-15 ENCOUNTER — OFFICE VISIT (OUTPATIENT)
Dept: ORTHOPEDIC SURGERY | Facility: CLINIC | Age: 58
End: 2018-06-15

## 2018-06-15 ENCOUNTER — HOSPITAL ENCOUNTER (OUTPATIENT)
Dept: GENERAL RADIOLOGY | Facility: HOSPITAL | Age: 58
Discharge: HOME | End: 2018-06-15
Attending: ORTHOPAEDIC SURGERY | Admitting: ORTHOPAEDIC SURGERY

## 2018-06-15 VITALS — WEIGHT: 240 LBS | HEIGHT: 67 IN | BODY MASS INDEX: 37.67 KG/M2

## 2018-06-15 DIAGNOSIS — Z96.651 STATUS POST TOTAL RIGHT KNEE REPLACEMENT: ICD-10-CM

## 2018-06-15 DIAGNOSIS — S82.091D OTHER CLOSED FRACTURE OF RIGHT PATELLA WITH ROUTINE HEALING, SUBSEQUENT ENCOUNTER: Primary | ICD-10-CM

## 2018-06-15 DIAGNOSIS — S82.001A CLOSED NONDISPLACED FRACTURE OF RIGHT PATELLA, UNSPECIFIED FRACTURE MORPHOLOGY, INITIAL ENCOUNTER: Primary | ICD-10-CM

## 2018-06-15 PROCEDURE — 99024 POSTOP FOLLOW-UP VISIT: CPT | Performed by: ORTHOPAEDIC SURGERY

## 2018-06-15 PROCEDURE — 73560 X-RAY EXAM OF KNEE 1 OR 2: CPT | Performed by: RADIOLOGY

## 2018-06-15 PROCEDURE — 73562 X-RAY EXAM OF KNEE 3: CPT

## 2018-06-15 RX ORDER — NORTRIPTYLINE HYDROCHLORIDE 25 MG/1
CAPSULE ORAL
Refills: 2 | COMMUNITY
Start: 2018-06-09

## 2018-06-15 RX ORDER — CHOLECALCIFEROL (VITAMIN D3) 125 MCG
500 CAPSULE ORAL DAILY
Refills: 1 | COMMUNITY
Start: 2018-05-30

## 2018-06-15 RX ORDER — OXYCODONE HYDROCHLORIDE AND ACETAMINOPHEN 5; 325 MG/1; MG/1
TABLET ORAL
Refills: 0 | COMMUNITY
Start: 2018-05-25 | End: 2018-06-15 | Stop reason: ALTCHOICE

## 2018-06-15 RX ORDER — BACLOFEN 10 MG/1
TABLET ORAL
Refills: 2 | COMMUNITY
Start: 2018-06-09

## 2018-06-15 RX ORDER — HYDROCODONE BITARTRATE AND ACETAMINOPHEN 7.5; 325 MG/1; MG/1
1 TABLET ORAL EVERY 6 HOURS PRN
Qty: 40 TABLET | Refills: 0 | OUTPATIENT
Start: 2018-06-15 | End: 2019-09-15

## 2018-06-15 RX ORDER — FEXOFENADINE HCL 180 MG/1
TABLET ORAL
Refills: 0 | COMMUNITY
Start: 2018-05-21 | End: 2018-06-15 | Stop reason: ALTCHOICE

## 2018-06-15 RX ORDER — MECLIZINE HYDROCHLORIDE 25 MG/1
TABLET ORAL
Refills: 0 | COMMUNITY
Start: 2018-05-21 | End: 2018-06-15 | Stop reason: ALTCHOICE

## 2018-06-15 NOTE — PROGRESS NOTES
Follow-up Visit         Patient: Roseline Steiner  YOB: 1960  Date of Encounter: 06/15/2018      HPI:  Roseline Steiner, 58 y.o. female returns today with right patella fracture sustained on 05/27/2018. She was placed in a knee immobilizer on 06/04/2018.  She is struggling with pain in her knee but has been able to limit her weightbearing and is essentially keeping her knee in a knee immobilizer fully extended.  She has moderate pain over the medial aspect of her knee as well as inferior portion of her patella    Medical History:  Patient Active Problem List   Diagnosis   • Migraine with status migrainosus   • Arthralgia of left hip   • Greater trochanteric bursitis   • Primary osteoarthritis of right knee   • Closed nondisplaced fracture of right patella     Past Medical History:   Diagnosis Date   • Arthritis    • Disease of thyroid gland    • Fibromyalgia    • GERD (gastroesophageal reflux disease)    • Insomnia    • Migraine    • Nephrolithiasis    • PONV (postoperative nausea and vomiting)    • Spinal headache        Social History:  Social History     Social History   • Marital status:      Spouse name: N/A   • Number of children: N/A   • Years of education: N/A     Occupational History   • Not on file.     Social History Main Topics   • Smoking status: Never Smoker   • Smokeless tobacco: Never Used   • Alcohol use No   • Drug use: No   • Sexual activity: Defer     Other Topics Concern   • Not on file     Social History Narrative   • No narrative on file       Surgical History:  Past Surgical History:   Procedure Laterality Date   • ABDOMINAL SURGERY     • CARPAL TUNNEL RELEASE Bilateral    • CHOLECYSTECTOMY     • CRANIAL NEUROSTIMULATOR INSERTION/REPLACEMENT  01/09/2017    for migraines   • EYE SURGERY Bilateral     Cataract   • GASTRIC BANDING     • HAND SURGERY     • HYSTERECTOMY     • JOINT REPLACEMENT  2012   • JOINT REPLACEMENT Right 02/14/2017    right total knee  [facility]  dr farah    • KNEE SURGERY     • SD TOTAL KNEE ARTHROPLASTY Right 2/14/2017    Procedure: TOTAL KNEE ARTHROPLASTY;  Surgeon: Wilfred Randle MD;  [facility];  Service: Orthopedics       Radiology:  X-Ray Right Knee: Ap/Lateral views today show no change in alignment of the mildly rotated inferior pole patella fracture.     Examination:  Right Knee: No significant effusion, moderate tenderness along the inferior pole of patella, moderate tenderness along the anterior portion medial femoral condyle.    Assessment & Plan:  58 y.o. female now approximally 3 weeks status post fracture involving inferior pole of patella from direct fall.  She is to continue with her knee immobilizer with her leg in full extension except for bathing purposes.  She is given Norco 7.5 #40.  She will follow-up in 3 weeks' time.     Diagnosis Plan   1. Closed nondisplaced fracture of right patella, unspecified fracture morphology, initial encounter     2. Status post total right knee replacement             Cc:  Samuel Duane Kreis, MD    Scribed for Wilfred Randle MD by Briseida Randle RN.8:49 AM 06/15/2018

## 2018-07-05 DIAGNOSIS — S82.091D OTHER CLOSED FRACTURE OF RIGHT PATELLA WITH ROUTINE HEALING, SUBSEQUENT ENCOUNTER: Primary | ICD-10-CM

## 2018-07-09 ENCOUNTER — OFFICE VISIT (OUTPATIENT)
Dept: ORTHOPEDIC SURGERY | Facility: CLINIC | Age: 58
End: 2018-07-09

## 2018-07-09 ENCOUNTER — HOSPITAL ENCOUNTER (OUTPATIENT)
Dept: GENERAL RADIOLOGY | Facility: HOSPITAL | Age: 58
Discharge: HOME | End: 2018-07-09
Attending: ORTHOPAEDIC SURGERY | Admitting: ORTHOPAEDIC SURGERY

## 2018-07-09 VITALS — WEIGHT: 240 LBS | HEIGHT: 67 IN | BODY MASS INDEX: 37.67 KG/M2

## 2018-07-09 DIAGNOSIS — S82.091D OTHER CLOSED FRACTURE OF RIGHT PATELLA WITH ROUTINE HEALING, SUBSEQUENT ENCOUNTER: ICD-10-CM

## 2018-07-09 DIAGNOSIS — S82.091D OTHER CLOSED FRACTURE OF RIGHT PATELLA WITH ROUTINE HEALING, SUBSEQUENT ENCOUNTER: Primary | ICD-10-CM

## 2018-07-09 PROCEDURE — 73560 X-RAY EXAM OF KNEE 1 OR 2: CPT | Performed by: RADIOLOGY

## 2018-07-09 PROCEDURE — 73560 X-RAY EXAM OF KNEE 1 OR 2: CPT

## 2018-07-09 PROCEDURE — 99024 POSTOP FOLLOW-UP VISIT: CPT | Performed by: ORTHOPAEDIC SURGERY

## 2018-07-09 RX ORDER — VERAPAMIL HYDROCHLORIDE 200 MG/1
CAPSULE, EXTENDED RELEASE ORAL
Refills: 5 | COMMUNITY
Start: 2018-07-05

## 2018-07-09 NOTE — PROGRESS NOTES
Follow-up Visit         Patient: Roseline Steiner  YOB: 1960  Date of Encounter: 07/09/2018      HPI:  Roseline Steiner, 58 y.o. female seen today in follow up for fracture of right patella sustained on 05/27/2018, now 6 weeks out.  She reports no improvement whatsoever in her knee pain.  She has been prescribed extension brace which she wears the majority of the time.  She localizes pain more to the medial aspect of her knee rather than to the inferior patella.    Medical History:  Patient Active Problem List   Diagnosis   • Migraine with status migrainosus   • Arthralgia of left hip   • Greater trochanteric bursitis   • Primary osteoarthritis of right knee   • Closed nondisplaced fracture of right patella     Past Medical History:   Diagnosis Date   • Arthritis    • Disease of thyroid gland    • Fibromyalgia    • GERD (gastroesophageal reflux disease)    • Insomnia    • Migraine    • Nephrolithiasis    • PONV (postoperative nausea and vomiting)    • Spinal headache        Radiology:  Right Knee 3 views today show past fragment off the inferior pole patella now rotated and appears consolidated compared to previous radiographs.  Has been no migration of the fragment.      Examination:  Right Knee: No effusion.  Mild tenderness inferior pole of patella.  She demonstrates full flexion full extension.  She has moderate pain along the medial aspect of the knee from the anterior to posterior along the joint line as well as proximal to the joint line by 2 cm along the medial femoral condyle.    Assessment:   58 y.o. female right knee injury and avulsed fragment inferior pole of patella nondisplaced.  Pain location is different than previous and medial than midline.  I suspect her pain is generated from trauma other than the inferior patellar pole fragment.  We agreed tenuous conservative management.     Diagnosis Plan   1. Other closed fracture of right patella with routine healing, subsequent encounter          Plan:  She will follow up weeks weeks' time.  She will continue with her extension brace.      Cc:  Samuel Duane Kreis, MD    Scribed for Wilfred Randle MD by Briseida Randle RN.1:30 PM 07/09/2018

## 2018-08-16 DIAGNOSIS — S82.091D OTHER CLOSED FRACTURE OF RIGHT PATELLA WITH ROUTINE HEALING, SUBSEQUENT ENCOUNTER: Primary | ICD-10-CM

## 2018-08-20 ENCOUNTER — HOSPITAL ENCOUNTER (OUTPATIENT)
Dept: GENERAL RADIOLOGY | Facility: HOSPITAL | Age: 58
Discharge: HOME | End: 2018-08-20
Attending: ORTHOPAEDIC SURGERY | Admitting: ORTHOPAEDIC SURGERY

## 2018-08-20 ENCOUNTER — OFFICE VISIT (OUTPATIENT)
Dept: ORTHOPEDIC SURGERY | Facility: CLINIC | Age: 58
End: 2018-08-20

## 2018-08-20 VITALS — BODY MASS INDEX: 36.1 KG/M2 | HEIGHT: 67 IN | WEIGHT: 230 LBS

## 2018-08-20 DIAGNOSIS — S82.001D CLOSED NONDISPLACED FRACTURE OF RIGHT PATELLA WITH ROUTINE HEALING, UNSPECIFIED FRACTURE MORPHOLOGY, SUBSEQUENT ENCOUNTER: Primary | ICD-10-CM

## 2018-08-20 DIAGNOSIS — S82.091D OTHER CLOSED FRACTURE OF RIGHT PATELLA WITH ROUTINE HEALING, SUBSEQUENT ENCOUNTER: ICD-10-CM

## 2018-08-20 PROCEDURE — 99024 POSTOP FOLLOW-UP VISIT: CPT | Performed by: ORTHOPAEDIC SURGERY

## 2018-08-20 PROCEDURE — 73560 X-RAY EXAM OF KNEE 1 OR 2: CPT | Performed by: RADIOLOGY

## 2018-08-20 PROCEDURE — 73560 X-RAY EXAM OF KNEE 1 OR 2: CPT

## 2018-08-20 NOTE — PROGRESS NOTES
"Roseline Steiner   :1960    Date of encounter:2018        HPI:  Roseline Steiner is a 58 y.o. female who presents here today for follow-up of right knee patella fracture sustained on 2018.  She is now approximately 3 months post injury.  She states she is doing significantly better compared to last visit.  She states she still has some soreness and stiffness if she sits for too long.  She also states there is a popping sensation but this is not painful when it occurs.    PMH:   Patient Active Problem List   Diagnosis   • Migraine with status migrainosus   • Arthralgia of left hip   • Greater trochanteric bursitis   • Primary osteoarthritis of right knee   • Closed nondisplaced fracture of right patella       Exam:  General Appearance:    58 y.o. female  cooperative, in no acute distress.  Alert and oriented x 3,                   Vitals:    18 0814   Weight: 104 kg (230 lb)   Height: 170.2 cm (67.01\")          Body mass index is 36.01 kg/m².     examination of the right knee reveals no effusion.  She has no tenderness on palpation.  She demonstrates full extension and flexion.  There is no instability with varus or valgus stressing.  Her neurovascular status is intact.    Radiology:   AP and lateral views of the right knee were reviewed revealing the past fragment off the inferior pole of the patella with unchanged alignment compared to previous x-rays.    Assessment    ICD-10-CM ICD-9-CM   1. Closed nondisplaced fracture of right patella with routine healing, unspecified fracture morphology, subsequent encounter S82.001D V54.16       Plan:   a 58-year-old female with right knee injury that resulted in an avulsed fragment off the inferior pole of the patella that is nondisplaced.  It's been 3 months since her initial injury and she is improved significantly with little complaints of pain.  Her x-rays look good today with evidence of healing.  For now we will continue activities as tolerated and " she'll return back here in 3 months for repeat x-rays and follow-up.    Written by, Camila CABELLO, acting as a scribe for Dr. Randle

## 2018-11-15 DIAGNOSIS — S82.001D CLOSED NONDISPLACED FRACTURE OF RIGHT PATELLA WITH ROUTINE HEALING, UNSPECIFIED FRACTURE MORPHOLOGY, SUBSEQUENT ENCOUNTER: Primary | ICD-10-CM

## 2018-11-19 ENCOUNTER — HOSPITAL ENCOUNTER (OUTPATIENT)
Dept: GENERAL RADIOLOGY | Facility: HOSPITAL | Age: 58
Discharge: HOME | End: 2018-11-19
Attending: ORTHOPAEDIC SURGERY | Admitting: ORTHOPAEDIC SURGERY

## 2018-11-19 ENCOUNTER — OFFICE VISIT (OUTPATIENT)
Dept: ORTHOPEDIC SURGERY | Facility: CLINIC | Age: 58
End: 2018-11-19

## 2018-11-19 VITALS — WEIGHT: 230 LBS | HEIGHT: 67 IN | BODY MASS INDEX: 36.1 KG/M2

## 2018-11-19 DIAGNOSIS — M62.559 ATROPHY OF QUADRICEPS FEMORIS MUSCLE: ICD-10-CM

## 2018-11-19 DIAGNOSIS — S82.001D CLOSED NONDISPLACED FRACTURE OF RIGHT PATELLA WITH ROUTINE HEALING, UNSPECIFIED FRACTURE MORPHOLOGY, SUBSEQUENT ENCOUNTER: ICD-10-CM

## 2018-11-19 DIAGNOSIS — S82.001D CLOSED NONDISPLACED FRACTURE OF RIGHT PATELLA WITH ROUTINE HEALING, UNSPECIFIED FRACTURE MORPHOLOGY, SUBSEQUENT ENCOUNTER: Primary | ICD-10-CM

## 2018-11-19 DIAGNOSIS — Z96.651 STATUS POST TOTAL RIGHT KNEE REPLACEMENT: ICD-10-CM

## 2018-11-19 PROBLEM — M17.11 PRIMARY OSTEOARTHRITIS OF RIGHT KNEE: Status: RESOLVED | Noted: 2017-02-01 | Resolved: 2018-11-19

## 2018-11-19 PROCEDURE — 99213 OFFICE O/P EST LOW 20 MIN: CPT | Performed by: ORTHOPAEDIC SURGERY

## 2018-11-19 PROCEDURE — 73560 X-RAY EXAM OF KNEE 1 OR 2: CPT

## 2018-11-19 PROCEDURE — 73560 X-RAY EXAM OF KNEE 1 OR 2: CPT | Performed by: RADIOLOGY

## 2018-11-19 NOTE — PROGRESS NOTES
Follow-up Visit         Patient: Roseline Steiner  YOB: 1960  Date of Encounter: 11/19/2018      Chief  Complaint:   Chief Complaint   Patient presents with   • Right Knee - Pain, Edema, Follow-up         HPI:  Roseline Steiner, 58 y.o. female returns today in follow-up right total knee replacement with subsequent fracture inferior pole of patella sustained May 27, 2018.  She is now approximately 2 months post injury.  Her pain is improved and she can go up and down steps but she feels that her knee is unstable.  She denies actual giving way or locking.  She has not experienced significant knee swelling.    Medical History:  Patient Active Problem List   Diagnosis   • Migraine with status migrainosus   • Arthralgia of left hip   • Greater trochanteric bursitis   • Closed nondisplaced fracture of right patella   • Status post total right knee replacement     Past Medical History:   Diagnosis Date   • Arthritis    • Disease of thyroid gland    • Fibromyalgia    • GERD (gastroesophageal reflux disease)    • Insomnia    • Migraine    • Nephrolithiasis    • PONV (postoperative nausea and vomiting)    • Spinal headache        Social History:  Social History     Socioeconomic History   • Marital status:      Spouse name: Not on file   • Number of children: Not on file   • Years of education: Not on file   • Highest education level: Not on file   Social Needs   • Financial resource strain: Not on file   • Food insecurity - worry: Not on file   • Food insecurity - inability: Not on file   • Transportation needs - medical: Not on file   • Transportation needs - non-medical: Not on file   Occupational History   • Not on file   Tobacco Use   • Smoking status: Never Smoker   • Smokeless tobacco: Never Used   Substance and Sexual Activity   • Alcohol use: No   • Drug use: No   • Sexual activity: Defer   Other Topics Concern   • Not on file   Social History Narrative   • Not on file       Surgical  History:  Past Surgical History:   Procedure Laterality Date   • ABDOMINAL SURGERY     • CARPAL TUNNEL RELEASE Bilateral    • CHOLECYSTECTOMY     • CRANIAL NEUROSTIMULATOR INSERTION/REPLACEMENT  01/09/2017    for migraines   • EYE SURGERY Bilateral     Cataract   • GASTRIC BANDING     • HAND SURGERY     • HYSTERECTOMY     • JOINT REPLACEMENT  2012   • JOINT REPLACEMENT Right 02/14/2017    right total knee  [facility]  dr farah   • KNEE SURGERY         Radiology: Right knee x-rays AP lateral today reviewed show inferior pole patella slightly malrotated but consolidated.  The remainder of her total knee replacement is intact.      Examination:   Right knee evaluation reveals midline incision intact.  She demonstrates full extension full flexion no instability with varus valgus stressing.  Patella is with normal tracking.  She has marked quadriceps atrophy compared to the left.  Neurovascular grossly intact.      Assessment & Plan:   58 y.o. female status post right total knee arthroplasty with subsequent complication of fall sustaining inferior pole patella fracture which has united.  I think her major problem at this point is marked quadriceps atrophy and she is referred to physical therapy.  She will also do exercises at home.  We will reevaluate in 3 months time.         Diagnosis Plan   1. Closed nondisplaced fracture of right patella with routine healing, unspecified fracture morphology, subsequent encounter  Ambulatory Referral to Physical Therapy Evaluate and treat (History of right patella fracture, H/O right TKA); Strengthening (quadricep strengthening right)   2. Status post total right knee replacement  Ambulatory Referral to Physical Therapy Evaluate and treat (History of right patella fracture, H/O right TKA); Strengthening (quadricep strengthening right)   3. Atrophy of quadriceps femoris muscle  Ambulatory Referral to Physical Therapy Evaluate and treat (History of right patella fracture, H/O right TKA);  Strengthening (quadricep strengthening right)             Cc:  Kreis, Samuel Duane, MD      Scribed for Wilfred Randle MD by Briseida Randle RN.8:40 AM 11/19/2018

## 2018-11-19 NOTE — PROGRESS NOTES
Follow-up Visit         Patient: Roseline Steiner  YOB: 1960  Date of Encounter: 11/19/2018      Chief  Complaint:   Chief Complaint   Patient presents with   • Right Knee - Pain, Edema, Follow-up         HPI:  Roseline Steiner, 58 y.o. female     Medical History:  Patient Active Problem List   Diagnosis   • Migraine with status migrainosus   • Arthralgia of left hip   • Greater trochanteric bursitis   • Closed nondisplaced fracture of right patella   • Status post total right knee replacement     Past Medical History:   Diagnosis Date   • Arthritis    • Disease of thyroid gland    • Fibromyalgia    • GERD (gastroesophageal reflux disease)    • Insomnia    • Migraine    • Nephrolithiasis    • PONV (postoperative nausea and vomiting)    • Spinal headache        Social History:  Social History     Socioeconomic History   • Marital status:      Spouse name: Not on file   • Number of children: Not on file   • Years of education: Not on file   • Highest education level: Not on file   Social Needs   • Financial resource strain: Not on file   • Food insecurity - worry: Not on file   • Food insecurity - inability: Not on file   • Transportation needs - medical: Not on file   • Transportation needs - non-medical: Not on file   Occupational History   • Not on file   Tobacco Use   • Smoking status: Never Smoker   • Smokeless tobacco: Never Used   Substance and Sexual Activity   • Alcohol use: No   • Drug use: No   • Sexual activity: Defer   Other Topics Concern   • Not on file   Social History Narrative   • Not on file       Surgical History:  Past Surgical History:   Procedure Laterality Date   • ABDOMINAL SURGERY     • CARPAL TUNNEL RELEASE Bilateral    • CHOLECYSTECTOMY     • CRANIAL NEUROSTIMULATOR INSERTION/REPLACEMENT  01/09/2017    for migraines   • EYE SURGERY Bilateral     Cataract   • GASTRIC BANDING     • HAND SURGERY     • HYSTERECTOMY     • JOINT REPLACEMENT  2012   • JOINT REPLACEMENT Right  02/14/2017    right total knee  [facility]  dr farah   • KNEE SURGERY         Radiology:       Examination:       Assessment & Plan:   58 y.o. female          Diagnosis Plan   1. Closed nondisplaced fracture of right patella with routine healing, unspecified fracture morphology, subsequent encounter  Ambulatory Referral to Physical Therapy Evaluate and treat (History of right patella fracture, H/O right TKA); Strengthening (quadricep strengthening right)   2. Status post total right knee replacement  Ambulatory Referral to Physical Therapy Evaluate and treat (History of right patella fracture, H/O right TKA); Strengthening (quadricep strengthening right)   3. Atrophy of quadriceps femoris muscle  Ambulatory Referral to Physical Therapy Evaluate and treat (History of right patella fracture, H/O right TKA); Strengthening (quadricep strengthening right)             Cc:  Kreis, Samuel Duane, MD      Scribed for Wilfred Farah MD by Briseida Farah RN.8:39 AM 11/19/2018

## 2019-09-15 ENCOUNTER — HOSPITAL ENCOUNTER (EMERGENCY)
Facility: HOSPITAL | Age: 59
Discharge: HOME | End: 2019-09-15
Attending: EMERGENCY MEDICINE | Admitting: EMERGENCY MEDICINE

## 2019-09-15 ENCOUNTER — APPOINTMENT (OUTPATIENT)
Dept: CT IMAGING | Facility: HOSPITAL | Age: 59
End: 2019-09-15

## 2019-09-15 VITALS
BODY MASS INDEX: 37.67 KG/M2 | DIASTOLIC BLOOD PRESSURE: 92 MMHG | TEMPERATURE: 98.1 F | HEART RATE: 86 BPM | WEIGHT: 240 LBS | SYSTOLIC BLOOD PRESSURE: 130 MMHG | RESPIRATION RATE: 18 BRPM | HEIGHT: 67 IN | OXYGEN SATURATION: 97 %

## 2019-09-15 DIAGNOSIS — R10.9 RIGHT FLANK PAIN: Primary | ICD-10-CM

## 2019-09-15 LAB
ALBUMIN SERPL-MCNC: 4.39 G/DL (ref 3.5–5.2)
ALBUMIN/GLOB SERPL: 1.5 G/DL
ALP SERPL-CCNC: 74 U/L (ref 39–117)
ALT SERPL W P-5'-P-CCNC: 22 U/L (ref 1–33)
ANION GAP SERPL CALCULATED.3IONS-SCNC: 14.5 MMOL/L (ref 5–15)
AST SERPL-CCNC: 16 U/L (ref 1–32)
BACTERIA UR QL AUTO: ABNORMAL /HPF
BASOPHILS # BLD AUTO: 0.04 10*3/MM3 (ref 0–0.2)
BASOPHILS NFR BLD AUTO: 0.5 % (ref 0–1.5)
BILIRUB SERPL-MCNC: 0.3 MG/DL (ref 0.2–1.2)
BILIRUB UR QL STRIP: NEGATIVE
BUN BLD-MCNC: 8 MG/DL (ref 8–23)
BUN/CREAT SERPL: 8.6 (ref 7–25)
CALCIUM SPEC-SCNC: 9.7 MG/DL (ref 8.6–10.5)
CHLORIDE SERPL-SCNC: 101 MMOL/L (ref 98–107)
CLARITY UR: CLEAR
CO2 SERPL-SCNC: 24.5 MMOL/L (ref 22–29)
COLOR UR: YELLOW
CREAT BLD-MCNC: 0.93 MG/DL (ref 0.57–1)
DEPRECATED RDW RBC AUTO: 51.4 FL (ref 37–54)
EOSINOPHIL # BLD AUTO: 0.18 10*3/MM3 (ref 0–0.4)
EOSINOPHIL NFR BLD AUTO: 2.4 % (ref 0.3–6.2)
ERYTHROCYTE [DISTWIDTH] IN BLOOD BY AUTOMATED COUNT: 15.4 % (ref 12.3–15.4)
GFR SERPL CREATININE-BSD FRML MDRD: 61 ML/MIN/1.73
GLOBULIN UR ELPH-MCNC: 3 GM/DL
GLUCOSE BLD-MCNC: 136 MG/DL (ref 65–99)
GLUCOSE UR STRIP-MCNC: NEGATIVE MG/DL
HCT VFR BLD AUTO: 43.3 % (ref 34–46.6)
HGB BLD-MCNC: 13.7 G/DL (ref 12–15.9)
HGB UR QL STRIP.AUTO: NEGATIVE
HYALINE CASTS UR QL AUTO: ABNORMAL /LPF
IMM GRANULOCYTES # BLD AUTO: 0.03 10*3/MM3 (ref 0–0.05)
IMM GRANULOCYTES NFR BLD AUTO: 0.4 % (ref 0–0.5)
KETONES UR QL STRIP: NEGATIVE
LEUKOCYTE ESTERASE UR QL STRIP.AUTO: ABNORMAL
LIPASE SERPL-CCNC: 9 U/L (ref 13–60)
LYMPHOCYTES # BLD AUTO: 1.94 10*3/MM3 (ref 0.7–3.1)
LYMPHOCYTES NFR BLD AUTO: 26.2 % (ref 19.6–45.3)
MAGNESIUM SERPL-MCNC: 2 MG/DL (ref 1.6–2.4)
MCH RBC QN AUTO: 30.5 PG (ref 26.6–33)
MCHC RBC AUTO-ENTMCNC: 31.6 G/DL (ref 31.5–35.7)
MCV RBC AUTO: 96.4 FL (ref 79–97)
MONOCYTES # BLD AUTO: 0.61 10*3/MM3 (ref 0.1–0.9)
MONOCYTES NFR BLD AUTO: 8.2 % (ref 5–12)
NEUTROPHILS # BLD AUTO: 4.6 10*3/MM3 (ref 1.7–7)
NEUTROPHILS NFR BLD AUTO: 62.3 % (ref 42.7–76)
NITRITE UR QL STRIP: NEGATIVE
PH UR STRIP.AUTO: 7 [PH] (ref 5–8)
PLATELET # BLD AUTO: 306 10*3/MM3 (ref 140–450)
PMV BLD AUTO: 9.8 FL (ref 6–12)
POTASSIUM BLD-SCNC: 4.1 MMOL/L (ref 3.5–5.2)
PROT SERPL-MCNC: 7.4 G/DL (ref 6–8.5)
PROT UR QL STRIP: NEGATIVE
RBC # BLD AUTO: 4.49 10*6/MM3 (ref 3.77–5.28)
RBC # UR: ABNORMAL /HPF
REF LAB TEST METHOD: ABNORMAL
SODIUM BLD-SCNC: 140 MMOL/L (ref 136–145)
SP GR UR STRIP: 1.01 (ref 1–1.03)
SQUAMOUS #/AREA URNS HPF: ABNORMAL /HPF
UROBILINOGEN UR QL STRIP: ABNORMAL
WBC NRBC COR # BLD: 7.4 10*3/MM3 (ref 3.4–10.8)
WBC UR QL AUTO: ABNORMAL /HPF

## 2019-09-15 PROCEDURE — 96374 THER/PROPH/DIAG INJ IV PUSH: CPT

## 2019-09-15 PROCEDURE — 74176 CT ABD & PELVIS W/O CONTRAST: CPT | Performed by: RADIOLOGY

## 2019-09-15 PROCEDURE — 83690 ASSAY OF LIPASE: CPT | Performed by: PHYSICIAN ASSISTANT

## 2019-09-15 PROCEDURE — 85025 COMPLETE CBC W/AUTO DIFF WBC: CPT | Performed by: PHYSICIAN ASSISTANT

## 2019-09-15 PROCEDURE — 81001 URINALYSIS AUTO W/SCOPE: CPT | Performed by: PHYSICIAN ASSISTANT

## 2019-09-15 PROCEDURE — 87086 URINE CULTURE/COLONY COUNT: CPT | Performed by: PHYSICIAN ASSISTANT

## 2019-09-15 PROCEDURE — 96375 TX/PRO/DX INJ NEW DRUG ADDON: CPT

## 2019-09-15 PROCEDURE — 25010000002 ONDANSETRON PER 1 MG: Performed by: PHYSICIAN ASSISTANT

## 2019-09-15 PROCEDURE — 25010000002 KETOROLAC TROMETHAMINE PER 15 MG: Performed by: PHYSICIAN ASSISTANT

## 2019-09-15 PROCEDURE — 80053 COMPREHEN METABOLIC PANEL: CPT | Performed by: PHYSICIAN ASSISTANT

## 2019-09-15 PROCEDURE — 25010000002 HYDROMORPHONE PER 4 MG: Performed by: EMERGENCY MEDICINE

## 2019-09-15 PROCEDURE — 99283 EMERGENCY DEPT VISIT LOW MDM: CPT

## 2019-09-15 PROCEDURE — 74176 CT ABD & PELVIS W/O CONTRAST: CPT

## 2019-09-15 PROCEDURE — 83735 ASSAY OF MAGNESIUM: CPT | Performed by: PHYSICIAN ASSISTANT

## 2019-09-15 RX ORDER — NAPROXEN 500 MG/1
500 TABLET ORAL 2 TIMES DAILY PRN
Qty: 20 TABLET | Refills: 0 | Status: SHIPPED | OUTPATIENT
Start: 2019-09-15

## 2019-09-15 RX ORDER — CEFDINIR 300 MG/1
300 CAPSULE ORAL 2 TIMES DAILY
Qty: 14 CAPSULE | Refills: 0 | Status: SHIPPED | OUTPATIENT
Start: 2019-09-15 | End: 2019-09-22

## 2019-09-15 RX ORDER — SODIUM CHLORIDE 0.9 % (FLUSH) 0.9 %
10 SYRINGE (ML) INJECTION AS NEEDED
Status: DISCONTINUED | OUTPATIENT
Start: 2019-09-15 | End: 2019-09-15 | Stop reason: HOSPADM

## 2019-09-15 RX ORDER — HYDROMORPHONE HYDROCHLORIDE 1 MG/ML
0.5 INJECTION, SOLUTION INTRAMUSCULAR; INTRAVENOUS; SUBCUTANEOUS ONCE
Status: COMPLETED | OUTPATIENT
Start: 2019-09-15 | End: 2019-09-15

## 2019-09-15 RX ORDER — KETOROLAC TROMETHAMINE 30 MG/ML
30 INJECTION, SOLUTION INTRAMUSCULAR; INTRAVENOUS ONCE
Status: COMPLETED | OUTPATIENT
Start: 2019-09-15 | End: 2019-09-15

## 2019-09-15 RX ORDER — ONDANSETRON 4 MG/1
4 TABLET, ORALLY DISINTEGRATING ORAL EVERY 6 HOURS PRN
Qty: 10 TABLET | Refills: 0 | Status: SHIPPED | OUTPATIENT
Start: 2019-09-15

## 2019-09-15 RX ORDER — ONDANSETRON 2 MG/ML
4 INJECTION INTRAMUSCULAR; INTRAVENOUS ONCE
Status: COMPLETED | OUTPATIENT
Start: 2019-09-15 | End: 2019-09-15

## 2019-09-15 RX ADMIN — HYDROMORPHONE HYDROCHLORIDE 0.5 MG: 1 INJECTION, SOLUTION INTRAMUSCULAR; INTRAVENOUS; SUBCUTANEOUS at 13:57

## 2019-09-15 RX ADMIN — ONDANSETRON 4 MG: 2 INJECTION, SOLUTION INTRAMUSCULAR; INTRAVENOUS at 13:57

## 2019-09-15 RX ADMIN — KETOROLAC TROMETHAMINE 30 MG: 30 INJECTION, SOLUTION INTRAMUSCULAR at 15:30

## 2019-09-15 RX ADMIN — SODIUM CHLORIDE 1000 ML: 9 INJECTION, SOLUTION INTRAVENOUS at 14:01

## 2019-09-15 NOTE — ED PROVIDER NOTES
Subjective   59-year-old female who presents to the ED today with right flank pain.  She states this started around noon yesterday.  She states it is a constant tight and burning pain with an intermittent sharp pain.  She states she has a burning pain that radiates to the right lower quadrant.  She states she has a past history of kidney stones and this feels similar.  She denies any fever.  She denies any urinary symptoms.  She states she has had nausea with some vomiting.  She has tried ibuprofen with no relief.        History provided by:  Patient  Flank Pain   Pain location:  R flank  Pain quality: burning    Pain radiates to:  RLQ  Pain severity:  Moderate  Onset quality:  Gradual  Duration:  1 day  Timing:  Constant  Progression:  Waxing and waning  Chronicity:  Recurrent  Context: not recent travel, not sick contacts, not suspicious food intake and not trauma    Relieved by:  Nothing  Worsened by:  Nothing  Ineffective treatments:  NSAIDs  Associated symptoms: nausea and vomiting    Associated symptoms: no anorexia, no chest pain, no chills, no constipation, no cough, no diarrhea, no dysuria, no fatigue, no fever, no hematemesis, no hematochezia, no hematuria and no melena        Review of Systems   Constitutional: Negative for chills, fatigue and fever.   HENT: Negative.    Eyes: Negative.    Respiratory: Negative for cough.    Cardiovascular: Negative for chest pain.   Gastrointestinal: Positive for nausea and vomiting. Negative for anorexia, constipation, diarrhea, hematemesis, hematochezia and melena.   Genitourinary: Positive for flank pain. Negative for dysuria and hematuria.   Musculoskeletal: Positive for back pain.   Skin: Negative.    Neurological: Negative.    Psychiatric/Behavioral: Negative.    All other systems reviewed and are negative.      Past Medical History:   Diagnosis Date   • Arthritis    • Disease of thyroid gland    • Fibromyalgia    • GERD (gastroesophageal reflux disease)    •  Insomnia    • Migraine    • Nephrolithiasis    • PONV (postoperative nausea and vomiting)    • Spinal headache        Allergies   Allergen Reactions   • Codeine Hives and Itching       Past Surgical History:   Procedure Laterality Date   • ABDOMINAL SURGERY     • CARPAL TUNNEL RELEASE Bilateral    • CHOLECYSTECTOMY     • CRANIAL NEUROSTIMULATOR INSERTION/REPLACEMENT  01/09/2017    for migraines   • EYE SURGERY Bilateral     Cataract   • GASTRIC BANDING     • HAND SURGERY     • HYSTERECTOMY     • JOINT REPLACEMENT  2012   • JOINT REPLACEMENT Right 02/14/2017    right total knee  [facility]  dr farah   • KNEE SURGERY     • OK TOTAL KNEE ARTHROPLASTY Right 2/14/2017    Procedure: TOTAL KNEE ARTHROPLASTY;  Surgeon: Wilfred Farah MD;  Location: [facility];  Service: Orthopedics       Family History   Problem Relation Age of Onset   • Cancer Father    • Hypertension Father    • Hyperlipidemia Mother        Social History     Socioeconomic History   • Marital status:      Spouse name: Not on file   • Number of children: Not on file   • Years of education: Not on file   • Highest education level: Not on file   Tobacco Use   • Smoking status: Never Smoker   • Smokeless tobacco: Never Used   Substance and Sexual Activity   • Alcohol use: No   • Drug use: No   • Sexual activity: Defer           Objective   Physical Exam   Constitutional: She is oriented to person, place, and time. She appears well-developed and well-nourished. She appears distressed (appears to be in pain).   HENT:   Head: Normocephalic and atraumatic.   Right Ear: External ear normal.   Left Ear: External ear normal.   Nose: Nose normal.   Mouth/Throat: Oropharynx is clear and moist.   Eyes: Conjunctivae and EOM are normal. Pupils are equal, round, and reactive to light.   Neck: Normal range of motion. Neck supple.   Cardiovascular: Normal rate, regular rhythm, normal heart sounds and intact distal pulses.   Pulmonary/Chest: Effort normal and  breath sounds normal.   Abdominal: Soft. Bowel sounds are normal. There is no tenderness. There is no rebound and no guarding.   Right CVA tenderness   Musculoskeletal: Normal range of motion.   Neurological: She is alert and oriented to person, place, and time.   Skin: Skin is warm and dry. Capillary refill takes less than 2 seconds.   Psychiatric: She has a normal mood and affect. Her behavior is normal. Judgment and thought content normal.   Nursing note and vitals reviewed.      Procedures           ED Course  ED Course as of Sep 15 1643   Sun Sep 15, 2019   1642 No ureteral stones noted on CT scan, no hydronephrosis.  WBC seen on UA.  Will discharge home on antibiotics, nausea medication and NSAIDs.  She is to follow up outpatient.  [AH]      ED Course User Index  [] Camryn Sahu PA                  [facility]  Number of Diagnoses or Management Options  Right flank pain:      Amount and/or Complexity of Data Reviewed  Clinical lab tests: reviewed  Tests in the radiology section of CPT®: reviewed    Patient Progress  Patient progress: improved      Final diagnoses:   Right flank pain              Camryn Sahu PA  09/15/19 [id number]

## 2019-09-16 LAB — BACTERIA SPEC AEROBE CULT: NORMAL

## 2019-10-14 ENCOUNTER — LAB (OUTPATIENT)
Dept: LAB | Facility: HOSPITAL | Age: 59
End: 2019-10-14

## 2019-10-14 ENCOUNTER — OFFICE VISIT (OUTPATIENT)
Dept: ORTHOPEDIC SURGERY | Facility: CLINIC | Age: 59
End: 2019-10-14

## 2019-10-14 ENCOUNTER — HOSPITAL ENCOUNTER (OUTPATIENT)
Dept: GENERAL RADIOLOGY | Facility: HOSPITAL | Age: 59
Discharge: HOME | End: 2019-10-14
Admitting: ORTHOPAEDIC SURGERY

## 2019-10-14 VITALS — HEIGHT: 67 IN | WEIGHT: 240 LBS | BODY MASS INDEX: 37.67 KG/M2

## 2019-10-14 DIAGNOSIS — S82.001D CLOSED NONDISPLACED FRACTURE OF RIGHT PATELLA WITH ROUTINE HEALING, UNSPECIFIED FRACTURE MORPHOLOGY, SUBSEQUENT ENCOUNTER: ICD-10-CM

## 2019-10-14 DIAGNOSIS — M70.50 PES ANSERINE BURSITIS: Primary | ICD-10-CM

## 2019-10-14 DIAGNOSIS — M70.50 PES ANSERINE BURSITIS: ICD-10-CM

## 2019-10-14 LAB
BASOPHILS # BLD AUTO: 0.07 10*3/MM3 (ref 0–0.2)
BASOPHILS NFR BLD AUTO: 0.9 % (ref 0–1.5)
CRP SERPL-MCNC: 1.21 MG/DL (ref 0–0.5)
DEPRECATED RDW RBC AUTO: 47.2 FL (ref 37–54)
EOSINOPHIL # BLD AUTO: 0.13 10*3/MM3 (ref 0–0.4)
EOSINOPHIL NFR BLD AUTO: 1.7 % (ref 0.3–6.2)
ERYTHROCYTE [DISTWIDTH] IN BLOOD BY AUTOMATED COUNT: 13.9 % (ref 12.3–15.4)
ERYTHROCYTE [SEDIMENTATION RATE] IN BLOOD: 16 MM/HR (ref 0–30)
HCT VFR BLD AUTO: 39.2 % (ref 34–46.6)
HGB BLD-MCNC: 13.1 G/DL (ref 12–15.9)
IMM GRANULOCYTES # BLD AUTO: 0.05 10*3/MM3 (ref 0–0.05)
IMM GRANULOCYTES NFR BLD AUTO: 0.7 % (ref 0–0.5)
LYMPHOCYTES # BLD AUTO: 2 10*3/MM3 (ref 0.7–3.1)
LYMPHOCYTES NFR BLD AUTO: 26.7 % (ref 19.6–45.3)
MCH RBC QN AUTO: 31 PG (ref 26.6–33)
MCHC RBC AUTO-ENTMCNC: 33.4 G/DL (ref 31.5–35.7)
MCV RBC AUTO: 92.7 FL (ref 79–97)
MONOCYTES # BLD AUTO: 0.56 10*3/MM3 (ref 0.1–0.9)
MONOCYTES NFR BLD AUTO: 7.5 % (ref 5–12)
NEUTROPHILS # BLD AUTO: 4.69 10*3/MM3 (ref 1.7–7)
NEUTROPHILS NFR BLD AUTO: 62.5 % (ref 42.7–76)
NRBC BLD AUTO-RTO: 0.1 /100 WBC (ref 0–0.2)
PLATELET # BLD AUTO: 310 10*3/MM3 (ref 140–450)
PMV BLD AUTO: 10 FL (ref 6–12)
RBC # BLD AUTO: 4.23 10*6/MM3 (ref 3.77–5.28)
WBC NRBC COR # BLD: 7.5 10*3/MM3 (ref 3.4–10.8)

## 2019-10-14 PROCEDURE — 73562 X-RAY EXAM OF KNEE 3: CPT | Performed by: RADIOLOGY

## 2019-10-14 PROCEDURE — 36415 COLL VENOUS BLD VENIPUNCTURE: CPT | Performed by: ORTHOPAEDIC SURGERY

## 2019-10-14 PROCEDURE — 73562 X-RAY EXAM OF KNEE 3: CPT

## 2019-10-14 PROCEDURE — 86140 C-REACTIVE PROTEIN: CPT | Performed by: ORTHOPAEDIC SURGERY

## 2019-10-14 PROCEDURE — 85652 RBC SED RATE AUTOMATED: CPT | Performed by: ORTHOPAEDIC SURGERY

## 2019-10-14 PROCEDURE — 85025 COMPLETE CBC W/AUTO DIFF WBC: CPT

## 2019-10-14 PROCEDURE — 20610 DRAIN/INJ JOINT/BURSA W/O US: CPT | Performed by: ORTHOPAEDIC SURGERY

## 2019-10-14 PROCEDURE — 99213 OFFICE O/P EST LOW 20 MIN: CPT | Performed by: ORTHOPAEDIC SURGERY

## 2019-10-14 RX ORDER — PROMETHAZINE HYDROCHLORIDE 25 MG/1
25 TABLET ORAL EVERY 8 HOURS PRN
Refills: 2 | COMMUNITY
Start: 2019-10-03

## 2019-10-14 RX ORDER — AMOXICILLIN AND CLAVULANATE POTASSIUM 500; 125 MG/1; MG/1
TABLET, FILM COATED ORAL
Refills: 0 | COMMUNITY
Start: 2019-08-15

## 2019-10-14 RX ORDER — CHOLECALCIFEROL (VITAMIN D3) 1250 MCG
50000 CAPSULE ORAL WEEKLY
Refills: 5 | COMMUNITY
Start: 2019-10-01

## 2019-10-14 RX ORDER — FREMANEZUMAB-VFRM 225 MG/1.5ML
INJECTION SUBCUTANEOUS
Refills: 5 | COMMUNITY
Start: 2019-10-03 | End: 2022-08-31

## 2019-10-14 RX ORDER — OXYCODONE HYDROCHLORIDE AND ACETAMINOPHEN 5; 325 MG/1; MG/1
1 TABLET ORAL SEE ADMIN INSTRUCTIONS
Refills: 0 | COMMUNITY
Start: 2019-08-22

## 2019-10-14 RX ORDER — VALACYCLOVIR HYDROCHLORIDE 1 G/1
TABLET, FILM COATED ORAL
Refills: 0 | COMMUNITY
Start: 2019-08-20

## 2019-10-14 RX ORDER — BUPROPION HYDROCHLORIDE 150 MG/1
TABLET, EXTENDED RELEASE ORAL
Refills: 0 | COMMUNITY
Start: 2019-10-01 | End: 2020-01-21

## 2019-10-14 RX ORDER — NYSTATIN 100000 U/G
OINTMENT TOPICAL
Refills: 0 | COMMUNITY
Start: 2019-08-22

## 2019-10-14 RX ADMIN — LIDOCAINE HYDROCHLORIDE 5 ML: 20 INJECTION, SOLUTION INFILTRATION; PERINEURAL at 09:38

## 2019-10-14 RX ADMIN — METHYLPREDNISOLONE ACETATE 40 MG: 40 INJECTION, SUSPENSION INTRA-ARTICULAR; INTRALESIONAL; INTRAMUSCULAR; SOFT TISSUE at 09:38

## 2019-10-14 NOTE — PROGRESS NOTES
Follow-up Visit         Patient: Roseline Steiner  YOB: 1960  Date of Encounter: 10/14/2019      Chief  Complaint:   Chief Complaint   Patient presents with   • Right Knee - Follow-up, Post-op     Right total knee arthroplasty 05/27/18         HPI:  Roseline Steiner, 59 y.o. female presents today for evaluation of right knee pain.  He has been symptomatic medial aspect of her right knee over the past 4 weeks.  She reports no trauma she denies significant swelling of right knee she has had no instability no giving way or locking.  She underwent right total knee arthroplasty February 4 of 2017 she is done relatively well with her knee other than an injury sustained last year which involved fracture inferior pole the patella.  She eventually united her fracture and had been doing well until her symptoms 1 month ago.  She also reports that she has had 3 oral surgeries and is scheduled for a fourth.  She reports that she has been on steroids and antibiotics.  She does not recall injury to her right knee.  She has pain going up and down steps pain when pushing the gas pedal of her car.    Medical History:  Patient Active Problem List   Diagnosis   • Migraine with status migrainosus   • Arthralgia of left hip   • Greater trochanteric bursitis   • Closed nondisplaced fracture of right patella   • Status post total right knee replacement     Past Medical History:   Diagnosis Date   • Arthritis    • Disease of thyroid gland    • Fibromyalgia    • GERD (gastroesophageal reflux disease)    • Insomnia    • Migraine    • Nephrolithiasis    • PONV (postoperative nausea and vomiting)    • Spinal headache        Social History:  Social History     Socioeconomic History   • Marital status:      Spouse name: Not on file   • Number of children: Not on file   • Years of education: Not on file   • Highest education level: Not on file   Tobacco Use   • Smoking status: Never Smoker   • Smokeless tobacco: Never Used    Substance and Sexual Activity   • Alcohol use: No   • Drug use: No   • Sexual activity: Defer       Surgical History:  Past Surgical History:   Procedure Laterality Date   • ABDOMINAL SURGERY     • CARPAL TUNNEL RELEASE Bilateral    • CHOLECYSTECTOMY     • CRANIAL NEUROSTIMULATOR INSERTION/REPLACEMENT  01/09/2017    for migraines   • EYE SURGERY Bilateral     Cataract   • GASTRIC BANDING     • HAND SURGERY     • HYSTERECTOMY     • JOINT REPLACEMENT  2012   • JOINT REPLACEMENT Right 02/14/2017    right total knee  [facility]  dr farah   • KNEE SURGERY     • WI TOTAL KNEE ARTHROPLASTY Right 2/14/2017    Procedure: TOTAL KNEE ARTHROPLASTY;  Surgeon: Wilfred Farah MD;  Location: [facility];  Service: Orthopedics       Radiology:   Right knee radiographs show total knee arthroplasty good position and alignment without evidence of loosening.  The patella fracture inferiorly appears to have united.      Examination:   Right knee evaluation reveals no effusion no localized swelling no erythema no hyperthermia.  She demonstrates full extension full flexion of her knee, she has exquisite tenderness medial aspect of her knee in the region of the Pes anserine bursa.    Assessment & Plan:   59 y.o. female presents with 1 month history of right knee pain and clinical findings consistent with peds anserine bursitis.  Radiographs are not concerning however we will obtain CBC CRP and ESR to establish baseline.  She is treated today with Depo-Medrol 40 mg with lidocaine block peds anserine bursa right knee.  She will follow-up as needed we will notify her with any significant concern regarding her blood work-up today.         Diagnosis Plan   1. Closed nondisplaced fracture of right patella with routine healing, unspecified fracture morphology, subsequent encounter  XR Knee 3 View Right     Pes anserine bursa knee right  Date/Time: 10/14/2019 9:38 AM  Performed by: Wilfred Farah MD  Authorized by: Wilfred Farah  MD Edgard   Consent: Verbal consent obtained.  Risks and benefits: risks, benefits and alternatives were discussed  Consent given by: patient  Patient understanding: patient states understanding of the procedure being performed  Site marked: the operative site was marked  Imaging studies: imaging studies available  Patient identity confirmed: verbally with patient  Preparation: Patient was prepped and draped in the usual sterile fashion.  Local anesthesia used: yes  Anesthesia: local infiltration    Anesthesia:  Local anesthesia used: yes  Anesthetic total: 5 mL    Sedation:  Patient sedated: no    Medications administered: 5 mL lidocaine 2%; 40 mg methylPREDNISolone acetate 40 MG/ML                Cc:  Kreis, Samuel Duane, MD              This document has been electronically signed by Wilfred Randle MD   October 14, 2019 9:18 AM

## 2019-10-16 RX ORDER — LIDOCAINE HYDROCHLORIDE 20 MG/ML
5 INJECTION, SOLUTION INFILTRATION; PERINEURAL
Status: COMPLETED | OUTPATIENT
Start: 2019-10-14 | End: 2019-10-14

## 2019-10-16 RX ORDER — METHYLPREDNISOLONE ACETATE 40 MG/ML
40 INJECTION, SUSPENSION INTRA-ARTICULAR; INTRALESIONAL; INTRAMUSCULAR; SOFT TISSUE
Status: COMPLETED | OUTPATIENT
Start: 2019-10-14 | End: 2019-10-14

## 2020-01-21 ENCOUNTER — OFFICE VISIT (OUTPATIENT)
Dept: PSYCHIATRY | Facility: CLINIC | Age: 60
End: 2020-01-21

## 2020-01-21 VITALS
WEIGHT: 254 LBS | SYSTOLIC BLOOD PRESSURE: 131 MMHG | HEART RATE: 110 BPM | DIASTOLIC BLOOD PRESSURE: 85 MMHG | HEIGHT: 67 IN | BODY MASS INDEX: 39.87 KG/M2

## 2020-01-21 DIAGNOSIS — Z79.899 MEDICATION MANAGEMENT: ICD-10-CM

## 2020-01-21 DIAGNOSIS — F33.1 MAJOR DEPRESSIVE DISORDER, RECURRENT EPISODE, MODERATE (HCC): Primary | ICD-10-CM

## 2020-01-21 LAB
AMPHETAMINE CUT-OFF: 1000
BENZODIAZIPINE CUT-OFF: 300
BUPRENORPHINE CUT-OFF: 10
COCAINE CUT-OFF: 300
EXTERNAL AMPHETAMINE SCREEN URINE: NEGATIVE
EXTERNAL BENZODIAZEPINE SCREEN URINE: NEGATIVE
EXTERNAL BUPRENORPHINE SCREEN URINE: NEGATIVE
EXTERNAL COCAINE SCREEN URINE: NEGATIVE
EXTERNAL MDMA: NEGATIVE
EXTERNAL METHADONE SCREEN URINE: NEGATIVE
EXTERNAL METHAMPHETAMINE SCREEN URINE: NEGATIVE
EXTERNAL OPIATES SCREEN URINE: NEGATIVE
EXTERNAL OXYCODONE SCREEN URINE: NEGATIVE
EXTERNAL THC SCREEN URINE: NEGATIVE
MDMA CUT-OFF: 500
METHADONE CUT-OFF: 300
METHAMPHETAMINE CUT-OFF: 1000
OPIATES CUT-OFF: 300
OXYCODONE CUT-OFF: 100
THC CUT-OFF: 50

## 2020-01-21 PROCEDURE — 90792 PSYCH DIAG EVAL W/MED SRVCS: CPT | Performed by: NURSE PRACTITIONER

## 2020-01-21 RX ORDER — BUPROPION HYDROCHLORIDE 150 MG/1
150 TABLET ORAL EVERY MORNING
Qty: 30 TABLET | Refills: 0 | Status: SHIPPED | OUTPATIENT
Start: 2020-01-21

## 2020-01-21 NOTE — PROGRESS NOTES
"Subjective   Roseline Steiner is a 59 y.o. female who is here today for initial appointment to evaluate for medication options.     Chief Complaint:  Depression     HPI:  History of Present Illness   Roseline is a 59-year-old female who presents to the clinic today for initial evaluation for medication options.  She reports she had been on Prozac for years for fibromyalgia and it helps. She reports having a \"I don't care\" attitude about everything. She reports doesn't want to leave her house and will stay in her pajamas. She reports no interest or enjoyment in things. She reports unless it's something she has to do like pick her grandchild up from school every evening then she avoids doing things. She reports struggling with depression since July. She reports she has had to go through several oral surgeries and has another one coming up and feels this has worsened her depression. Depression began after [facility] in 2015. She worked for [employer]. She reports she also had two sons going through addiction but states they are now sober and doing well. She takes nortriptyline for migraines which has helped. She tried Wellbutrin  mg daily and it did not help with depression. She had thyroid checked three months ago and level was good. She takes Synthroid daily for hypothyroidism. She reports she was taking Vit D 50,000 units weekly up until her last dose last week. She reports has an appointment to have labs done. She denies history of seizures. She rates depression 7/10 with 10 being the worst. She denies SI/HI/AH/VH.  Sleep and appetite are good.    Past Psych History: PCP    Previous Psych Meds: Prozac, Wellbutrin, Nortriptyline, Remeron     Substance Abuse: Denies     Social History: Lives with  of 43 years. She has two adults sons and two grandchildren ages 9 and 4.  She is retired from the [employer].      Family Psychiatric History:  family history includes Cancer in her " father; Hyperlipidemia in her mother; Hypertension in her father.    Medical/Surgical History:  Past Medical History:   Diagnosis Date   • Arthritis    • Disease of thyroid gland    • Fibromyalgia    • Fibromyalgia    • GERD (gastroesophageal reflux disease)    • Insomnia    • Migraine    • Migraines    • Nephrolithiasis    • PONV (postoperative nausea and vomiting)    • Spinal headache      Past Surgical History:   Procedure Laterality Date   • ABDOMINAL SURGERY     • CARPAL TUNNEL RELEASE Bilateral    • CHOLECYSTECTOMY     • CRANIAL NEUROSTIMULATOR INSERTION/REPLACEMENT  01/09/2017    for migraines   • EYE SURGERY Bilateral     Cataract   • GASTRIC BANDING     • HAND SURGERY     • HYSTERECTOMY     • JOINT REPLACEMENT  2012   • JOINT REPLACEMENT Right 02/14/2017    right total knee  [facility]  dr farah   • KNEE SURGERY     • AR TOTAL KNEE ARTHROPLASTY Right 2/14/2017    Procedure: TOTAL KNEE ARTHROPLASTY;  Surgeon: Wilfred Farah MD;  Location: [facility];  Service: Orthopedics       Allergies   Allergen Reactions   • Codeine Hives and Itching           Current Medications:   Current Outpatient Medications   Medication Sig Dispense Refill   • AJOVY 225 MG/1.5ML solution prefilled syringe INJECT 1.5 ML SUBCUTANEOUSLY ONCE EVERY MONTH  5   • baclofen (LIORESAL) 10 MG tablet TAKE ONE TABLET BY MOUTH EVERY MORNING AND TAKE TWO TABLETS EVERY EVENING  2   • dihydroergotamine (MIGRANAL) 4 MG/ML nasal spray 1 spray into each nostril As Needed for migraine.  2   • FLUoxetine (PROzac) 20 MG capsule Take 20 mg by mouth 3 (three) times a day.     • gabapentin (NEURONTIN) 300 MG capsule Take 300 mg by mouth 2 (two) times a day.     • levothyroxine (SYNTHROID, LEVOTHROID) 100 MCG tablet Take 100 mcg by mouth daily.     • nortriptyline (PAMELOR) 25 MG capsule TAKE 1 OR 2 CAPSULES BY MOUTH EVERY NIGHT AT BEDTIME AS NEEDED  2   • OnabotulinumtoxinA (BOTOX) 200 units reconstituted solution Inject  into the appropriate muscle as  directed by prescriber 1 (One) Time.     • promethazine (PHENERGAN) 25 MG tablet Take 25 mg by mouth Every 8 (Eight) Hours As Needed.  2   • verapamil (VERELAN PM) 200 MG capsule sustained-release 24 hr capsule TAKE ONE CAPSULE BY MOUTH DAILY BEFORE EATING  5   • amoxicillin-clavulanate (AUGMENTIN) 500-125 MG per tablet TAKE ONE TABLET BY MOUTH TWO TIMES A DAY FOR 10 DAYS FINISH ALL OF THIS MEDICATION  0   • buPROPion XL (WELLBUTRIN XL) 150 MG 24 hr tablet Take 1 tablet by mouth Every Morning. 30 tablet 0   • butorphanol (STADOL) 10 MG/ML nasal spray 1 spray into each nostril As Needed for mild pain (1-3) or moderate pain (4-6) (migraine, take if migranol doesn't work). May repeat 1 spray in 60 min if first spray doesn't achieve good migraine control.  2   • Cholecalciferol (VITAMIN D3) 84391 units capsule Take 50,000 Units by mouth 1 (One) Time Per Week.  5   • naproxen (NAPROSYN) 500 MG tablet Take 1 tablet by mouth 2 (Two) Times a Day As Needed for Moderate Pain . 20 tablet 0   • nystatin (MYCOSTATIN) 465459 UNIT/GM ointment APPLY TO THE AFFECTED AREA FOUR TIMES A DAY AS NEEDED  0   • ondansetron ODT (ZOFRAN-ODT) 4 MG disintegrating tablet Take 1 tablet by mouth Every 6 (Six) Hours As Needed for Nausea or Vomiting. 10 tablet 0   • oxyCODONE-acetaminophen (PERCOCET) 5-325 MG per tablet Take 1 tablet by mouth See Admin Instructions. Take 1 tablet by mouth every 4 to 6 hours as needed for pain  0   • valACYclovir (VALTREX) 1000 MG tablet TAKE TWO TABLETS BY MOUTH TWO TIMES A DAY FOR 10 DAYS  0   • verapamil PM (VERELAN PM) 180 MG 24 hr capsule Take 180 mg by mouth Daily.  2   • vitamin B-12 (CYANOCOBALAMIN) 500 MCG tablet Take 500 mcg by mouth Daily.  1     No current facility-administered medications for this visit.          Review of Systems   Constitutional: Positive for fatigue.   HENT: Positive for dental problem.    Eyes: Negative.    Respiratory: Negative.    Cardiovascular: Negative.    Gastrointestinal:  "Negative.    Endocrine: Negative.    Genitourinary: Negative.    Musculoskeletal: Positive for arthralgias and myalgias.   Skin: Negative.    Allergic/Immunologic: Negative.    Neurological: Negative.    Hematological: Negative.    Psychiatric/Behavioral: Positive for decreased concentration.    denies HEENT, cardiovascular, respiratory, liver, renal, GI/, endocrine, neuro, DERM, hematology, immunology, musculoskeletal disorders.    Objective   Physical Exam   Constitutional: She appears well-developed. No distress.   Vitals reviewed.    Blood pressure 131/85, pulse 110, height 170 cm (66.93\"), weight 115 kg (254 lb), not currently breastfeeding.    Mental Status Exam:   Hygiene:   good  Cooperation:  Cooperative  Eye Contact:  Good  Psychomotor Behavior:  Appropriate  Affect:  Appropriate  Hopelessness: Denies  Speech:  Normal  Thought Process:  Goal directed and Linear  Thought Content:  Normal  Suicidal:  None  Homicidal:  None  Hallucinations:  None  Delusion:  None  Memory:  Intact  Orientation:  Person, Place, Time and Situation  Reliability:  good  Insight:  Good  Judgement:  Good  Impulse Control:  Good  Physical/Medical Issues:  Yes hypothyroidism, fibromyalgia      Short-term goals: Patient will be compliant with clinic appointments.  Patient will be engaged in therapy, medication compliant with minimal side effects. Patient  will report decrease of symptoms and frequency.    Long-term goals: Patient will have minimal symptoms of depression with continued medication management. Patient will be compliant with treatment and appointments.       Problem list: Depression   Strengths: Motivated for treatment   Weaknesses: Limited coping skills     Assessment/Plan   Diagnoses and all orders for this visit:    Major depressive disorder, recurrent episode, moderate (CMS/HCC)  -     buPROPion XL (WELLBUTRIN XL) 150 MG 24 hr tablet; Take 1 tablet by mouth Every Morning.    Medication management  -     [facility] " Screen      UDS - negative   Request # : [id number] - [facility] reviewed     Impression: Patient experiencing worsening symptoms of depression since July.  She contributes this to multiple oral surgeries and the loss of her teeth.  We discussed benefits of psychotherapy and she declines at this time.  She has tried Wellbutrin  mg daily without any benefit.  We discussed trying Wellbutrin XL and patient is agreeable.    Plan:  Will add Wellbutrin  mg po daily as adjunct for depression  Encouraged psychotherapy  RTC 4 weeks         Discussed medication options. Discussed the risks, benefits, and side effects of the medication; client acknowledged and verbally consented.  Patient is aware to contact the [facility] with any worsening of symptom.  Patient is agreeable to go to the ER or call 911 should they begin SI/HI.

## 2020-01-21 NOTE — TREATMENT PLAN
Multi-Disciplinary Problems (from Behavioral Health Treatment Plan)    Active Problems     Problem: Depression  Start Date: 01/21/20    Problem Details:  The patient self-scales this problem as a 6 with 10 being the worst.       Goal Priority Start Date Expected End Date End Date    Patient will demonstrate the ability to initiate new constructive life skills outside of sessions on a consistent basis. -- 01/21/20 -- --    Goal Details:  Progress toward goal:  Not appropriate to rate progress toward goal since this is the initial treatment plan.       Goal Intervention Frequency Start Date End Date    Assist patient in setting attainable activities of daily living goals. PRN 01/21/20 --    Goal Intervention Frequency Start Date End Date    Provide education about depression PRN 01/21/20 --    Intervention Details:  Duration of treatment until until discharged.       Goal Intervention Frequency Start Date End Date    Assist patient in developing healthy coping strategies. PRN 01/21/20 --    Intervention Details:  Duration of treatment until until discharged.                          I have discussed and reviewed this treatment plan with the patient.  It has been printed for signatures.

## 2020-11-20 ENCOUNTER — APPOINTMENT (OUTPATIENT)
Dept: GENERAL RADIOLOGY | Facility: HOSPITAL | Age: 60
End: 2020-11-20

## 2020-11-20 ENCOUNTER — HOSPITAL ENCOUNTER (EMERGENCY)
Facility: HOSPITAL | Age: 60
Discharge: HOME | End: 2020-11-20
Attending: FAMILY MEDICINE | Admitting: FAMILY MEDICINE

## 2020-11-20 VITALS
HEIGHT: 65 IN | HEART RATE: 100 BPM | OXYGEN SATURATION: 97 % | WEIGHT: 253.53 LBS | TEMPERATURE: 98 F | DIASTOLIC BLOOD PRESSURE: 79 MMHG | SYSTOLIC BLOOD PRESSURE: 134 MMHG | BODY MASS INDEX: 42.24 KG/M2 | RESPIRATION RATE: 16 BRPM

## 2020-11-20 DIAGNOSIS — S63.501A FOREARM SPRAIN, RIGHT, INITIAL ENCOUNTER: Primary | ICD-10-CM

## 2020-11-20 DIAGNOSIS — T07.XXXA ABRASIONS OF MULTIPLE SITES: ICD-10-CM

## 2020-11-20 DIAGNOSIS — S01.81XA FOREHEAD LACERATION, INITIAL ENCOUNTER: ICD-10-CM

## 2020-11-20 DIAGNOSIS — S83.401A SPRAIN OF COLLATERAL LIGAMENT OF RIGHT KNEE, INITIAL ENCOUNTER: ICD-10-CM
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
## 2020-11-20 PROCEDURE — 73562 X-RAY EXAM OF KNEE 3: CPT | Performed by: RADIOLOGY

## 2020-11-20 PROCEDURE — 73110 X-RAY EXAM OF WRIST: CPT

## 2020-11-20 PROCEDURE — 99283 EMERGENCY DEPT VISIT LOW MDM: CPT

## 2020-11-20 PROCEDURE — 73090 X-RAY EXAM OF FOREARM: CPT | Performed by: RADIOLOGY

## 2020-11-20 PROCEDURE — 73562 X-RAY EXAM OF KNEE 3: CPT

## 2020-11-20 PROCEDURE — 73090 X-RAY EXAM OF FOREARM: CPT

## 2020-11-20 PROCEDURE — 73110 X-RAY EXAM OF WRIST: CPT | Performed by: RADIOLOGY

## 2020-11-20 RX ORDER — INDOMETHACIN 25 MG/1
25 CAPSULE ORAL 2 TIMES DAILY WITH MEALS
Qty: 15 CAPSULE | Refills: 0 | Status: SHIPPED | OUTPATIENT
Start: 2020-11-20

## 2020-11-20 RX ORDER — HYDROCODONE BITARTRATE AND ACETAMINOPHEN 5; 325 MG/1; MG/1
1 TABLET ORAL ONCE
Status: COMPLETED | OUTPATIENT
Start: 2020-11-20 | End: 2020-11-20

## 2020-11-20 RX ORDER — ORPHENADRINE CITRATE 100 MG/1
100 TABLET, EXTENDED RELEASE ORAL 2 TIMES DAILY
Qty: 20 TABLET | Refills: 0 | Status: SHIPPED | OUTPATIENT
Start: 2020-11-20

## 2020-11-20 RX ADMIN — HYDROCODONE BITARTRATE AND ACETAMINOPHEN 1 TABLET: 5; 325 TABLET ORAL at 14:03

## 2020-11-20 NOTE — ED PROVIDER NOTES
Subjective   This is a 60-year-old female who presents to the emergency department chief complaint fall times just prior to arrival.  Patient states that she was walking outside her back porch when she slipped falling on concrete.  Patient complains of right arm, right knee pain.  She also states she did hit her head but did not lose consciousness.  Patient did sustain multiple abrasions to her face, arm, knee.  Denies any other associated medical problems.      History provided by:  Patient   used: No    Fall  Mechanism of injury: fall    Injury location:  Face, leg and shoulder/arm  Facial injury location:  Face  Shoulder/arm injury location:  R forearm  Leg injury location:  R knee  Incident location:  Home  Time since incident:  1 hour  Arrived directly from scene: no    Fall:     Fall occurred:  Tripped and walking    Height of fall:  1    Impact surface:  Squire    Entrapped after fall: no    Protective equipment: none    Suspicion of alcohol use: no    Suspicion of drug use: no    Tetanus status:  Unknown  Prior to arrival data:     Bystander interventions:  None    Patient ambulatory at scene: no      Blood loss:  None    Orientation at scene:  Person, place, time and situation    Loss of consciousness: no      Amnesic to event: no      Airway interventions:  None    Breathing interventions:  None    IV access status:  None    IO access:  None    Fluids administered:  None    Cardiac interventions:  None    Medications administered:  None    Immobilization:  None  Associated symptoms: no abdominal pain, no back pain, no blindness, no chest pain, no difficulty breathing, no hearing loss, no loss of consciousness, no nausea and no neck pain    Risk factors: no anticoagulation therapy, no asthma, no beta blocker therapy, no CABG, no CHF, no COPD, no diabetes, no dialysis, no hemophilia, no pacemaker, no past MI, not pregnant and no steroid use        Review of Systems   HENT: Negative for  hearing loss.    Eyes: Negative for blindness.   Cardiovascular: Negative for chest pain.   Gastrointestinal: Negative for abdominal pain and nausea.   Musculoskeletal: Positive for arthralgias, joint swelling and myalgias. Negative for back pain and neck pain.   Skin: Positive for rash and wound.   Neurological: Negative for loss of consciousness.   All other systems reviewed and are negative.      Past Medical History:   Diagnosis Date   • Arthritis    • Disease of thyroid gland    • Fibromyalgia    • Fibromyalgia    • GERD (gastroesophageal reflux disease)    • Insomnia    • Migraine    • Migraines    • Nephrolithiasis    • PONV (postoperative nausea and vomiting)    • Spinal headache        Allergies   Allergen Reactions   • Codeine Hives and Itching       Past Surgical History:   Procedure Laterality Date   • ABDOMINAL SURGERY     • CARPAL TUNNEL RELEASE Bilateral    • CHOLECYSTECTOMY     • CRANIAL NEUROSTIMULATOR INSERTION/REPLACEMENT  01/09/2017    for migraines   • EYE SURGERY Bilateral     Cataract   • GASTRIC BANDING     • HAND SURGERY     • HYSTERECTOMY     • JOINT REPLACEMENT  2012   • JOINT REPLACEMENT Right 02/14/2017    right total knee  [facility]  dr farah   • KNEE SURGERY     • NM TOTAL KNEE ARTHROPLASTY Right 2/14/2017    Procedure: TOTAL KNEE ARTHROPLASTY;  Surgeon: Wilfred Farah MD;  Location: [facility];  Service: Orthopedics       Family History   Problem Relation Age of Onset   • Cancer Father    • Hypertension Father    • Hyperlipidemia Mother        Social History     Socioeconomic History   • Marital status:      Spouse name: Not on file   • Number of children: Not on file   • Years of education: Not on file   • Highest education level: Not on file   Tobacco Use   • Smoking status: Never Smoker   • Smokeless tobacco: Never Used   Substance and Sexual Activity   • Alcohol use: No   • Drug use: No   • Sexual activity: Defer           Objective   Physical Exam  Vitals signs and  nursing note reviewed.   Constitutional:       General: She is not in acute distress.     Appearance: Normal appearance. She is normal weight. She is not ill-appearing.   HENT:      Head: Normocephalic and atraumatic.        Right Ear: Tympanic membrane, ear canal and external ear normal. There is no impacted cerumen.      Left Ear: Tympanic membrane, ear canal and external ear normal. There is no impacted cerumen.      Nose: Nose normal. No congestion or rhinorrhea.      Mouth/Throat:      Mouth: Mucous membranes are moist.      Pharynx: Oropharynx is clear. No oropharyngeal exudate.   Eyes:      General: No scleral icterus.        Right eye: No discharge.         Left eye: No discharge.      Extraocular Movements: Extraocular movements intact.      Conjunctiva/sclera: Conjunctivae normal.      Pupils: Pupils are equal, round, and reactive to light.   Neck:      Musculoskeletal: Normal range of motion and neck supple. No neck rigidity or muscular tenderness.      Vascular: No carotid bruit.   Cardiovascular:      Rate and Rhythm: Normal rate and regular rhythm.      Pulses: Normal pulses.      Heart sounds: Normal heart sounds. No murmur. No friction rub. No gallop.    Pulmonary:      Effort: Pulmonary effort is normal. No respiratory distress.      Breath sounds: Normal breath sounds. No stridor. No wheezing, rhonchi or rales.   Chest:      Chest wall: No tenderness.   Abdominal:      General: Abdomen is flat. Bowel sounds are normal. There is no distension.      Palpations: Abdomen is soft. There is no mass.      Tenderness: There is no abdominal tenderness. There is no right CVA tenderness, left CVA tenderness, guarding or rebound.      Hernia: No hernia is present.   Musculoskeletal:         General: Swelling and tenderness present.      Right knee: She exhibits decreased range of motion and swelling.      Right forearm: She exhibits tenderness and swelling.   Lymphadenopathy:      Cervical: No cervical  adenopathy.   Skin:     General: Skin is warm.      Capillary Refill: Capillary refill takes less than 2 seconds.      Coloration: Skin is not jaundiced or pale.      Findings: No bruising, erythema, lesion or rash.   Neurological:      General: No focal deficit present.      Mental Status: She is alert and oriented to person, place, and time.      Cranial Nerves: No cranial nerve deficit.      Sensory: No sensory deficit.      Motor: No weakness.      Coordination: Coordination normal.      Gait: Gait normal.      Deep Tendon Reflexes: Reflexes normal.   Psychiatric:         Mood and Affect: Mood normal.         Behavior: Behavior normal.         Thought Content: Thought content normal.         Judgment: Judgment normal.         Splint - Cast - Strapping    Date/Time: 11/20/2020 2:24 PM  Performed by: Trevor Baez PA-C  Authorized by: Va Osborne DO     Consent:     Consent obtained:  Verbal    Consent given by:  Patient    Risks discussed:  Discoloration    Alternatives discussed:  No treatment  Pre-procedure details:     Sensation:  Normal    Skin color:  Pink   Procedure details:     Laterality:  Right    Location:  Arm    Arm:  R lower arm    Strapping: no      Cast type:  Long arm    Splint type:  Long arm    Supplies:  Prefabricated splint  Post-procedure details:     Pain:  Improved    Sensation:  Normal    Skin color:  N/v intact     Patient tolerance of procedure:  Tolerated well, no immediate complications  Comments:      N/V intact     Laceration Repair    Date/Time: 11/20/2020 2:31 PM  Performed by: Trevor Baez PA-C  Authorized by: Va Osborne DO     Consent:     Consent obtained:  Verbal    Consent given by:  Patient    Risks discussed:  Infection and pain    Alternatives discussed:  No treatment  Anesthesia (see MAR for exact dosages):     Anesthesia method:  Topical application and local infiltration    Local anesthetic:  Lidocaine 1% w/o epi  Laceration details:      Location:  Face    Face location:  Forehead    Length (cm):  0.5  Repair type:     Repair type:  Simple  Exploration:     Contaminated: no    Treatment:     Area cleansed with:  Betadine and Hibiclens    Amount of cleaning:  Standard    Irrigation solution:  Sterile saline    Irrigation volume:  1000    Irrigation method:  Pressure wash  Skin repair:     Repair method:  Tissue adhesive  Approximation:     Approximation:  Close  Post-procedure details:     Patient tolerance of procedure:  Tolerated well, no immediate complications               ED Course  ED Course as of Nov 20 1432 Fri Nov 20, 2020   1422 IMPRESSION: No acute bony abnormality     []   1423 IMPRESSION:  Right knee prosthesis  Alignment is anatomic     []   1423 IMPRESSION:  No acute bony abnormality     []      ED Course User Index  [] Trevor Baez PA-C                                           MDM    Final diagnoses:   Forearm sprain, right, initial encounter   Sprain of collateral ligament of right knee, initial encounter   Fall, initial encounter   Forehead laceration, initial encounter   Abrasions of multiple sites            Trevor Baez PA-C  11/20/20 1429       Trevor Baez PA-C  11/20/20 1432

## 2021-02-22 DIAGNOSIS — Z23 IMMUNIZATION DUE: ICD-10-CM

## 2021-03-25 ENCOUNTER — IMMUNIZATION (OUTPATIENT)
Dept: VACCINE CLINIC | Facility: HOSPITAL | Age: 61
End: 2021-03-25

## 2021-03-25 PROCEDURE — 0001A: CPT | Performed by: INTERNAL MEDICINE

## 2021-03-25 PROCEDURE — 91300 HC SARSCOV02 VAC 30MCG/0.3ML IM: CPT | Performed by: INTERNAL MEDICINE

## 2021-04-15 ENCOUNTER — IMMUNIZATION (OUTPATIENT)
Dept: VACCINE CLINIC | Facility: HOSPITAL | Age: 61
End: 2021-04-15

## 2021-04-15 PROCEDURE — 91300 HC SARSCOV02 VAC 30MCG/0.3ML IM: CPT | Performed by: INTERNAL MEDICINE

## 2021-04-15 PROCEDURE — 0002A: CPT | Performed by: INTERNAL MEDICINE

## 2021-12-01 NOTE — ED NOTES
Cleaned patients wound with Hibiclens and saline. Patient tolerated very well.       Karen Dewitt  11/20/20 1400     Hospital Day:  2d    Subjective: Patient is a 52y old  Female who presents with a chief complaint of CHF (30 Nov 2021 00:17)      Pt seen and evaluated at bedside.   Complaints:  Over the night Events:    Past Medical Hx:   Hypertension    Hyperlipidemia    Anxiety and depression    COPD, severe    CHF (congestive heart failure)    Cerebrovascular accident (CVA)    Type 2 diabetes mellitus    CKD (chronic kidney disease), stage II      Past Sx:  No significant past surgical history    No significant past surgical history      Allergies:  No Known Allergies    Current Meds:   Standng Meds:  aspirin enteric coated 81 milliGRAM(s) Oral daily  atorvastatin 80 milliGRAM(s) Oral at bedtime  budesonide 160 MICROgram(s)/formoterol 4.5 MICROgram(s) Inhaler 2 Puff(s) Inhalation two times a day  buMETAnide Infusion 1 mG/Hr (5 mL/Hr) IV Continuous <Continuous>  chlorhexidine 4% Liquid 1 Application(s) Topical <User Schedule>  clopidogrel Tablet 75 milliGRAM(s) Oral daily  dextrose 40% Gel 15 Gram(s) Oral once  dextrose 5%. 1000 milliLiter(s) (50 mL/Hr) IV Continuous <Continuous>  dextrose 5%. 1000 milliLiter(s) (100 mL/Hr) IV Continuous <Continuous>  dextrose 50% Injectable 25 Gram(s) IV Push once  dextrose 50% Injectable 12.5 Gram(s) IV Push once  dextrose 50% Injectable 25 Gram(s) IV Push once  enoxaparin Injectable 40 milliGRAM(s) SubCutaneous daily  fenofibrate Tablet 145 milliGRAM(s) Oral daily  glucagon  Injectable 1 milliGRAM(s) IntraMuscular once  influenza   Vaccine 0.5 milliLiter(s) IntraMuscular once  insulin lispro (ADMELOG) corrective regimen sliding scale   SubCutaneous three times a day before meals  magnesium sulfate  IVPB 2 Gram(s) IV Intermittent every 2 hours  metoprolol succinate ER 50 milliGRAM(s) Oral daily  pantoprazole    Tablet 40 milliGRAM(s) Oral before breakfast  potassium chloride   Powder 20 milliEquivalent(s) Oral once  QUEtiapine 25 milliGRAM(s) Oral at bedtime  sacubitril 49 mG/valsartan 51 mG 1 Tablet(s) Oral two times a day  sodium chloride 3%. 150 milliLiter(s) (50 mL/Hr) IV Continuous <Continuous>  sodium chloride 3%. 150 milliLiter(s) (50 mL/Hr) IV Continuous <Continuous>  spironolactone 25 milliGRAM(s) Oral daily    PRN Meds:  ALBUTerol    90 MICROgram(s) HFA Inhaler 2 Puff(s) Inhalation every 6 hours PRN Shortness of Breath and/or Wheezing      Vital Signs:   T(F): 97.6 (12-01-21 @ 06:00), Max: 97.6 (12-01-21 @ 06:00)  HR: 72 (12-01-21 @ 06:00) (72 - 100)  BP: 133/77 (12-01-21 @ 06:00) (117/74 - 149/73)  RR: 18 (12-01-21 @ 06:00) (18 - 18)  SpO2: 95% (11-30-21 @ 19:56) (94% - 95%)    Physical Exam:   GENERAL: NAD, Resting in bed  HEENT: NCAT  CHEST/LUNG: Clear to auscultation bilaterally; No wheezing or rubs.   HEART: Rb/l rales  ABDOMEN: Bowel sounds present; Soft, Nontender, Nondistended.   EXTREMITIES:  b/l pitting edema,  NERVOUS SYSTEM:  Alert & Oriented X3    FLUID BALANCE    11-30-21 @ 07:01  -  12-01-21 @ 07:00  --------------------------------------------------------  IN: 710 mL / OUT: 4600 mL / NET: -3890 mL    12-01-21 @ 07:01  -  12-01-21 @ 11:02  --------------------------------------------------------  IN: 200 mL / OUT: 600 mL / NET: -400 mL        Labs:                         13.0   4.68  )-----------( 273      ( 01 Dec 2021 05:00 )             40.8     Neutophil% 65.2, Lymphocyte% 25.4, Monocyte% 5.8, Bands% 0.6 12-01-21 @ 05:00    01 Dec 2021 05:00    142    |  98     |  17     ----------------------------<  102    3.5     |  24     |  0.8      Ca    8.9        01 Dec 2021 05:00  Phos  3.7       30 Nov 2021 06:54  Mg     1.2       01 Dec 2021 05:00    TPro  7.0    /  Alb  3.7    /  TBili  1.3    /  DBili  x      /  AST  17     /  ALT  14     /  AlkPhos  105    01 Dec 2021 05:00            Serum Pro-Brain Natriuretic Peptide: 5736 pg/mL (11-29-21 @ 15:10)    Troponin 0.05, CKMB --, CK -- 11-29-21 @ 15:10        Radiology:     < from: Xray Chest 1 View- PORTABLE-Routine (Xray Chest 1 View- PORTABLE-Routine in AM.) (11.30.21 @ 06:36) >    Impression:    Cardiomegaly bilateral opacifications, unchanged.      < end of copied text >

## 2022-07-12 ENCOUNTER — TELEPHONE (OUTPATIENT)
Dept: ORTHOPEDIC SURGERY | Facility: CLINIC | Age: 62
End: 2022-07-12

## 2022-07-12 NOTE — TELEPHONE ENCOUNTER
Caller:  PATIENT    Relationship to patient: SELF     Best call back number: [phone]    Chief complaint: LEFT HIP PAIN    Type of visit: NEW PROBLEM    Requested date: ASAP    If rescheduling, when is the original appointment: 08.31.22 AT 9:00 AM    Additional notes: PATIENT CALLED TO SCHEDULE AN APPT WITH DR. LÓPEZ FOR LEFT HIP PAIN. PATIENT STATED SHE SEEN DR. LÓPEZ FOR HER LEFT HIP PREVIOUSLY BUT DOES NOT REMEMBER THE LAST TIME SHE HAS SEEN HIM FOR THIS DIAGNOSIS & IT'S BEEN OVER THREE YEARS SO IT WOULD BE A NEW PROBLEM. I WENT AHEAD AND SCHEDULED PATIENT FOR DR. LÓPEZ'S NEXT AVAILABLE NEW PROBLEM APPT BUT SENDING A TELEPHONE ENCOUNTER DUE TO THE REF TOOL STATING TO SEND A TELEPHONE MESSAGE FOR SCHEDULING FOR ESTABLISHED PATIENT WITH LIMITED AVAILABILITY.

## 2022-08-16 DIAGNOSIS — M25.552 LEFT HIP PAIN: Primary | ICD-10-CM

## 2022-08-31 ENCOUNTER — HOSPITAL ENCOUNTER (OUTPATIENT)
Dept: GENERAL RADIOLOGY | Facility: HOSPITAL | Age: 62
Discharge: HOME | End: 2022-08-31
Admitting: ORTHOPAEDIC SURGERY

## 2022-08-31 ENCOUNTER — OFFICE VISIT (OUTPATIENT)
Dept: ORTHOPEDIC SURGERY | Facility: CLINIC | Age: 62
End: 2022-08-31

## 2022-08-31 VITALS — HEIGHT: 65 IN | BODY MASS INDEX: 39.99 KG/M2 | WEIGHT: 240 LBS

## 2022-08-31 DIAGNOSIS — M70.62 GREATER TROCHANTERIC BURSITIS OF LEFT HIP: Primary | ICD-10-CM

## 2022-08-31 DIAGNOSIS — M25.552 LEFT HIP PAIN: ICD-10-CM

## 2022-08-31 PROCEDURE — 20610 DRAIN/INJ JOINT/BURSA W/O US: CPT | Performed by: ORTHOPAEDIC SURGERY

## 2022-08-31 PROCEDURE — 73502 X-RAY EXAM HIP UNI 2-3 VIEWS: CPT | Performed by: RADIOLOGY

## 2022-08-31 PROCEDURE — 73502 X-RAY EXAM HIP UNI 2-3 VIEWS: CPT

## 2022-08-31 PROCEDURE — 99213 OFFICE O/P EST LOW 20 MIN: CPT | Performed by: ORTHOPAEDIC SURGERY

## 2022-08-31 RX ORDER — GALCANEZUMAB 120 MG/ML
INJECTION, SOLUTION SUBCUTANEOUS
COMMUNITY
Start: 2022-08-01

## 2022-08-31 RX ORDER — UBROGEPANT 100 MG/1
TABLET ORAL
COMMUNITY
Start: 2022-05-31

## 2022-08-31 RX ADMIN — LIDOCAINE HYDROCHLORIDE 4 ML: 10 INJECTION, SOLUTION INFILTRATION; PERINEURAL at 10:38

## 2022-08-31 RX ADMIN — METHYLPREDNISOLONE ACETATE 40 MG: 40 INJECTION, SUSPENSION INTRA-ARTICULAR; INTRALESIONAL; INTRAMUSCULAR; SOFT TISSUE at 10:38

## 2022-09-02 RX ORDER — LIDOCAINE HYDROCHLORIDE 10 MG/ML
4 INJECTION, SOLUTION INFILTRATION; PERINEURAL
Status: COMPLETED | OUTPATIENT
Start: 2022-08-31 | End: 2022-08-31

## 2022-09-02 RX ORDER — METHYLPREDNISOLONE ACETATE 40 MG/ML
40 INJECTION, SUSPENSION INTRA-ARTICULAR; INTRALESIONAL; INTRAMUSCULAR; SOFT TISSUE
Status: COMPLETED | OUTPATIENT
Start: 2022-08-31 | End: 2022-08-31

## 2022-09-21 ENCOUNTER — OFFICE VISIT (OUTPATIENT)
Dept: ORTHOPEDIC SURGERY | Facility: CLINIC | Age: 62
End: 2022-09-21

## 2022-09-21 VITALS — BODY MASS INDEX: 40.04 KG/M2 | HEIGHT: 65 IN | WEIGHT: 240.3 LBS

## 2022-09-21 DIAGNOSIS — M70.62 GREATER TROCHANTERIC BURSITIS OF LEFT HIP: Primary | ICD-10-CM

## 2022-09-21 PROCEDURE — 20610 DRAIN/INJ JOINT/BURSA W/O US: CPT | Performed by: ORTHOPAEDIC SURGERY

## 2022-09-21 RX ADMIN — LIDOCAINE HYDROCHLORIDE 3 ML: 10 INJECTION, SOLUTION EPIDURAL; INFILTRATION; INTRACAUDAL; PERINEURAL at 13:14

## 2022-09-21 RX ADMIN — METHYLPREDNISOLONE ACETATE 80 MG: 80 INJECTION, SUSPENSION INTRA-ARTICULAR; INTRALESIONAL; INTRAMUSCULAR; SOFT TISSUE at 13:14

## 2022-09-21 NOTE — PROGRESS NOTES
Follow-up Visit         Patient: Roseline Steiner  YOB: 1960  Date of Encounter: 09/21/2022      Chief  Complaint:   Chief Complaint   Patient presents with   • Left Hip - Pain, Follow-up         HPI:  Roseline Steiner, 62 y.o. female presents in follow-up to hip pain.  She was last treated with steroid injection August 31, 2022 she reports minimal response.  She has previously received steroid injection with good response.  Her major complaint is difficulty sleeping and lying on her left side.  Not experience weakness or numbness to her left leg.        Medical History:  Patient Active Problem List   Diagnosis   • Migraine with status migrainosus   • Arthralgia of left hip   • Greater trochanteric bursitis   • Closed nondisplaced fracture of right patella   • Status post total right knee replacement   • Pes anserine bursitis     Past Medical History:   Diagnosis Date   • Arthritis    • Disease of thyroid gland    • Fibromyalgia    • Fibromyalgia    • GERD (gastroesophageal reflux disease)    • Insomnia    • Migraine    • Migraines    • Nephrolithiasis    • PONV (postoperative nausea and vomiting)    • Spinal headache          Social History:  Social History     Socioeconomic History   • Marital status:    Tobacco Use   • Smoking status: Never Smoker   • Smokeless tobacco: Never Used   Substance and Sexual Activity   • Alcohol use: No   • Drug use: No   • Sexual activity: Defer         Current Medications:    Current Outpatient Medications:   •  baclofen (LIORESAL) 10 MG tablet, TAKE ONE TABLET BY MOUTH EVERY MORNING AND TAKE TWO TABLETS EVERY EVENING, Disp: , Rfl: 2  •  butorphanol (STADOL) 10 MG/ML nasal spray, 1 spray into each nostril As Needed for mild pain (1-3) or moderate pain (4-6) (migraine, take if migranol doesn't work). May repeat 1 spray in 60 min if first spray doesn't achieve good migraine control., Disp: , Rfl: 2  •  Emgality 120 MG/ML auto-injector pen, INJECT 2 ML  SUBCUTANEOUSLY FOR THE FIRST MONTH, THEN INJECT 1 ML ONCE EVERY MONTH THEREAFTER, Disp: , Rfl:   •  levothyroxine (SYNTHROID, LEVOTHROID) 100 MCG tablet, Take 100 mcg by mouth daily., Disp: , Rfl:   •  nortriptyline (PAMELOR) 25 MG capsule, TAKE 1 OR 2 CAPSULES BY MOUTH EVERY NIGHT AT BEDTIME AS NEEDED, Disp: , Rfl: 2  •  promethazine (PHENERGAN) 25 MG tablet, Take 25 mg by mouth Every 8 (Eight) Hours As Needed., Disp: , Rfl: 2  •  ubrogepant 100 MG tablet, TAKE ONE TABLET BY MOUTH DAILY AS NEEDED AT ONSET OF MIGRAINE HEADACHE, Disp: , Rfl:   •  verapamil (VERELAN PM) 200 MG capsule sustained-release 24 hr capsule, TAKE ONE CAPSULE BY MOUTH DAILY BEFORE EATING, Disp: , Rfl: 5  •  amoxicillin-clavulanate (AUGMENTIN) 500-125 MG per tablet, TAKE ONE TABLET BY MOUTH TWO TIMES A DAY FOR 10 DAYS FINISH ALL OF THIS MEDICATION, Disp: , Rfl: 0  •  buPROPion XL (WELLBUTRIN XL) 150 MG 24 hr tablet, Take 1 tablet by mouth Every Morning., Disp: 30 tablet, Rfl: 0  •  Cholecalciferol (VITAMIN D3) 97188 units capsule, Take 50,000 Units by mouth 1 (One) Time Per Week., Disp: , Rfl: 5  •  FLUoxetine (PROzac) 20 MG capsule, Take 20 mg by mouth 3 (three) times a day., Disp: , Rfl:   •  gabapentin (NEURONTIN) 300 MG capsule, Take 300 mg by mouth 2 (two) times a day., Disp: , Rfl:   •  indomethacin (INDOCIN) 25 MG capsule, Take 1 capsule by mouth 2 (Two) Times a Day With Meals., Disp: 15 capsule, Rfl: 0  •  naproxen (NAPROSYN) 500 MG tablet, Take 1 tablet by mouth 2 (Two) Times a Day As Needed for Moderate Pain ., Disp: 20 tablet, Rfl: 0  •  nystatin (MYCOSTATIN) 709946 UNIT/GM ointment, APPLY TO THE AFFECTED AREA FOUR TIMES A DAY AS NEEDED, Disp: , Rfl: 0  •  OnabotulinumtoxinA (BOTOX) 200 units reconstituted solution, Inject  into the appropriate muscle as directed by prescriber 1 (One) Time., Disp: , Rfl:   •  ondansetron ODT (ZOFRAN-ODT) 4 MG disintegrating tablet, Take 1 tablet by mouth Every 6 (Six) Hours As Needed for Nausea or  Vomiting., Disp: 10 tablet, Rfl: 0  •  orphenadrine (NORFLEX) 100 MG 12 hr tablet, Take 1 tablet by mouth 2 (Two) Times a Day., Disp: 20 tablet, Rfl: 0  •  oxyCODONE-acetaminophen (PERCOCET) 5-325 MG per tablet, Take 1 tablet by mouth See Admin Instructions. Take 1 tablet by mouth every 4 to 6 hours as needed for pain, Disp: , Rfl: 0  •  valACYclovir (VALTREX) 1000 MG tablet, TAKE TWO TABLETS BY MOUTH TWO TIMES A DAY FOR 10 DAYS, Disp: , Rfl: 0  •  verapamil PM (VERELAN PM) 180 MG 24 hr capsule, Take 180 mg by mouth Daily., Disp: , Rfl: 2  •  vitamin B-12 (CYANOCOBALAMIN) 500 MCG tablet, Take 500 mcg by mouth Daily., Disp: , Rfl: 1      Allergies:  Allergies   Allergen Reactions   • Codeine Hives and Itching         Family History:  Family History   Problem Relation Age of Onset   • Cancer Father    • Hypertension Father    • Hyperlipidemia Mother          Surgical History:  Past Surgical History:   Procedure Laterality Date   • ABDOMINAL SURGERY     • BOTOX INJECTION N/A 08/05/2022    for mirgrains, every 3 months   • CARPAL TUNNEL RELEASE Bilateral    • CHOLECYSTECTOMY     • CRANIAL NEUROSTIMULATOR INSERTION/REPLACEMENT  01/09/2017    for migraines   • EYE SURGERY Bilateral     Cataract   • GASTRIC BANDING     • HAND SURGERY     • HYSTERECTOMY     • JOINT REPLACEMENT  2012   • JOINT REPLACEMENT Right 02/14/2017    right total knee  [facility]  dr farah   • KNEE SURGERY     • GA TOTAL KNEE ARTHROPLASTY Right 02/14/2017    Procedure: TOTAL KNEE ARTHROPLASTY;  Surgeon: Wilfred Farah MD;  Location: [facility];  Service: Orthopedics         Radiology:   XR Hip With or Without Pelvis 2 - 3 View Left    Result Date: 8/31/2022    Mild left hip osteoarthritic change.  This report was finalized on 8/31/2022 9:23 AM by Dr. Chris Ring MD.          Examination:   Examination left hip reveals exquisite tenderness directly over the greater trochanteric bursa.  Her mobility is full with normal neurovascular  status.        Assessment & Plan:   62 y.o. female presents follow-up greater trochanteric bursitis left hip with good response in the past.  They are discussing options today she is provided steroid injection Depo-Medrol 80 mg lidocaine block left greater trochanteric bursa region.  She will follow-up with no response we will consider MRI if she fails to improve.         Diagnosis Plan   1. Greater trochanteric bursitis of left hip           Large Joint Arthrocentesis: L greater trochanteric bursa  Date/Time: 9/21/2022 1:14 PM  Consent given by: patient  Site marked: site marked  Timeout: Immediately prior to procedure a time out was called to verify the correct patient, procedure, equipment, support staff and site/side marked as required   Supporting Documentation  Indications: pain   Procedure Details  Location: hip - L greater trochanteric bursa  Preparation: Patient was prepped and draped in the usual sterile fashion  Needle size: 25 G  Approach: lateral  Medications administered: 3 mL lidocaine PF 1% 1 %; 80 mg methylPREDNISolone acetate 80 MG/ML  Patient tolerance: patient tolerated the procedure well with no immediate complications              Cc:  Kreis, Samuel Duane, MD              This document has been electronically signed by Wilfred Randle MD   September 21, 2022 13:14 EDT

## 2022-09-28 RX ORDER — LIDOCAINE HYDROCHLORIDE 10 MG/ML
3 INJECTION, SOLUTION EPIDURAL; INFILTRATION; INTRACAUDAL; PERINEURAL
Status: COMPLETED | OUTPATIENT
Start: 2022-09-21 | End: 2022-09-21

## 2022-09-28 RX ORDER — METHYLPREDNISOLONE ACETATE 80 MG/ML
80 INJECTION, SUSPENSION INTRA-ARTICULAR; INTRALESIONAL; INTRAMUSCULAR; SOFT TISSUE
Status: COMPLETED | OUTPATIENT
Start: 2022-09-21 | End: 2022-09-21

## 2022-12-29 ENCOUNTER — TRANSCRIBE ORDERS (OUTPATIENT)
Dept: ADMINISTRATIVE | Facility: HOSPITAL | Age: 62
End: 2022-12-29
Payer: COMMERCIAL

## 2022-12-29 ENCOUNTER — HOSPITAL ENCOUNTER (OUTPATIENT)
Dept: GENERAL RADIOLOGY | Facility: HOSPITAL | Age: 62
Discharge: HOME | End: 2022-12-29
Admitting: NURSE PRACTITIONER

## 2022-12-29 DIAGNOSIS — M25.522 LEFT ELBOW PAIN: Primary | ICD-10-CM

## 2022-12-29 DIAGNOSIS — M79.622 LEFT UPPER ARM PAIN: ICD-10-CM

## 2022-12-29 DIAGNOSIS — M25.522 LEFT ELBOW PAIN: ICD-10-CM

## 2022-12-29 PROCEDURE — 73080 X-RAY EXAM OF ELBOW: CPT

## 2022-12-29 PROCEDURE — 73060 X-RAY EXAM OF HUMERUS: CPT | Performed by: RADIOLOGY

## 2022-12-29 PROCEDURE — 73060 X-RAY EXAM OF HUMERUS: CPT

## 2022-12-29 PROCEDURE — 73080 X-RAY EXAM OF ELBOW: CPT | Performed by: RADIOLOGY

## 2023-01-01 NOTE — PROGRESS NOTES
Acute Care - Physical Therapy Treatment Note  [facility]     Patient Name: Roseline Steiner  : 1960  MRN: [id number]  Today's Date: 2017  Onset of Illness/Injury or Date of Surgery Date: 17  Date of Referral to PT: 17  Referring Physician: Dr. Randle    Admit Date: 2017    Visit Dx:    ICD-10-CM ICD-9-CM   1. Primary osteoarthritis of right knee M17.11 715.16   2. Preop testing Z01.818 V72.84     Patient Active Problem List   Diagnosis   • Migraine with status migrainosus   • Arthralgia of left hip   • Greater trochanteric bursitis   • Primary osteoarthritis of right knee               Adult Rehabilitation Note       17 1437 17 1547 02/15/17 1547    Rehab Assessment/Intervention    Discipline physical therapist  -BC physical therapist  -BC physical therapist  -BC    Document Type therapy note (daily note)  -BC therapy note (daily note)  -BC therapy note (daily note)  -BC    Subjective Information agree to therapy;complains of;weakness;fatigue  -BC agree to therapy;complains of;weakness  -BC agree to therapy;complains of;weakness  -BC    Patient Effort, Rehab Treatment good  -BC good  -BC good  -BC    Recorded by [BC] Mackenzie Foster, PT [BC] Mackenzie Foster, PT [BC] Mackenzie Foster, PT    Pain Assessment    Pain Assessment Hernandez-Baker FACES  -BC Hernandez-Baker FACES  -BC Hernandez-Baker FACES  -BC    Hernandez-Baker FACES Pain Rating 6  -BC 6  -BC 4  -BC    Pain Location Knee  -BC Knee  -BC Knee  -BC    Recorded by [BC] Mackenzie Foster, PT [BC] Mackenzie Foster, PT [BC] Mackenzie Foster, PT    Cognitive Assessment/Intervention    Current Cognitive/Communication Assessment functional  -BC functional  -BC functional  -BC    Orientation Status oriented x 4  -BC oriented x 4  -BC oriented x 4  -BC    Follows Commands/Answers Questions 100% of the time  -% of the time  -% of the time;able to follow single-step instructions  -BC    Personal Safety WNL/WFL  -BC WNL/WFL;decreased  awareness, need for assist;decreased awareness, need for safety  -BC WNL/WFL;decreased awareness, need for safety  -BC    Personal Safety Interventions gait belt;nonskid shoes/slippers when out of bed  -BC gait belt;nonskid shoes/slippers when out of bed  -BC gait belt;nonskid shoes/slippers when out of bed  -BC    Recorded by [BC] Mackenzie Foster, PT [BC] Mackenzie Foster PT [BC] Mackenzie Foster PT    Bed Mobility, Assessment/Treatment    Bed Mobility, Assistive Device bed rails  -BC bed rails  -BC bed rails  -BC    Bed Mob, Supine to Sit, [place] verbal cues required;nonverbal cues required (demo/gesture);2 person assist required;minimum assist (75% patient effort)  -BC verbal cues required;nonverbal cues required (demo/gesture);moderate assist (50% patient effort);2 person assist required  -BC verbal cues required;nonverbal cues required (demo/gesture);moderate assist (50% patient effort);2 person assist required  -BC    Bed Mob, Sit to Supine, [place] verbal cues required;nonverbal cues required (demo/gesture);2 person assist required;minimum assist (75% patient effort)  -BC verbal cues required;nonverbal cues required (demo/gesture);moderate assist (50% patient effort);2 person assist required  -BC verbal cues required;nonverbal cues required (demo/gesture);moderate assist (50% patient effort);2 person assist required  -BC    Recorded by [BC] Mackenzie Foster, PT [BC] Mackenzie Foster, PT [BC] Mackenzie Foster, PT    Transfer Assessment/Treatment    Transfers, Sit-Stand [place] verbal cues required;nonverbal cues required (demo/gesture);minimum assist (75% patient effort);moderate assist (50% patient effort);2 person assist required  -BC verbal cues required;nonverbal cues required (demo/gesture);minimum assist (75% patient effort);moderate assist (50% patient effort);2 person assist required  -BC verbal cues required;nonverbal cues required (demo/gesture);minimum assist (75% patient  effort);moderate assist (50% patient effort);2 person assist required  -BC    Transfers, Stand-Sit [place] verbal cues required;nonverbal cues required (demo/gesture);moderate assist (50% patient effort);minimum assist (75% patient effort);2 person assist required  -BC verbal cues required;nonverbal cues required (demo/gesture);moderate assist (50% patient effort);minimum assist (75% patient effort);2 person assist required  -BC verbal cues required;nonverbal cues required (demo/gesture);moderate assist (50% patient effort);minimum assist (75% patient effort);2 person assist required  -BC    Transfers, Sit-Stand-Sit, Assist Device standard walker  -BC standard walker  -BC standard walker  -BC    Recorded by [BC] Mackenzie Foster PT [BC] Mackenzie Foster PT [BC] Mackenzie Foster PT    Gait Assessment/Treatment    Gait, [place] Level minimum assist (75% patient effort);2 person assist required;verbal cues required;nonverbal cues required (demo/gesture)  -BC minimum assist (75% patient effort);2 person assist required;verbal cues required;nonverbal cues required (demo/gesture)  -BC minimum assist (75% patient effort);2 person assist required;verbal cues required;nonverbal cues required (demo/gesture)  -BC    Gait, Assistive Device rolling walker  -BC rolling walker  -BC rolling walker  -BC    Gait, Distance (Feet) 80   no pm session due to getting blood  -   120pm  -BC 80   75 pm  -BC    Recorded by [BC] Mackenzie Foster PT [BC] Mackenzie Foster PT [BC] Mackenzie Foster PT    Positioning and Restraints    Pre-Treatment Position in bed  -BC in bed  -BC in bed  -BC    Post Treatment Position chair  -BC chair  -BC chair  -BC    In Bed notified nsg;supine;call light within reach;encouraged to call for assist;side rails up x3  -BC notified nsg;supine;call light within reach;encouraged to call for assist;with family/caregiver;side rails up x3   pm  -BC notified nsg;supine;call light within  reach;encouraged to call for assist;with family/caregiver;side rails up x3   pm  -BC    In Chair  notified nsg;sitting;encouraged to call for assist;call light within reach  -BC notified nsg;sitting;call light within reach;encouraged to call for assist   am  -BC    Recorded by [BC] Mackenzie Foster, PT [BC] Mackenzie Foster, PT [BC] Mackenzie Foster, PT      User Key  (r) = Recorded By, (t) = Taken By, (c) = Cosigned By    Initials Name Effective Dates    BC Mackenzie Foster, PT 03/14/16 -                 IP PT Goals       02/14/17 1816          Bed Mobility PT LTG    Bed Mobility PT LTG, Date Established 02/14/17  -BC      Bed Mobility PT LTG, Time to Achieve 2 - 3 days  -BC      Bed Mobility PT LTG, Activity Type all bed mobility  -BC      Bed Mobility PT LTG, [place] Level contact guard assist  -BC      Bed Mobility PT Goal  LTG, Assist Device bed rails  -BC      Transfer Training PT LTG    Transfer Training PT LTG, Time to Achieve 2 - 3 days  -BC      Transfer Training PT LTG, Activity Type all transfers  -BC      Transfer Training PT LTG, [place] Level contact guard assist  -BC      Transfer Training PT LTG, Assist Device walker, rolling  -BC      Gait Training PT LTG    Gait Training Goal PT LTG, Date Established 02/14/17  -BC      Gait Training Goal PT LTG, Time to Achieve 2 - 3 days  -BC      Gait Training Goal PT LTG, [place] Level contact guard assist  -BC      Gait Training Goal PT LTG, Assist Device walker, rolling  -BC      Gait Training Goal PT LTG, Distance to Achieve 80  -BC        User Key  (r) = Recorded By, (t) = Taken By, (c) = Cosigned By    Initials Name Provider Type    BC Mackenzie Foster, PT Physical Therapist          Physical Therapy Education     Title: PT OT SLP Therapies (Done)     Topic: Physical Therapy (Done)     Point: Mobility training (Done)    Learning Progress Summary    Learner Readiness Method Response Comment Documented by Status   Patient Acceptance E VU  BC  02/17/17 1447 Done    Acceptance E VU  BC 02/16/17 1606 Done    Acceptance E NR  BC 02/15/17 1612 Active    Acceptance E NR  BC 02/14/17 1816 Active               Point: Home exercise program (Done)    Learning Progress Summary    Learner Readiness Method Response Comment Documented by Status   Patient Acceptance E VU  BC 02/17/17 1447 Done    Acceptance E VU  BC 02/16/17 1606 Done    Acceptance E NR  BC 02/15/17 1612 Active    Acceptance E NR  BC 02/14/17 1816 Active               Point: Body mechanics (Done)    Learning Progress Summary    Learner Readiness Method Response Comment Documented by Status   Patient Acceptance E VU  BC 02/17/17 1447 Done    Acceptance E VU  BC 02/16/17 1606 Done    Acceptance E NR  BC 02/15/17 1612 Active    Acceptance E NR  BC 02/14/17 1816 Active               Point: Precautions (Done)    Learning Progress Summary    Learner Readiness Method Response Comment Documented by Status   Patient Acceptance E VU  BC 02/17/17 1447 Done    Acceptance E VU  BC 02/16/17 1606 Done    Acceptance E NR  BC 02/15/17 1612 Active    Acceptance E NR  BC 02/14/17 1816 Active                      User Key     Initials Effective Dates Name Provider Type Discipline    BC 03/14/16 -  Mackenzie Foster, PT Physical Therapist PT                    PT Recommendation and Plan  Planned Therapy Interventions: balance training, bed mobility training, gait training, home exercise program, patient/family education, strengthening, transfer training  PT Frequency: 2 times/day, 5 times/wk, per priority policy             Outcome Measures       02/17/17 1400 02/16/17 1607 02/15/17 1600    How much help from another person do you currently need...    Turning from your back to your side while in flat bed without using bedrails? 3  -BC 3  -BC 3  -BC    Moving from lying on back to sitting on the side of a flat bed without bedrails? 3  -BC 3  -BC 2  -BC    Moving to and from a bed to a chair (including a wheelchair)? 3  -BC 3   -BC 3  -BC    Standing up from a chair using your arms (e.g., wheelchair, bedside chair)? 3  -BC 3  -BC 3  -BC    Climbing 3-5 steps with a railing? 2  -BC 2  -BC 2  -BC    To walk in hospital room? 3  -BC 3  -BC 3  -BC    AM-PAC 6 Clicks Score 17  -BC 17  -BC 16  -BC    Functional Assessment    Outcome Measure Options AM-PAC 6 Clicks Basic Mobility (PT)  -BC AM-PAC 6 Clicks Basic Mobility (PT)  -BC AM-PAC 6 Clicks Basic Mobility (PT)  -BC      02/14/17 1800          How much help from another person do you currently need...    Turning from your back to your side while in flat bed without using bedrails? 2  -BC      Moving from lying on back to sitting on the side of a flat bed without bedrails? 2  -BC      Moving to and from a bed to a chair (including a wheelchair)? 2  -BC      Standing up from a chair using your arms (e.g., wheelchair, bedside chair)? 2  -BC      Climbing 3-5 steps with a railing? 1  -BC      To walk in hospital room? 2  -BC      AM-PAC 6 Clicks Score 11  -BC      Functional Assessment    Outcome Measure Options AM-PAC 6 Clicks Basic Mobility (PT)  -BC        User Key  (r) = Recorded By, (t) = Taken By, (c) = Cosigned By    Initials Name Provider Type    BC Mackenzie Foster PT Physical Therapist           Time Calculation:         PT Charges       02/17/17 1448          Time Calculation    Start Time --   30  -BC      PT Received On 02/17/17  -BC        User Key  (r) = Recorded By, (t) = Taken By, (c) = Cosigned By    Initials Name Provider Type    BC Mackenzie Foster PT Physical Therapist          Therapy Charges for Today     Code Description Service Date Service Provider Modifiers Qty    58820672656 HC GAIT TRAINING EA 15 MIN 2/16/2017 Mackenzie Foster, PT GP 2    17635003521 HC PT THERAPEUTIC ACT EA 15 MIN 2/16/2017 Mackenzie Foster, PT GP 2    83480685527 HC PT THER SUPP EA 15 MIN 2/16/2017 Mackenzie Foster, PT GP 4    27876566562 HC GAIT TRAINING EA 15 MIN 2/17/2017 Mackenzie Foster PT  GP 1    88146047159 HC PT THERAPEUTIC ACT EA 15 MIN 2/17/2017 Mackenzie Foster, PT GP 1    10989363901 HC PT THER SUPP EA 15 MIN 2/17/2017 Mackenzie Foster, PT GP 2          PT G-Codes  Outcome Measure Options: AM-PAC 6 Clicks Basic Mobility (PT)  Score: 11  Functional Limitation: Mobility: Walking and moving around  Mobility: Walking and Moving Around Current Status (): At least 60 percent but less than 80 percent impaired, limited or restricted  Mobility: Walking and Moving Around Goal Status (): At least 40 percent but less than 60 percent impaired, limited or restricted    Mackenzie Foster, PT  2/17/2017        No

## 2023-01-17 DIAGNOSIS — M25.512 LEFT SHOULDER PAIN, UNSPECIFIED CHRONICITY: Primary | ICD-10-CM

## 2023-02-06 ENCOUNTER — HOSPITAL ENCOUNTER (OUTPATIENT)
Dept: GENERAL RADIOLOGY | Facility: HOSPITAL | Age: 63
Discharge: HOME | End: 2023-02-06
Admitting: ORTHOPAEDIC SURGERY
Payer: COMMERCIAL

## 2023-02-06 ENCOUNTER — OFFICE VISIT (OUTPATIENT)
Dept: ORTHOPEDIC SURGERY | Facility: CLINIC | Age: 63
End: 2023-02-06
Payer: COMMERCIAL

## 2023-02-06 VITALS — BODY MASS INDEX: 40.04 KG/M2 | HEIGHT: 65 IN | WEIGHT: 240.3 LBS

## 2023-02-06 DIAGNOSIS — S40.012A CONTUSION OF LEFT SHOULDER, INITIAL ENCOUNTER: Primary | ICD-10-CM

## 2023-02-06 DIAGNOSIS — S43.402A SPRAIN OF LEFT SHOULDER, UNSPECIFIED SHOULDER SPRAIN TYPE, INITIAL ENCOUNTER: ICD-10-CM

## 2023-02-06 DIAGNOSIS — M25.512 LEFT SHOULDER PAIN, UNSPECIFIED CHRONICITY: ICD-10-CM

## 2023-02-06 PROCEDURE — 73030 X-RAY EXAM OF SHOULDER: CPT | Performed by: RADIOLOGY

## 2023-02-06 PROCEDURE — 99213 OFFICE O/P EST LOW 20 MIN: CPT | Performed by: ORTHOPAEDIC SURGERY

## 2023-02-06 PROCEDURE — 73030 X-RAY EXAM OF SHOULDER: CPT

## 2023-02-06 NOTE — PROGRESS NOTES
Established New Problem         Patient: Roseline Steiner  YOB: 1960  Date of Encounter: 02/06/2023        Chief  Complaint:   Chief Complaint   Patient presents with   • Left Shoulder - Pain     New problem           HPI:  Roseline Steiner, 62 y.o. female presents in follow-up with new complaint of left shoulder and arm pain which began November 24, 2022 when she fell striking her arm against a door frame.  She struck both her shoulder and arm.  Since then she has had pain radiating from her proximal arm distally to the dorsal aspect of her hand.  She has not experienced persistent numbness to her hand.  Her injury was sustained 6 weeks ago and she reports very little improvement.  Past medical history is noteworthy for left shoulder surgery in the past with acromioplasty many years ago she has done well up until this episode.  She suffers from migraine headaches and has a neurostimulator in place and is unable to receive MRI.        Medical History:  Patient Active Problem List   Diagnosis   • Migraine with status migrainosus   • Arthralgia of left hip   • Greater trochanteric bursitis   • Closed nondisplaced fracture of right patella   • Status post total right knee replacement   • Pes anserine bursitis     Past Medical History:   Diagnosis Date   • Arthritis    • Disease of thyroid gland    • Fibromyalgia    • Fibromyalgia    • GERD (gastroesophageal reflux disease)    • Insomnia    • Migraine    • Migraines    • Nephrolithiasis    • PONV (postoperative nausea and vomiting)    • Spinal headache          Social History:  Social History     Socioeconomic History   • Marital status:    Tobacco Use   • Smoking status: Never   • Smokeless tobacco: Never   Substance and Sexual Activity   • Alcohol use: No   • Drug use: No   • Sexual activity: Defer         Current Medications:    Current Outpatient Medications:   •  baclofen (LIORESAL) 10 MG tablet, TAKE ONE TABLET BY MOUTH EVERY MORNING AND TAKE TWO  TABLETS EVERY EVENING, Disp: , Rfl: 2  •  butorphanol (STADOL) 10 MG/ML nasal spray, 1 spray into each nostril As Needed for mild pain (1-3) or moderate pain (4-6) (migraine, take if migranol doesn't work). May repeat 1 spray in 60 min if first spray doesn't achieve good migraine control., Disp: , Rfl: 2  •  Emgality 120 MG/ML auto-injector pen, INJECT 2 ML SUBCUTANEOUSLY FOR THE FIRST MONTH, THEN INJECT 1 ML ONCE EVERY MONTH THEREAFTER, Disp: , Rfl:   •  levothyroxine (SYNTHROID, LEVOTHROID) 100 MCG tablet, Take 100 mcg by mouth daily., Disp: , Rfl:   •  nortriptyline (PAMELOR) 25 MG capsule, TAKE 1 OR 2 CAPSULES BY MOUTH EVERY NIGHT AT BEDTIME AS NEEDED, Disp: , Rfl: 2  •  promethazine (PHENERGAN) 25 MG tablet, Take 25 mg by mouth Every 8 (Eight) Hours As Needed., Disp: , Rfl: 2  •  ubrogepant 100 MG tablet, TAKE ONE TABLET BY MOUTH DAILY AS NEEDED AT ONSET OF MIGRAINE HEADACHE, Disp: , Rfl:   •  verapamil (VERELAN PM) 200 MG capsule sustained-release 24 hr capsule, TAKE ONE CAPSULE BY MOUTH DAILY BEFORE EATING, Disp: , Rfl: 5  •  amoxicillin-clavulanate (AUGMENTIN) 500-125 MG per tablet, TAKE ONE TABLET BY MOUTH TWO TIMES A DAY FOR 10 DAYS FINISH ALL OF THIS MEDICATION, Disp: , Rfl: 0  •  buPROPion XL (WELLBUTRIN XL) 150 MG 24 hr tablet, Take 1 tablet by mouth Every Morning., Disp: 30 tablet, Rfl: 0  •  Cholecalciferol (VITAMIN D3) 40078 units capsule, Take 50,000 Units by mouth 1 (One) Time Per Week., Disp: , Rfl: 5  •  FLUoxetine (PROzac) 20 MG capsule, Take 20 mg by mouth 3 (three) times a day., Disp: , Rfl:   •  gabapentin (NEURONTIN) 300 MG capsule, Take 300 mg by mouth 2 (two) times a day., Disp: , Rfl:   •  indomethacin (INDOCIN) 25 MG capsule, Take 1 capsule by mouth 2 (Two) Times a Day With Meals., Disp: 15 capsule, Rfl: 0  •  naproxen (NAPROSYN) 500 MG tablet, Take 1 tablet by mouth 2 (Two) Times a Day As Needed for Moderate Pain ., Disp: 20 tablet, Rfl: 0  •  nystatin (MYCOSTATIN) 308183 UNIT/GM  ointment, APPLY TO THE AFFECTED AREA FOUR TIMES A DAY AS NEEDED, Disp: , Rfl: 0  •  OnabotulinumtoxinA (BOTOX) 200 units reconstituted solution, Inject  into the appropriate muscle as directed by prescriber 1 (One) Time., Disp: , Rfl:   •  ondansetron ODT (ZOFRAN-ODT) 4 MG disintegrating tablet, Take 1 tablet by mouth Every 6 (Six) Hours As Needed for Nausea or Vomiting., Disp: 10 tablet, Rfl: 0  •  orphenadrine (NORFLEX) 100 MG 12 hr tablet, Take 1 tablet by mouth 2 (Two) Times a Day., Disp: 20 tablet, Rfl: 0  •  oxyCODONE-acetaminophen (PERCOCET) 5-325 MG per tablet, Take 1 tablet by mouth See Admin Instructions. Take 1 tablet by mouth every 4 to 6 hours as needed for pain, Disp: , Rfl: 0  •  valACYclovir (VALTREX) 1000 MG tablet, TAKE TWO TABLETS BY MOUTH TWO TIMES A DAY FOR 10 DAYS, Disp: , Rfl: 0  •  verapamil PM (VERELAN PM) 180 MG 24 hr capsule, Take 180 mg by mouth Daily., Disp: , Rfl: 2  •  vitamin B-12 (CYANOCOBALAMIN) 500 MCG tablet, Take 500 mcg by mouth Daily., Disp: , Rfl: 1      Allergies:  Allergies   Allergen Reactions   • Codeine Hives and Itching         Family History:  Family History   Problem Relation Age of Onset   • Cancer Father    • Hypertension Father    • Hyperlipidemia Mother          Surgical History:  Past Surgical History:   Procedure Laterality Date   • ABDOMINAL SURGERY     • BOTOX INJECTION N/A 08/05/2022    for mirgrains, every 3 months   • CARPAL TUNNEL RELEASE Bilateral    • CHOLECYSTECTOMY     • CRANIAL NEUROSTIMULATOR INSERTION/REPLACEMENT  01/09/2017    for migraines   • EYE SURGERY Bilateral     Cataract   • GASTRIC BANDING     • HAND SURGERY     • HYSTERECTOMY     • JOINT REPLACEMENT  2012   • JOINT REPLACEMENT Right 02/14/2017    right total knee  [facility]  dr farah   • KNEE SURGERY     • [place] ARTHRP KNE CONDYLE&PLATU MEDIAL&LAT COMPARTMENTS Right 02/14/2017    Procedure: TOTAL KNEE ARTHROPLASTY;  Surgeon: Wilfred Farah MD;  Location: [facility];  Service: Orthopedics  "        Radiology:   XR Shoulder 2+ View Left    Result Date: 2/6/2023    Mild degenerative change of the AC joint. Otherwise unremarkable exam.  This report was finalized on 2/6/2023 1:12 PM by Dr. Hermilo Reich MD.        Radiographs left shoulder reviewed show mild osteoarthritis with narrowing of the acromioclavicular joint and flaring of the lateral clavicle.      Examination:   GENERAL: 62 y.o. female, alert and oriented X 3 in no acute distress.   Visit Vitals  Ht 165.1 cm (65\")   Wt 109 kg (240 lb 4.8 oz)   BMI 39.99 kg/m²         Orthopedic Examination: Left shoulder reveals no localized tenderness to the acromioclavicular joint.  Mild increase in pain with crossarm adduction.  Mild signs of impingement with Neer's and Currie maneuvers with Jobes maneuver he demonstrates good strength with mild pain.  Sippel groove is nontender speeds test is negative.  Full internal and external rotation.  Neurovascular exam is grossly intact.      Assessment & Plan:   62 y.o. female presents with left shoulder injury sustained 6 weeks ago with presenting complaints of lateral left arm pain.  Ideally we would be able to obtain MRI however given her neurostimulator we will be able to obtain.  She is referred to physical therapy for left shoulder contusion and sprain.  She will follow-up in 8 weeks.         Diagnosis Plan   1. Contusion of left shoulder, initial encounter  Ambulatory Referral to Physical Therapy Evaluate and treat      2. Sprain of left shoulder, unspecified shoulder sprain type, initial encounter  Ambulatory Referral to Physical Therapy Evaluate and treat                    Cc:  Kreis, Samuel Duane, MD          "

## 2023-10-17 DIAGNOSIS — M25.562 LEFT KNEE PAIN, UNSPECIFIED CHRONICITY: Primary | ICD-10-CM

## 2023-11-06 ENCOUNTER — HOSPITAL ENCOUNTER (OUTPATIENT)
Dept: GENERAL RADIOLOGY | Facility: HOSPITAL | Age: 63
Discharge: HOME | End: 2023-11-06
Admitting: ORTHOPAEDIC SURGERY
Payer: MEDICARE

## 2023-11-06 ENCOUNTER — OFFICE VISIT (OUTPATIENT)
Dept: ORTHOPEDIC SURGERY | Facility: CLINIC | Age: 63
End: 2023-11-06
Payer: MEDICARE

## 2023-11-06 VITALS — HEIGHT: 65 IN | WEIGHT: 258.8 LBS | BODY MASS INDEX: 43.12 KG/M2

## 2023-11-06 DIAGNOSIS — M25.562 LEFT KNEE PAIN, UNSPECIFIED CHRONICITY: ICD-10-CM

## 2023-11-06 DIAGNOSIS — T84.84XA PAIN DUE TO TOTAL LEFT KNEE REPLACEMENT, INITIAL ENCOUNTER: Primary | ICD-10-CM

## 2023-11-06 DIAGNOSIS — Z96.652 PAIN DUE TO TOTAL LEFT KNEE REPLACEMENT, INITIAL ENCOUNTER: Primary | ICD-10-CM

## 2023-11-06 PROCEDURE — 73562 X-RAY EXAM OF KNEE 3: CPT

## 2023-11-06 PROCEDURE — 73562 X-RAY EXAM OF KNEE 3: CPT | Performed by: RADIOLOGY

## 2023-11-06 RX ORDER — ESCITALOPRAM OXALATE 20 MG/1
20 TABLET ORAL DAILY
COMMUNITY

## 2023-11-06 NOTE — PROGRESS NOTES
Established New Problem         Patient: Roseline Steiner  YOB: 1960  Date of Encounter: 11/06/2023        Chief  Complaint:   Chief Complaint   Patient presents with    Left Knee - Pain, Follow-up           HPI:  Roseline Steiner, 63 y.o. female presents for evaluation of left knee pain began several months ago..  She underwent left total knee arthroplasty February 14, 2017.  Has experienced generalized pain left knee with aching along the medial and lateral aspect and popping sensation which is uncomfortable.  Reports no trauma to her left knee she has undergone right total knee arthroplasty and continues to do well.  Her past medical history includes Royd disease migraine headaches        Medical History:  Patient Active Problem List   Diagnosis    Migraine with status migrainosus    Arthralgia of left hip    Greater trochanteric bursitis    Closed nondisplaced fracture of right patella    Status post total right knee replacement    Pes anserine bursitis    Pain due to total left knee replacement     Past Medical History:   Diagnosis Date    Arthritis     Disease of thyroid gland     Fibromyalgia     Fibromyalgia     GERD (gastroesophageal reflux disease)     Insomnia     Migraine     Migraines     Nephrolithiasis     PONV (postoperative nausea and vomiting)     Spinal headache            Social History:  Social History     Socioeconomic History    Marital status:    Tobacco Use    Smoking status: Never    Smokeless tobacco: Never   Vaping Use    Vaping Use: Never used   Substance and Sexual Activity    Alcohol use: No    Drug use: No    Sexual activity: Defer           Current Medications:    Current Outpatient Medications:     baclofen (LIORESAL) 10 MG tablet, TAKE ONE TABLET BY MOUTH EVERY MORNING AND TAKE TWO TABLETS EVERY EVENING, Disp: , Rfl: 2    butorphanol (STADOL) 10 MG/ML nasal spray, 1 spray into each nostril As Needed for mild pain (1-3) or moderate pain (4-6) (migraine, take if  migranol doesn't work). May repeat 1 spray in 60 min if first spray doesn't achieve good migraine control., Disp: , Rfl: 2    Emgality 120 MG/ML auto-injector pen, INJECT 2 ML SUBCUTANEOUSLY FOR THE FIRST MONTH, THEN INJECT 1 ML ONCE EVERY MONTH THEREAFTER, Disp: , Rfl:     escitalopram (LEXAPRO) 20 MG tablet, Take 1 tablet by mouth Daily., Disp: , Rfl:     levothyroxine (SYNTHROID, LEVOTHROID) 100 MCG tablet, Take 1 tablet by mouth Daily., Disp: , Rfl:     nortriptyline (PAMELOR) 25 MG capsule, TAKE 1 OR 2 CAPSULES BY MOUTH EVERY NIGHT AT BEDTIME AS NEEDED, Disp: , Rfl: 2    promethazine (PHENERGAN) 25 MG tablet, Take 1 tablet by mouth Every 8 (Eight) Hours As Needed., Disp: , Rfl: 2    ubrogepant 100 MG tablet, TAKE ONE TABLET BY MOUTH DAILY AS NEEDED AT ONSET OF MIGRAINE HEADACHE, Disp: , Rfl:     verapamil (VERELAN PM) 200 MG capsule sustained-release 24 hr capsule, TAKE ONE CAPSULE BY MOUTH DAILY BEFORE EATING, Disp: , Rfl: 5    amoxicillin-clavulanate (AUGMENTIN) 500-125 MG per tablet, TAKE ONE TABLET BY MOUTH TWO TIMES A DAY FOR 10 DAYS FINISH ALL OF THIS MEDICATION, Disp: , Rfl: 0    buPROPion XL (WELLBUTRIN XL) 150 MG 24 hr tablet, Take 1 tablet by mouth Every Morning., Disp: 30 tablet, Rfl: 0    Cholecalciferol (VITAMIN D3) 61881 units capsule, Take 50,000 Units by mouth 1 (One) Time Per Week., Disp: , Rfl: 5    FLUoxetine (PROzac) 20 MG capsule, Take 20 mg by mouth 3 (three) times a day., Disp: , Rfl:     gabapentin (NEURONTIN) 300 MG capsule, Take 300 mg by mouth 2 (two) times a day., Disp: , Rfl:     indomethacin (INDOCIN) 25 MG capsule, Take 1 capsule by mouth 2 (Two) Times a Day With Meals., Disp: 15 capsule, Rfl: 0    naproxen (NAPROSYN) 500 MG tablet, Take 1 tablet by mouth 2 (Two) Times a Day As Needed for Moderate Pain ., Disp: 20 tablet, Rfl: 0    nystatin (MYCOSTATIN) 831754 UNIT/GM ointment, APPLY TO THE AFFECTED AREA FOUR TIMES A DAY AS NEEDED, Disp: , Rfl: 0    OnabotulinumtoxinA (BOTOX) 200  units reconstituted solution, Inject  into the appropriate muscle as directed by prescriber 1 (One) Time., Disp: , Rfl:     ondansetron ODT (ZOFRAN-ODT) 4 MG disintegrating tablet, Take 1 tablet by mouth Every 6 (Six) Hours As Needed for Nausea or Vomiting., Disp: 10 tablet, Rfl: 0    orphenadrine (NORFLEX) 100 MG 12 hr tablet, Take 1 tablet by mouth 2 (Two) Times a Day., Disp: 20 tablet, Rfl: 0    oxyCODONE-acetaminophen (PERCOCET) 5-325 MG per tablet, Take 1 tablet by mouth See Admin Instructions. Take 1 tablet by mouth every 4 to 6 hours as needed for pain, Disp: , Rfl: 0    valACYclovir (VALTREX) 1000 MG tablet, TAKE TWO TABLETS BY MOUTH TWO TIMES A DAY FOR 10 DAYS, Disp: , Rfl: 0    verapamil PM (VERELAN PM) 180 MG 24 hr capsule, Take 180 mg by mouth Daily., Disp: , Rfl: 2    vitamin B-12 (CYANOCOBALAMIN) 500 MCG tablet, Take 500 mcg by mouth Daily., Disp: , Rfl: 1        Allergies:  Allergies   Allergen Reactions    Codeine Hives and Itching           Family History:  Family History   Problem Relation Age of Onset    Cancer Father     Hypertension Father     Hyperlipidemia Mother          Surgical History:  Past Surgical History:   Procedure Laterality Date    ABDOMINAL SURGERY      BOTOX INJECTION N/A 08/05/2022    for mirgrains, every 3 months    CARPAL TUNNEL RELEASE Bilateral     CHOLECYSTECTOMY      CRANIAL NEUROSTIMULATOR INSERTION/REPLACEMENT  01/09/2017    for migraines    EYE SURGERY Bilateral     Cataract    GASTRIC BANDING      HAND SURGERY      HYSTERECTOMY      JOINT REPLACEMENT  2012    JOINT REPLACEMENT Right 02/14/2017    right total knee  [facility]  dr farah    KNEE SURGERY      [place] ARTHRP KNE CONDYLE&PLATU MEDIAL&LAT COMPARTMENTS Right 02/14/2017    Procedure: TOTAL KNEE ARTHROPLASTY;  Surgeon: Wilfred Farah MD;  Location: [facility];  Service: Orthopedics           Radiology:   XR Knee 3 View Left    Result Date: 11/6/2023  1.  Total left knee arthroplasty. 2.  Joint effusion. 3.  No  "acute osseous findings.   This report was finalized on 11/6/2023 10:48 AM by Dr. Hermilo Reich MD.         Radiographs left knee obtained today show suspicion of loosening involving the medial tibia and possibly the femoral component.          Examination:   GENERAL: 63 y.o. female, alert and oriented X 3 in no acute distress.   Visit Vitals  Ht 165.1 cm (65\")   Wt 117 kg (258 lb 12.8 oz)   BMI 43.07 kg/m²           Orthopedic Examination: Knee demonstrates minimal effusion mild stability with varus valgus stressing.  She has full extension with flexion to 90 degrees demonstrates no gross instability generalized tenderness normal neurovascular status.          Assessment & Plan:   63 y.o. female presents years following left total knee arthroplasty with new onset of generalized left knee pain with radiographs suspicious for movement of the tibial component and possibly the femoral component.  We will request a bone scan to evaluate for loosening of left total knee arthroplasty we will see her back once completed.         Diagnosis Plan   1. Pain due to total left knee replacement, initial encounter                  Cc:  Kreis, Samuel Duane, MD          "

## 2023-12-04 ENCOUNTER — HOSPITAL ENCOUNTER (OUTPATIENT)
Dept: NUCLEAR MEDICINE | Facility: HOSPITAL | Age: 63
Discharge: HOME | End: 2023-12-04
Payer: MEDICARE

## 2023-12-04 DIAGNOSIS — Z96.652 PAIN DUE TO TOTAL LEFT KNEE REPLACEMENT, INITIAL ENCOUNTER: ICD-10-CM

## 2023-12-04 DIAGNOSIS — T84.84XA PAIN DUE TO TOTAL LEFT KNEE REPLACEMENT, INITIAL ENCOUNTER: ICD-10-CM

## 2023-12-04 PROCEDURE — 0 TECHNETIUM OXIDRONATE KIT: Performed by: ORTHOPAEDIC SURGERY

## 2023-12-04 PROCEDURE — A9561 TC99M OXIDRONATE: HCPCS | Performed by: ORTHOPAEDIC SURGERY

## 2023-12-04 PROCEDURE — 78300 BONE IMAGING LIMITED AREA: CPT

## 2023-12-04 RX ADMIN — TECHNETIUM TC 99M OXIDRONATE 1 DOSE: 3.15 INJECTION, POWDER, LYOPHILIZED, FOR SOLUTION INTRAVENOUS at 10:40

## 2023-12-06 ENCOUNTER — OFFICE VISIT (OUTPATIENT)
Dept: ORTHOPEDIC SURGERY | Facility: CLINIC | Age: 63
End: 2023-12-06
Payer: MEDICARE

## 2023-12-06 VITALS — HEIGHT: 65 IN | WEIGHT: 250 LBS | BODY MASS INDEX: 41.65 KG/M2

## 2023-12-06 DIAGNOSIS — T84.84XA PAIN DUE TO TOTAL LEFT KNEE REPLACEMENT, INITIAL ENCOUNTER: Primary | ICD-10-CM

## 2023-12-06 DIAGNOSIS — Z96.652 PAIN DUE TO TOTAL LEFT KNEE REPLACEMENT, INITIAL ENCOUNTER: Primary | ICD-10-CM

## 2023-12-06 RX ORDER — TRAZODONE HYDROCHLORIDE 100 MG/1
TABLET ORAL
COMMUNITY

## 2023-12-06 NOTE — PROGRESS NOTES
Follow-up Visit         Patient: Roseline Steiner  YOB: 1960  Date of Encounter: 12/06/2023      Chief  Complaint:   Chief Complaint   Patient presents with    Left Knee - Follow-up, Pain, Edema         HPI:  Roseline Steiner, 63 y.o. female presents in follow-up left knee pain of several months duration with no trauma she underwent left total knee arthroplasty February 14, 2017.  She has done reasonably well until recent onset of symptoms and especially with activity.  Past medical history includes thyroid disease.  History is negative for tobacco.        Medical History:  Patient Active Problem List   Diagnosis    Migraine with status migrainosus    Arthralgia of left hip    Greater trochanteric bursitis    Closed nondisplaced fracture of right patella    Status post total right knee replacement    Pes anserine bursitis    Pain due to total left knee replacement     Past Medical History:   Diagnosis Date    Arthritis     Disease of thyroid gland     Fibromyalgia     Fibromyalgia     GERD (gastroesophageal reflux disease)     Insomnia     Migraine     Migraines     Nephrolithiasis     PONV (postoperative nausea and vomiting)     Spinal headache            Social History:  Social History     Socioeconomic History    Marital status:    Tobacco Use    Smoking status: Never    Smokeless tobacco: Never   Vaping Use    Vaping Use: Never used   Substance and Sexual Activity    Alcohol use: No    Drug use: No    Sexual activity: Defer           Current Medications:    Current Outpatient Medications:     baclofen (LIORESAL) 10 MG tablet, TAKE ONE TABLET BY MOUTH EVERY MORNING AND TAKE TWO TABLETS EVERY EVENING, Disp: , Rfl: 2    butorphanol (STADOL) 10 MG/ML nasal spray, 1 spray into each nostril As Needed for mild pain (1-3) or moderate pain (4-6) (migraine, take if migranol doesn't work). May repeat 1 spray in 60 min if first spray doesn't achieve good migraine control., Disp: , Rfl: 2    Emgality 120  MG/ML auto-injector pen, INJECT 2 ML SUBCUTANEOUSLY FOR THE FIRST MONTH, THEN INJECT 1 ML ONCE EVERY MONTH THEREAFTER, Disp: , Rfl:     escitalopram (LEXAPRO) 20 MG tablet, Take 1 tablet by mouth Daily., Disp: , Rfl:     gabapentin (NEURONTIN) 300 MG capsule, Take 1 capsule by mouth 2 (Two) Times a Day., Disp: , Rfl:     levothyroxine (SYNTHROID, LEVOTHROID) 100 MCG tablet, Take 1 tablet by mouth Daily., Disp: , Rfl:     nortriptyline (PAMELOR) 25 MG capsule, TAKE 1 OR 2 CAPSULES BY MOUTH EVERY NIGHT AT BEDTIME AS NEEDED, Disp: , Rfl: 2    promethazine (PHENERGAN) 25 MG tablet, Take 1 tablet by mouth Every 8 (Eight) Hours As Needed., Disp: , Rfl: 2    traZODone (DESYREL) 100 MG tablet, TAKE TWO TABLETS BY MOUTH EVERY DAY AFTER MEALS, Disp: , Rfl:     ubrogepant 100 MG tablet, TAKE ONE TABLET BY MOUTH DAILY AS NEEDED AT ONSET OF MIGRAINE HEADACHE, Disp: , Rfl:     verapamil (VERELAN PM) 200 MG capsule sustained-release 24 hr capsule, TAKE ONE CAPSULE BY MOUTH DAILY BEFORE EATING, Disp: , Rfl: 5        Allergies:  Allergies   Allergen Reactions    Codeine Hives and Itching           Family History:  Family History   Problem Relation Age of Onset    Cancer Father     Hypertension Father     Hyperlipidemia Mother            Surgical History:  Past Surgical History:   Procedure Laterality Date    ABDOMINAL SURGERY      BOTOX INJECTION N/A 08/05/2022    for mirgrains, every 3 months    CARPAL TUNNEL RELEASE Bilateral     CHOLECYSTECTOMY      CRANIAL NEUROSTIMULATOR INSERTION/REPLACEMENT  01/09/2017    for migraines    EYE SURGERY Bilateral     Cataract    GASTRIC BANDING      HAND SURGERY      HYSTERECTOMY      JOINT REPLACEMENT  2012    JOINT REPLACEMENT Right 02/14/2017    right total knee  [facility]  dr farah    KNEE SURGERY      [place] ARTHRP KNE CONDYLE&PLATU MEDIAL&LAT COMPARTMENTS Right 02/14/2017    Procedure: TOTAL KNEE ARTHROPLASTY;  Surgeon: Wilfred Farah MD;  Location: [facility];  Service: Orthopedics            Radiology:   NM Bone Scan Limited    Result Date: 12/4/2023  Asymmetric increased activity diffusely around the left knee prosthesis both the femoral and tibial components.   This report was finalized on 12/4/2023 4:18 PM by Dr. Justen Ortiz MD.           Radiographs left knee demonstrate suspicion for loosening of tibial and femoral components.  Bone scan obtained  reveals increased uptake in the left distal femur proximal tibia        Orthopedic Examination: Left knee reveals no significant effusion.  She demonstrates full flexion extension with no gross instability and mild discomfort.          Assessment & Plan:   63 y.o. female presents left knee pain following total knee arthroplasty with bone scan confirming suspected aseptic loosening.  Left knee was aspirated today with minimal aspirate obtained.  She is referred to Dr. Heck for consideration of revision left total knee arthroplasty.           Diagnosis Plan   1. Pain due to total left knee replacement, initial encounter              Large Joint Arthrocentesis: L knee  Date/Time: 12/11/2023 10:22 AM  Consent given by: patient  Site marked: site marked  Timeout: Immediately prior to procedure a time out was called to verify the correct patient, procedure, equipment, support staff and site/side marked as required   Supporting Documentation  Indications: pain   Procedure Details  Location: knee - L knee  Preparation: Patient was prepped and draped in the usual sterile fashion  Needle size: 18 G  Approach: anterolateral  Medications administered: 5 mL lidocaine PF 1% 1 %  Aspirate amount: 2 mL  Aspirate: serous  Patient tolerance: patient tolerated the procedure well with no immediate complications            Cc:  Kreis, Samuel Duane, MD              This document has been electronically signed by Wilfred Randle MD   December 11, 2023 10:20 EST

## 2023-12-11 RX ORDER — LIDOCAINE HYDROCHLORIDE 10 MG/ML
5 INJECTION, SOLUTION EPIDURAL; INFILTRATION; INTRACAUDAL; PERINEURAL
Status: COMPLETED | OUTPATIENT
Start: 2023-12-11 | End: 2023-12-11

## 2023-12-11 RX ADMIN — LIDOCAINE HYDROCHLORIDE 5 ML: 10 INJECTION, SOLUTION EPIDURAL; INFILTRATION; INTRACAUDAL; PERINEURAL at 10:22

## 2023-12-12 ENCOUNTER — TELEPHONE (OUTPATIENT)
Dept: ORTHOPEDIC SURGERY | Facility: CLINIC | Age: 63
End: 2023-12-12

## 2023-12-12 NOTE — TELEPHONE ENCOUNTER
Caller:  VIOLETA GUERRERO     Relationship to Patient: the patient    Phone Number: [phone] (home)     Reason for Call: PATIENT CALLED IN FOR [facility] CALL.

## 2023-12-14 NOTE — TELEPHONE ENCOUNTER
Caller: VIOLETA GUERRERO    Relationship to Patient: the patient    Phone Number: [phone] (home)     Reason for Call:   PATIENT CALLED IN RETURNING [facility] CALL, NO NOTES. PATIENT STATED DR. GARCÍA  OFFICE HAS NOT CONTACTED HER.

## (undated) DEVICE — HOLDER: Brand: DEROYAL

## (undated) DEVICE — WRP COMPR KN COLD UNIV

## (undated) DEVICE — PK KN TOTL ADDON 70

## (undated) DEVICE — SUT SILK 2/0 FS 18IN 685H

## (undated) DEVICE — SIGMA LCS HIGH PERFORMANCE INSTRUMENTS STERILE QUICK DRILL PINS: Brand: SIGMA LCS HIGH PERFORMANCE

## (undated) DEVICE — DISPOSABLE TOURNIQUET CUFF SINGLE BLADDER, SINGLE PORT AND LUER LOCK CONNECTOR: Brand: COLOR CUFF

## (undated) DEVICE — SIGMA LCS HIGH PERFORMANCE STERILE THREADED HEADED PINS: Brand: SIGMA LCS HIGH PERFORMANCE

## (undated) DEVICE — NDL HYPO ECLPS SFTY 18G 1 1/2IN

## (undated) DEVICE — PK KN TOTL 70

## (undated) DEVICE — LARGE RECIP BLADE, OFFSET, 12.7MM X 68MM X 1.15MM: Brand: MICROAIRE®

## (undated) DEVICE — BOWL AND CEMENT CARTRIDGE WITH BREAKAWAY FEMORAL NOZZLE AND MEDIUM PRESSURIZER: Brand: ACM

## (undated) DEVICE — DRSNG WND STRIP OPTIFOAM AG A/MIC LF 3.5X14IN  STRL

## (undated) DEVICE — HANDPIECE SET WITH COAXIAL HIGH FLOW TIP AND SUCTION TUBE: Brand: INTERPULSE

## (undated) DEVICE — STRYKER PERFORMANCE SERIES SAGITTAL BLADE: Brand: STRYKER PERFORMANCE SERIES

## (undated) DEVICE — KT DRN EVAC WND PVC 15F3/16IN 400CC

## (undated) DEVICE — SUT ETHIB NO 2 X519H

## (undated) DEVICE — ENCORE® LATEX MICRO SIZE 7.5, STERILE LATEX POWDER-FREE SURGICAL GLOVE: Brand: ENCORE

## (undated) DEVICE — 4-PORT MANIFOLD: Brand: NEPTUNE 2

## (undated) DEVICE — IMMOB KN 3PNL DLX CANVS 19IN BLU